# Patient Record
Sex: MALE | Race: WHITE | NOT HISPANIC OR LATINO | Employment: OTHER | URBAN - METROPOLITAN AREA
[De-identification: names, ages, dates, MRNs, and addresses within clinical notes are randomized per-mention and may not be internally consistent; named-entity substitution may affect disease eponyms.]

---

## 2017-03-07 RX ORDER — PANTOPRAZOLE SODIUM 40 MG/1
TABLET, DELAYED RELEASE ORAL
Qty: 90 TABLET | Refills: 0 | Status: SHIPPED | OUTPATIENT
Start: 2017-03-07 | End: 2017-06-08 | Stop reason: SDUPTHER

## 2017-04-24 RX ORDER — AMLODIPINE BESYLATE 5 MG/1
TABLET ORAL
Qty: 90 TABLET | Refills: 0 | Status: SHIPPED | OUTPATIENT
Start: 2017-04-24 | End: 2017-04-28 | Stop reason: SDUPTHER

## 2017-04-28 ENCOUNTER — OFFICE VISIT (OUTPATIENT)
Dept: INTERNAL MEDICINE | Facility: CLINIC | Age: 67
End: 2017-04-28

## 2017-04-28 VITALS
WEIGHT: 166.6 LBS | DIASTOLIC BLOOD PRESSURE: 78 MMHG | BODY MASS INDEX: 26.78 KG/M2 | SYSTOLIC BLOOD PRESSURE: 128 MMHG | HEIGHT: 66 IN | HEART RATE: 60 BPM

## 2017-04-28 DIAGNOSIS — I10 BENIGN ESSENTIAL HYPERTENSION: ICD-10-CM

## 2017-04-28 DIAGNOSIS — E22.2 SYNDROME OF INAPPROPRIATE SECRETION OF ANTIDIURETIC HORMONE (HCC): ICD-10-CM

## 2017-04-28 DIAGNOSIS — E03.9 ACQUIRED HYPOTHYROIDISM: ICD-10-CM

## 2017-04-28 DIAGNOSIS — I87.2 CHRONIC VENOUS INSUFFICIENCY: Primary | ICD-10-CM

## 2017-04-28 PROCEDURE — 99214 OFFICE O/P EST MOD 30 MIN: CPT | Performed by: INTERNAL MEDICINE

## 2017-04-28 NOTE — PROGRESS NOTES
Subjective   Yeyo Abebe is a 66 y.o. male.     History of Present Illness he is here today for his annual visit which includes follow-up of hypertension, and venous insufficiency, and syndrome of inappropriate ADH.  Generally he continues to feel well.  He does note some swelling in his ankles on an intermittent basis which is most prominent at the end of the day and in apparent in the morning when he first gets up.  There is no associated pain.  He denies any PND or dyspnea on exertion.  He has not had any chest pain.  He denies any dizziness or focal neurologic symptoms.  His energy level has been good.  His review systems other than chronic and stable 1-2 per night nocturia is negative.  Eyes any nausea or vomiting or abdominal pain.        Review of Systems   Constitutional: Negative for activity change, appetite change and fatigue.   HENT: Negative for trouble swallowing.    Respiratory: Negative for cough, chest tightness, shortness of breath and wheezing.    Cardiovascular: Positive for leg swelling. Negative for chest pain and palpitations.   Gastrointestinal: Negative for abdominal pain, anal bleeding, blood in stool, constipation, diarrhea, nausea and vomiting.   Genitourinary: Negative for difficulty urinating, dysuria, flank pain, frequency and hematuria.   Musculoskeletal: Negative for arthralgias and back pain.   Neurological: Negative for dizziness, syncope, facial asymmetry, speech difficulty, weakness, numbness and headaches.   Psychiatric/Behavioral: Negative for confusion and dysphoric mood. The patient is not nervous/anxious.        Objective   Physical Exam   Constitutional: He is oriented to person, place, and time. Vital signs are normal. He appears well-developed and well-nourished. He is active. He does not appear ill.   Eyes: Conjunctivae are normal.   Fundoscopic exam:       The right eye shows no AV nicking, no exudate and no hemorrhage.        The left eye shows no AV nicking, no  exudate and no hemorrhage.   Neck: Carotid bruit is not present. No thyroid mass and no thyromegaly present.   Cardiovascular: Normal rate, regular rhythm, S1 normal and S2 normal.  Exam reveals no S3 and no S4.    No murmur heard.  Pulses:       Dorsalis pedis pulses are 2+ on the right side, and 2+ on the left side.   Pulmonary/Chest: No tachypnea. No respiratory distress. He has no decreased breath sounds. He has no wheezes. He has no rhonchi. He has no rales.   Abdominal: Soft. Normal appearance and bowel sounds are normal. He exhibits no abdominal bruit and no mass. There is no hepatosplenomegaly. There is no tenderness.       Vascular Status -  His exam exhibits no right foot edema. His exam exhibits left foot edema (1+ malleolar edema).  Neurological: He is alert and oriented to person, place, and time. Gait normal.   Reflex Scores:       Bicep reflexes are 2+ on the right side.  Psychiatric: He has a normal mood and affect. His speech is normal and behavior is normal. Judgment and thought content normal. Cognition and memory are normal.       Assessment/Plan assessment #1 hypertension-good control and no sign of target organ injury #2 chronic venous insufficiency-mild and certainly Norvasc may be contributing to his edema.  This was discussed with him.  #3 syndrome of inappropriate ADH-diagnosis made 3 years ago and in no way has it adversely affected him.  He does not restrict free water intake.    Plan #1 no change medication #2 CBC, CMP, urinalysis, TSH.  Routine follow-up with me in 6 months.

## 2017-05-12 ENCOUNTER — LAB (OUTPATIENT)
Dept: INTERNAL MEDICINE | Facility: CLINIC | Age: 67
End: 2017-05-12

## 2017-05-12 DIAGNOSIS — I10 BENIGN ESSENTIAL HYPERTENSION: ICD-10-CM

## 2017-05-12 DIAGNOSIS — E03.9 ACQUIRED HYPOTHYROIDISM: ICD-10-CM

## 2017-05-12 LAB
ALBUMIN SERPL-MCNC: 3.97 G/DL (ref 3.4–4.6)
ALBUMIN/GLOB SERPL: 1 G/DL
ALP SERPL-CCNC: 65 U/L (ref 46–116)
ALT SERPL W P-5'-P-CCNC: 26 U/L (ref 16–63)
ANION GAP SERPL CALCULATED.3IONS-SCNC: 6 MMOL/L
AST SERPL-CCNC: 20 U/L (ref 7–37)
BACTERIA UR QL AUTO: ABNORMAL /HPF
BASOPHILS # BLD AUTO: 0.01 10*3/MM3 (ref 0–0.2)
BASOPHILS NFR BLD AUTO: 0.1 % (ref 0–1.5)
BILIRUB SERPL-MCNC: 0.5 MG/DL (ref 0.2–1)
BILIRUB UR QL STRIP: NEGATIVE
BUN BLD-MCNC: 6 MG/DL (ref 6–22)
BUN/CREAT SERPL: 10.7 (ref 7–25)
CALCIUM SPEC-SCNC: 8.8 MG/DL (ref 8.6–10.5)
CHLORIDE SERPL-SCNC: 90 MMOL/L (ref 95–107)
CLARITY UR: CLEAR
CO2 SERPL-SCNC: 31 MMOL/L (ref 23–32)
COLOR UR: YELLOW
CREAT BLD-MCNC: 0.56 MG/DL (ref 0.7–1.3)
EOSINOPHIL # BLD AUTO: 0.23 10*3/MM3 (ref 0–0.7)
EOSINOPHIL # BLD AUTO: 3 % (ref 0.3–6.2)
ERYTHROCYTE [DISTWIDTH] IN BLOOD BY AUTOMATED COUNT: 12.7 % (ref 11.5–14.5)
GFR SERPL CREATININE-BSD FRML MDRD: 146 ML/MIN/1.73
GLOBULIN UR ELPH-MCNC: 3.8 GM/DL
GLUCOSE BLD-MCNC: 84 MG/DL (ref 70–100)
GLUCOSE UR STRIP-MCNC: NEGATIVE MG/DL
HCT VFR BLD AUTO: 44.3 % (ref 40.4–52.2)
HGB BLD-MCNC: 14.8 G/DL (ref 13.7–17.6)
HGB UR QL STRIP.AUTO: ABNORMAL
HYALINE CASTS UR QL AUTO: ABNORMAL /LPF
IMM GRANULOCYTES # BLD: 0.04 10*3/MM3 (ref 0–0.03)
IMM GRANULOCYTES NFR BLD: 0.5 % (ref 0–0.5)
KETONES UR QL STRIP: ABNORMAL
LEUKOCYTE ESTERASE UR QL STRIP.AUTO: NEGATIVE
LYMPHOCYTES # BLD AUTO: 1.63 10*3/MM3 (ref 0.9–4.8)
LYMPHOCYTES NFR BLD AUTO: 21.4 % (ref 19.6–45.3)
MCH RBC QN AUTO: 31.8 PG (ref 27–32.7)
MCHC RBC AUTO-ENTMCNC: 33.4 G/DL (ref 32.6–36.4)
MCV RBC AUTO: 95.1 FL (ref 79.8–96.2)
MONOCYTES # BLD AUTO: 0.83 10*3/MM3 (ref 0.2–1.2)
MONOCYTES NFR BLD AUTO: 10.9 % (ref 5–12)
MUCOUS THREADS URNS QL MICRO: ABNORMAL /HPF
NEUTROPHILS # BLD AUTO: 4.87 10*3/MM3 (ref 1.9–8.1)
NEUTROPHILS NFR BLD AUTO: 64.1 % (ref 42.7–76)
NITRITE UR QL STRIP: NEGATIVE
PH UR STRIP.AUTO: 6.5 [PH] (ref 5–8)
PLATELET # BLD AUTO: 272 10*3/MM3 (ref 140–500)
POTASSIUM BLD-SCNC: 4 MMOL/L (ref 3.3–5.3)
PROT SERPL-MCNC: 7.8 G/DL (ref 6.3–8.4)
PROT UR QL STRIP: NEGATIVE
RBC # BLD AUTO: 4.66 10*6/MM3 (ref 4.6–6)
RBC # UR: ABNORMAL /HPF
REF LAB TEST METHOD: ABNORMAL
SODIUM BLD-SCNC: 127 MMOL/L (ref 136–145)
SP GR UR STRIP: 1.02 (ref 1–1.03)
SQUAMOUS #/AREA URNS HPF: ABNORMAL /HPF
TSH SERPL DL<=0.05 MIU/L-ACNC: 2.7 MIU/ML (ref 0.4–4.2)
UROBILINOGEN UR QL STRIP: ABNORMAL
WBC # BLD AUTO: 7.61 10*3/MM3 (ref 4.5–10.7)
WBC UR QL AUTO: ABNORMAL /HPF

## 2017-05-12 PROCEDURE — 84443 ASSAY THYROID STIM HORMONE: CPT | Performed by: INTERNAL MEDICINE

## 2017-05-12 PROCEDURE — 81001 URINALYSIS AUTO W/SCOPE: CPT | Performed by: INTERNAL MEDICINE

## 2017-05-12 PROCEDURE — 80053 COMPREHEN METABOLIC PANEL: CPT | Performed by: INTERNAL MEDICINE

## 2017-05-16 ENCOUNTER — TELEPHONE (OUTPATIENT)
Dept: INTERNAL MEDICINE | Facility: CLINIC | Age: 67
End: 2017-05-16

## 2017-06-08 RX ORDER — PANTOPRAZOLE SODIUM 40 MG/1
TABLET, DELAYED RELEASE ORAL
Qty: 90 TABLET | Refills: 0 | Status: SHIPPED | OUTPATIENT
Start: 2017-06-08 | End: 2017-09-05 | Stop reason: SDUPTHER

## 2017-07-25 RX ORDER — AMLODIPINE BESYLATE 5 MG/1
TABLET ORAL
Qty: 90 TABLET | Refills: 0 | Status: SHIPPED | OUTPATIENT
Start: 2017-07-25 | End: 2017-10-27

## 2017-09-07 RX ORDER — PANTOPRAZOLE SODIUM 40 MG/1
TABLET, DELAYED RELEASE ORAL
Qty: 90 TABLET | Refills: 0 | Status: SHIPPED | OUTPATIENT
Start: 2017-09-07 | End: 2017-12-04 | Stop reason: SDUPTHER

## 2017-10-23 RX ORDER — AMLODIPINE BESYLATE 5 MG/1
TABLET ORAL
Qty: 90 TABLET | Refills: 3 | Status: SHIPPED | OUTPATIENT
Start: 2017-10-23 | End: 2017-10-27

## 2017-10-27 ENCOUNTER — OFFICE VISIT (OUTPATIENT)
Dept: INTERNAL MEDICINE | Facility: CLINIC | Age: 67
End: 2017-10-27

## 2017-10-27 VITALS
SYSTOLIC BLOOD PRESSURE: 120 MMHG | HEIGHT: 66 IN | WEIGHT: 166 LBS | DIASTOLIC BLOOD PRESSURE: 80 MMHG | BODY MASS INDEX: 26.68 KG/M2 | HEART RATE: 66 BPM

## 2017-10-27 DIAGNOSIS — E22.2 SYNDROME OF INAPPROPRIATE SECRETION OF ANTIDIURETIC HORMONE (HCC): Primary | ICD-10-CM

## 2017-10-27 DIAGNOSIS — I10 BENIGN ESSENTIAL HYPERTENSION: ICD-10-CM

## 2017-10-27 PROCEDURE — 99214 OFFICE O/P EST MOD 30 MIN: CPT | Performed by: INTERNAL MEDICINE

## 2017-10-27 NOTE — PROGRESS NOTES
Subjective   Yeyo Abebe is a 67 y.o. male.     History of Present Illness he is here today for his annual visit which includes follow-up of hypertension, syndrome of inappropriate ADH, and GERD.  Overall he continues to do well.  His appetite is good and his weight is stable.  He denies any dizziness, syncope, headache, or focal neurologic episodes.  He denies any PND, dyspnea on exertion, chest pain, swelling in the ankles, abdominal pain, change in bowel habit, nausea and vomiting, melanotic stool, or rectal bleeding.  His only real complaint is that of some right upper lid ptosis that he has noticed over the last 3 or 4 months.  It may last several hours.  There is no associated blurred vision or diplopia and he denies any discharge from the eye or eye pain.        Review of Systems   Constitutional: Negative for activity change, appetite change and fatigue.   HENT: Negative for trouble swallowing.    Respiratory: Negative for cough, chest tightness, shortness of breath and wheezing.    Cardiovascular: Negative for chest pain, palpitations and leg swelling.   Gastrointestinal: Negative for abdominal pain, anal bleeding, blood in stool, constipation, diarrhea, nausea and vomiting.   Genitourinary: Positive for frequency. Negative for difficulty urinating, dysuria, flank pain and hematuria.   Musculoskeletal: Negative for arthralgias and back pain.   Neurological: Negative for dizziness, syncope, facial asymmetry, speech difficulty, weakness, numbness and headaches.   Psychiatric/Behavioral: Negative for confusion and dysphoric mood. The patient is not nervous/anxious.        Objective   Physical Exam   Constitutional: He is oriented to person, place, and time. Vital signs are normal. He appears well-developed and well-nourished. He is active. He does not appear ill.   Eyes: Conjunctivae, EOM and lids are normal.   Fundoscopic exam:       The right eye shows no AV nicking, no exudate and no hemorrhage.        The  left eye shows no AV nicking, no exudate and no hemorrhage.   Neck: Carotid bruit is not present. No thyroid mass and no thyromegaly present.   Cardiovascular: Normal rate, regular rhythm, S1 normal and S2 normal.  Exam reveals no S3 and no S4.    No murmur heard.  Pulses:       Posterior tibial pulses are 2+ on the right side, and 2+ on the left side.   Pulmonary/Chest: No tachypnea. No respiratory distress. He has no decreased breath sounds. He has no wheezes. He has no rhonchi. He has no rales.   Abdominal: Soft. Normal appearance and bowel sounds are normal. He exhibits no abdominal bruit and no mass. There is no hepatosplenomegaly. There is no tenderness.       Vascular Status -  His exam exhibits no right foot edema. His exam exhibits no left foot edema.  Neurological: He is alert and oriented to person, place, and time. He has normal strength. No cranial nerve deficit. He displays a negative Romberg sign. Gait normal.   Reflex Scores:       Bicep reflexes are 2+ on the right side and 2+ on the left side.  Psychiatric: He has a normal mood and affect. His speech is normal and behavior is normal. Judgment and thought content normal. Cognition and memory are normal.       Assessment/Plan May lab showed a normal CBC, TSH, urinalysis.  CMP shows sodium 127 and chloride 90    Assessment #1 hypertension-good control #2 GERD-good control #3 syndrome of inappropriate ADH-he tolerates relatively low-sodium well and he has done so for 3 years now.  Certainly no evidence of underlying pulmonary or CNS process.    Plan #1 no change medication #2 BMP today #3 ophthalmology consult for episodic ptosis.  He has already received flu vaccination for this season.  Routine follow-up with me in 6 months.

## 2017-10-28 ENCOUNTER — LAB (OUTPATIENT)
Dept: LAB | Facility: HOSPITAL | Age: 67
End: 2017-10-28
Attending: INTERNAL MEDICINE

## 2017-10-28 DIAGNOSIS — E22.2 SYNDROME OF INAPPROPRIATE SECRETION OF ANTIDIURETIC HORMONE (HCC): ICD-10-CM

## 2017-10-28 LAB
ANION GAP SERPL CALCULATED.3IONS-SCNC: 11.2 MMOL/L
BUN BLD-MCNC: 6 MG/DL (ref 8–23)
BUN/CREAT SERPL: 7.4 (ref 7–25)
CALCIUM SPEC-SCNC: 9 MG/DL (ref 8.6–10.5)
CHLORIDE SERPL-SCNC: 90 MMOL/L (ref 98–107)
CO2 SERPL-SCNC: 27.8 MMOL/L (ref 22–29)
CREAT BLD-MCNC: 0.81 MG/DL (ref 0.76–1.27)
GFR SERPL CREATININE-BSD FRML MDRD: 95 ML/MIN/1.73
GLUCOSE BLD-MCNC: 105 MG/DL (ref 65–99)
POTASSIUM BLD-SCNC: 4.2 MMOL/L (ref 3.5–5.2)
SODIUM BLD-SCNC: 129 MMOL/L (ref 136–145)

## 2017-10-28 PROCEDURE — 80048 BASIC METABOLIC PNL TOTAL CA: CPT

## 2017-10-28 PROCEDURE — 36415 COLL VENOUS BLD VENIPUNCTURE: CPT

## 2017-10-30 ENCOUNTER — TELEPHONE (OUTPATIENT)
Dept: INTERNAL MEDICINE | Facility: CLINIC | Age: 67
End: 2017-10-30

## 2017-10-30 RX ORDER — METOPROLOL TARTRATE 50 MG/1
TABLET, FILM COATED ORAL
Qty: 270 TABLET | Refills: 3 | Status: SHIPPED | OUTPATIENT
Start: 2017-10-30 | End: 2018-10-08 | Stop reason: SDUPTHER

## 2017-12-04 RX ORDER — PANTOPRAZOLE SODIUM 40 MG/1
TABLET, DELAYED RELEASE ORAL
Qty: 90 TABLET | Refills: 0 | Status: SHIPPED | OUTPATIENT
Start: 2017-12-04 | End: 2018-03-04 | Stop reason: SDUPTHER

## 2018-03-06 RX ORDER — PANTOPRAZOLE SODIUM 40 MG/1
TABLET, DELAYED RELEASE ORAL
Qty: 90 TABLET | Refills: 0 | Status: SHIPPED | OUTPATIENT
Start: 2018-03-06 | End: 2018-05-30 | Stop reason: SDUPTHER

## 2018-05-11 ENCOUNTER — OFFICE VISIT (OUTPATIENT)
Dept: INTERNAL MEDICINE | Facility: CLINIC | Age: 68
End: 2018-05-11

## 2018-05-11 VITALS
BODY MASS INDEX: 26.36 KG/M2 | HEIGHT: 66 IN | DIASTOLIC BLOOD PRESSURE: 68 MMHG | WEIGHT: 164 LBS | OXYGEN SATURATION: 97 % | SYSTOLIC BLOOD PRESSURE: 122 MMHG | HEART RATE: 62 BPM

## 2018-05-11 DIAGNOSIS — N40.1 BENIGN PROSTATIC HYPERPLASIA WITH NOCTURIA: ICD-10-CM

## 2018-05-11 DIAGNOSIS — E87.1 CHRONIC HYPONATREMIA: ICD-10-CM

## 2018-05-11 DIAGNOSIS — E22.2 SYNDROME OF INAPPROPRIATE SECRETION OF ANTIDIURETIC HORMONE (HCC): ICD-10-CM

## 2018-05-11 DIAGNOSIS — R35.1 BENIGN PROSTATIC HYPERPLASIA WITH NOCTURIA: ICD-10-CM

## 2018-05-11 DIAGNOSIS — I10 BENIGN ESSENTIAL HYPERTENSION: Primary | ICD-10-CM

## 2018-05-11 LAB
BASOPHILS # BLD AUTO: 0.01 10*3/MM3 (ref 0–0.2)
BASOPHILS NFR BLD AUTO: 0.1 % (ref 0–1.5)
BILIRUB UR QL STRIP: NEGATIVE
CLARITY UR: CLEAR
COLOR UR: YELLOW
EOSINOPHIL # BLD AUTO: 0.22 10*3/MM3 (ref 0–0.7)
EOSINOPHIL # BLD AUTO: 3.2 % (ref 0.3–6.2)
ERYTHROCYTE [DISTWIDTH] IN BLOOD BY AUTOMATED COUNT: 12.4 % (ref 11.5–14.5)
GLUCOSE UR STRIP-MCNC: NEGATIVE MG/DL
HCT VFR BLD AUTO: 45.5 % (ref 40.4–52.2)
HGB BLD-MCNC: 15 G/DL (ref 13.7–17.6)
HGB UR QL STRIP.AUTO: NEGATIVE
IMM GRANULOCYTES # BLD: 0 10*3/MM3 (ref 0–0.03)
IMM GRANULOCYTES NFR BLD: 0 % (ref 0–0.5)
KETONES UR QL STRIP: NEGATIVE
LEUKOCYTE ESTERASE UR QL STRIP.AUTO: NEGATIVE
LYMPHOCYTES # BLD AUTO: 1.37 10*3/MM3 (ref 0.9–4.8)
LYMPHOCYTES NFR BLD AUTO: 20.1 % (ref 19.6–45.3)
MCH RBC QN AUTO: 32.1 PG (ref 27–32.7)
MCHC RBC AUTO-ENTMCNC: 33 G/DL (ref 32.6–36.4)
MCV RBC AUTO: 97.2 FL (ref 79.8–96.2)
MONOCYTES # BLD AUTO: 0.9 10*3/MM3 (ref 0.2–1.2)
MONOCYTES NFR BLD AUTO: 13.2 % (ref 5–12)
NEUTROPHILS # BLD AUTO: 4.32 10*3/MM3 (ref 1.9–8.1)
NEUTROPHILS NFR BLD AUTO: 63.4 % (ref 42.7–76)
NITRITE UR QL STRIP: NEGATIVE
PH UR STRIP.AUTO: 6.5 [PH] (ref 5–8)
PLATELET # BLD AUTO: 256 10*3/MM3 (ref 140–500)
PROT UR QL STRIP: NEGATIVE
RBC # BLD AUTO: 4.68 10*6/MM3 (ref 4.6–6)
SP GR UR STRIP: 1.02 (ref 1–1.03)
UROBILINOGEN UR QL STRIP: NORMAL
WBC # BLD AUTO: 6.82 10*3/MM3 (ref 4.5–10.7)

## 2018-05-11 PROCEDURE — 81003 URINALYSIS AUTO W/O SCOPE: CPT | Performed by: INTERNAL MEDICINE

## 2018-05-11 PROCEDURE — 99213 OFFICE O/P EST LOW 20 MIN: CPT | Performed by: INTERNAL MEDICINE

## 2018-05-11 NOTE — PROGRESS NOTES
Subjective   Yeyo Abebe is a 67 y.o. male.     History of Present Illness he is here today for follow-up of hypertension and syndrome of inappropriate ADH.  He has felt well.  He does not restrict his fluid intake and he has not done so since the diagnosis was made approximately 3 years ago.  He tolerates hyponatremia quite well.  He still works a full schedule.  He denies any dizziness, headache, syncope, or focal neurologic symptoms.  He denies any HAAS, PND, chest pain, or swelling in the ankles.  His energy level is good.  Over the last month he has noticed decrease in urinary stream caliber.  However he does not have any dysuria, urinary hesitancy, urgency, sense of incomplete voiding  .  He has chronic and stable 1-4 per night nocturia.      Review of Systems   Constitutional: Negative for activity change, appetite change, fatigue, unexpected weight gain and unexpected weight loss.   HENT: Negative for trouble swallowing.    Respiratory: Negative for cough, chest tightness, shortness of breath and wheezing.    Cardiovascular: Negative for chest pain, palpitations and leg swelling.   Gastrointestinal: Negative for abdominal pain, diarrhea, nausea and vomiting.   Genitourinary: Positive for difficulty urinating and nocturia. Negative for dysuria, frequency and hematuria.   Neurological: Negative for dizziness, facial asymmetry, speech difficulty, weakness, numbness and headache.   Psychiatric/Behavioral: Negative for dysphoric mood. The patient is not nervous/anxious.        Objective   Physical Exam   Constitutional: He is oriented to person, place, and time. Vital signs are normal. He appears well-developed and well-nourished. He is active. He does not appear ill.   Eyes: Conjunctivae are normal.   Neck: Carotid bruit is not present.   Cardiovascular: Normal rate, regular rhythm, S1 normal and S2 normal.  Exam reveals no S3 and no S4.    No murmur heard.  Pulses:       Posterior tibial pulses are 2+ on the  right side, and 2+ on the left side.   Pulmonary/Chest: No tachypnea. No respiratory distress. He has no decreased breath sounds. He has no rhonchi. He has no rales.   Abdominal: Soft. Normal appearance. He exhibits no abdominal bruit and no mass. There is no hepatosplenomegaly. There is no tenderness.     Vascular Status -  His right foot exhibits no edema. His left foot exhibits no edema.  Neurological: He is alert and oriented to person, place, and time. Gait normal.   Psychiatric: He has a normal mood and affect. His speech is normal and behavior is normal. Judgment and thought content normal. Cognition and memory are normal.         Assessment/Plan assessment #1 hypertension-good control #2 syndrome of inappropriate ADH with chronic hyponatremia-he tolerates sodium usually in the high 120s.  #3 likely early BPH    Plan #1 no change medication #2 CBC, CMP, urinalysis today.  Routine follow-up in 6 months.

## 2018-05-12 LAB
ALBUMIN SERPL-MCNC: 4.4 G/DL (ref 3.5–5.2)
ALBUMIN/GLOB SERPL: 1.6 G/DL
ALP SERPL-CCNC: 62 U/L (ref 39–117)
ALT SERPL-CCNC: 14 U/L (ref 1–41)
AST SERPL-CCNC: 18 U/L (ref 1–40)
BILIRUB SERPL-MCNC: 0.6 MG/DL (ref 0.1–1.2)
BUN SERPL-MCNC: 8 MG/DL (ref 8–23)
BUN/CREAT SERPL: 10.5 (ref 7–25)
CALCIUM SERPL-MCNC: 9.5 MG/DL (ref 8.6–10.5)
CHLORIDE SERPL-SCNC: 91 MMOL/L (ref 98–107)
CO2 SERPL-SCNC: 29.1 MMOL/L (ref 22–29)
CREAT SERPL-MCNC: 0.76 MG/DL (ref 0.76–1.27)
GLOBULIN SER CALC-MCNC: 2.8 GM/DL
GLUCOSE SERPL-MCNC: 83 MG/DL (ref 65–99)
POTASSIUM SERPL-SCNC: 5.7 MMOL/L (ref 3.5–5.2)
PROT SERPL-MCNC: 7.2 G/DL (ref 6–8.5)
SODIUM SERPL-SCNC: 132 MMOL/L (ref 136–145)

## 2018-05-30 RX ORDER — PANTOPRAZOLE SODIUM 40 MG/1
TABLET, DELAYED RELEASE ORAL
Qty: 90 TABLET | Refills: 0 | Status: SHIPPED | OUTPATIENT
Start: 2018-05-30 | End: 2018-08-24 | Stop reason: SDUPTHER

## 2018-08-24 RX ORDER — PANTOPRAZOLE SODIUM 40 MG/1
TABLET, DELAYED RELEASE ORAL
Qty: 90 TABLET | Refills: 0 | Status: SHIPPED | OUTPATIENT
Start: 2018-08-24 | End: 2018-10-19 | Stop reason: SDUPTHER

## 2018-09-26 ENCOUNTER — TELEPHONE (OUTPATIENT)
Dept: FAMILY MEDICINE CLINIC | Facility: CLINIC | Age: 68
End: 2018-09-26

## 2018-09-26 NOTE — TELEPHONE ENCOUNTER
Scheduled for 10-19-18     ----- Message from Milton Dobbins MD sent at 9/26/2018 10:56 AM EDT -----  Okay    ----- Message -----  From: Jeanette Boston  Sent: 9/26/2018   9:35 AM  To: Milton Dobbins MD    Patient number [phone]     Will you take him on as a patient? Former dr talbot

## 2018-10-08 ENCOUNTER — TELEPHONE (OUTPATIENT)
Dept: INTERNAL MEDICINE | Facility: CLINIC | Age: 68
End: 2018-10-08

## 2018-10-08 DIAGNOSIS — I10 BENIGN ESSENTIAL HTN: Primary | ICD-10-CM

## 2018-10-08 RX ORDER — METOPROLOL TARTRATE 50 MG/1
TABLET, FILM COATED ORAL
Qty: 45 TABLET | Refills: 0 | Status: SHIPPED | OUTPATIENT
Start: 2018-10-08 | End: 2018-10-19 | Stop reason: SDUPTHER

## 2018-10-08 NOTE — TELEPHONE ENCOUNTER
----- Message from Mavis Irizarry sent at 10/8/2018  9:40 AM EDT -----  Contact: Patient- SANTOSH  Patient requesting refill on    metoprolol tartrate (LOPRESSOR) 50 MG tablet    He does have an appointment with a new  next Friday    Patient:[phone]    pharmacy:[facility] [address] [place] [address] [facility] [facility] [address] [facility] [address] &  - [phone] [facility] [phone] FX

## 2018-10-19 ENCOUNTER — OFFICE VISIT (OUTPATIENT)
Dept: FAMILY MEDICINE CLINIC | Facility: CLINIC | Age: 68
End: 2018-10-19

## 2018-10-19 VITALS
TEMPERATURE: 98.1 F | DIASTOLIC BLOOD PRESSURE: 84 MMHG | HEIGHT: 66 IN | SYSTOLIC BLOOD PRESSURE: 140 MMHG | HEART RATE: 62 BPM | WEIGHT: 160 LBS | BODY MASS INDEX: 25.71 KG/M2 | RESPIRATION RATE: 15 BRPM

## 2018-10-19 DIAGNOSIS — Z79.899 HIGH RISK MEDICATION USE: ICD-10-CM

## 2018-10-19 DIAGNOSIS — I49.3 VENTRICULAR PREMATURE BEATS: ICD-10-CM

## 2018-10-19 DIAGNOSIS — E22.2 SYNDROME OF INAPPROPRIATE SECRETION OF ANTIDIURETIC HORMONE (HCC): ICD-10-CM

## 2018-10-19 DIAGNOSIS — Z00.00 HEALTH CARE MAINTENANCE: ICD-10-CM

## 2018-10-19 DIAGNOSIS — K21.9 GASTROESOPHAGEAL REFLUX DISEASE WITHOUT ESOPHAGITIS: ICD-10-CM

## 2018-10-19 DIAGNOSIS — R35.1 BENIGN PROSTATIC HYPERPLASIA WITH NOCTURIA: ICD-10-CM

## 2018-10-19 DIAGNOSIS — N40.0 PROSTATISM: ICD-10-CM

## 2018-10-19 DIAGNOSIS — M41.30 THORACOGENIC SCOLIOSIS, UNSPECIFIED SPINAL REGION: ICD-10-CM

## 2018-10-19 DIAGNOSIS — J30.9 ALLERGIC RHINITIS, UNSPECIFIED SEASONALITY, UNSPECIFIED TRIGGER: ICD-10-CM

## 2018-10-19 DIAGNOSIS — N40.1 BENIGN PROSTATIC HYPERPLASIA WITH NOCTURIA: ICD-10-CM

## 2018-10-19 DIAGNOSIS — J38.3 VOCAL CORD DYSFUNCTION: Primary | ICD-10-CM

## 2018-10-19 DIAGNOSIS — I10 BENIGN ESSENTIAL HTN: ICD-10-CM

## 2018-10-19 PROCEDURE — 99214 OFFICE O/P EST MOD 30 MIN: CPT | Performed by: FAMILY MEDICINE

## 2018-10-19 RX ORDER — INFLUENZA VACCINE, ADJUVANTED 15; 15; 15 UG/.5ML; UG/.5ML; UG/.5ML
INJECTION, SUSPENSION INTRAMUSCULAR
Refills: 0 | COMMUNITY
Start: 2018-09-30 | End: 2019-09-06

## 2018-10-19 RX ORDER — PANTOPRAZOLE SODIUM 40 MG/1
40 TABLET, DELAYED RELEASE ORAL DAILY
Qty: 90 TABLET | Refills: 3 | Status: SHIPPED | OUTPATIENT
Start: 2018-10-19 | End: 2019-10-27 | Stop reason: SDUPTHER

## 2018-10-19 RX ORDER — AMLODIPINE BESYLATE 5 MG/1
5 TABLET ORAL DAILY
Qty: 90 TABLET | Refills: 3 | Status: SHIPPED | OUTPATIENT
Start: 2018-10-19 | End: 2019-08-05 | Stop reason: SDUPTHER

## 2018-10-19 RX ORDER — METOPROLOL TARTRATE 50 MG/1
TABLET, FILM COATED ORAL
Qty: 270 TABLET | Refills: 3 | Status: SHIPPED | OUTPATIENT
Start: 2018-10-19 | End: 2019-10-27 | Stop reason: SDUPTHER

## 2018-10-19 NOTE — PROGRESS NOTES
HPI  Yeyo Abebe is a 68 y.o. male who is here to establish a new primary care physician.  Previous physician retired.  Overall patient denies any new complaints.  Has significant allergy and reflux symptoms fairly well controlled with current regimen.  Does have known swallowing disorder with episodic choking.  Also has significant deformity of the spine and underwent surgery at age 7 and is doing amazingly well.  Old records otherwise reviewed.  As far as can tell had screening colonoscopy in 2009 and was told normal and repeat in 10 years.  Also history of low sodium which has been constant for many years and evaluated by several specialist.  At times was placed on high sodium diet with supplements etc.       Review of Systems   HENT: Positive for rhinorrhea, sneezing and tinnitus.    Eyes: Positive for discharge, redness and itching.   Respiratory: Positive for choking.    Musculoskeletal: Positive for back pain.   Allergic/Immunologic: Positive for environmental allergies.   All other systems reviewed and are negative.        Past Medical History:   Diagnosis Date   • GERD (gastroesophageal reflux disease)    • Hypertension    • Personal history of kidney stones        Past Surgical History:   Procedure Laterality Date   • ABDOMINAL HERNIA REPAIR N/A 2008   • BACK SURGERY N/A 1958    Dr. Hurt   • COLONOSCOPY N/A 2010    [facility]   • ENDOSCOPY N/A 5/25/2016    Procedure: ESOPHAGOGASTRODUODENOSCOPY WITH ANESTHESIA;  Surgeon: Shayan Taveras MD;  Location: [facility] ENDOSCOPY;  Service:        No family history on file.    Social History     Social History   • Marital status:      Spouse name: N/A   • Number of children: N/A   • Years of education: N/A     Occupational History   • Not on file.     Social History Main Topics   • Smoking status: Never Smoker   • Smokeless tobacco: Never Used   • Alcohol use 0.6 oz/week     1 Cans of beer per week      Comment: seldom, socially   • Drug use: No   •  Sexual activity: Defer     Other Topics Concern   • Not on file     Social History Narrative   • No narrative on file         Physical Exam   Constitutional: He is oriented to person, place, and time. He appears well-developed and well-nourished. No distress.   HENT:   Head: Normocephalic and atraumatic.   Nose: Nose normal.   Mouth/Throat: Oropharynx is clear and moist.   Eyes: Pupils are equal, round, and reactive to light. Conjunctivae and EOM are normal.   Neck: Normal range of motion.   Cardiovascular: Normal rate, regular rhythm and normal heart sounds.    Pulmonary/Chest: Effort normal. No respiratory distress. He has no wheezes. He has no rales.   Abdominal: Soft. He exhibits no distension. There is no tenderness.   Musculoskeletal: Normal range of motion. He exhibits deformity. He exhibits no edema or tenderness.        Thoracic back: He exhibits deformity.   Neurological: He is alert and oriented to person, place, and time. Coordination normal.   Skin: Skin is warm and dry.   Psychiatric: He has a normal mood and affect. His behavior is normal. Judgment and thought content normal.   Nursing note and vitals reviewed.        Assessment/Plan    Yeyo was seen today for annual exam.    Diagnoses and all orders for this visit:    Vocal cord dysfunction    Gastroesophageal reflux disease without esophagitis  -     pantoprazole (PROTONIX) 40 MG EC tablet; Take 1 tablet by mouth Daily.    Benign essential HTN  -     amLODIPine (NORVASC) 5 MG tablet; Take 1 tablet by mouth Daily.  -     metoprolol tartrate (LOPRESSOR) 50 MG tablet; Take 1 1/2 tablets two times a day. LAST REFILL UNTIL SEEN BY NEW PROVIDER    Benign essential hypertension    Ventricular premature beats    Syndrome of inappropriate secretion of antidiuretic hormone (CMS/HCC)    Benign prostatic hyperplasia with nocturia        Patient is here to establish a new primary care physician.  Reviewed old records.  Seems to be doing very well on current  regimen which will be continued including follow-up every 6 months.  Do recommend getting routine lab work next month as discussed.  Also discussed getting Pneumovax and possibly shingles vaccine through his pharmacy where insurance coverage can be verified.    This note includes information entered using a voice recognition dictation system.  Though reviewed, some nonsensible errors may remain.

## 2018-11-06 DIAGNOSIS — Z00.00 HEALTH CARE MAINTENANCE: ICD-10-CM

## 2018-11-06 DIAGNOSIS — E22.2 SYNDROME OF INAPPROPRIATE SECRETION OF ANTIDIURETIC HORMONE (HCC): ICD-10-CM

## 2018-11-06 DIAGNOSIS — Z79.899 HIGH RISK MEDICATION USE: ICD-10-CM

## 2018-11-06 DIAGNOSIS — N40.0 PROSTATISM: ICD-10-CM

## 2018-11-06 DIAGNOSIS — J30.9 ALLERGIC RHINITIS, UNSPECIFIED SEASONALITY, UNSPECIFIED TRIGGER: ICD-10-CM

## 2018-11-06 DIAGNOSIS — I10 BENIGN ESSENTIAL HTN: ICD-10-CM

## 2018-11-06 DIAGNOSIS — K21.9 GASTROESOPHAGEAL REFLUX DISEASE WITHOUT ESOPHAGITIS: ICD-10-CM

## 2018-11-07 LAB
ALBUMIN SERPL-MCNC: 4.2 G/DL (ref 3.5–5.2)
ALBUMIN/GLOB SERPL: 1.4 G/DL
ALP SERPL-CCNC: 57 U/L (ref 39–117)
ALT SERPL-CCNC: 15 U/L (ref 1–41)
AST SERPL-CCNC: 13 U/L (ref 1–40)
BASOPHILS # BLD AUTO: 0.01 10*3/MM3 (ref 0–0.2)
BASOPHILS NFR BLD AUTO: 0.2 % (ref 0–1.5)
BILIRUB SERPL-MCNC: 0.6 MG/DL (ref 0.1–1.2)
BUN SERPL-MCNC: 7 MG/DL (ref 8–23)
BUN/CREAT SERPL: 10.1 (ref 7–25)
CALCIUM SERPL-MCNC: 8.9 MG/DL (ref 8.6–10.5)
CHLORIDE SERPL-SCNC: 90 MMOL/L (ref 98–107)
CO2 SERPL-SCNC: 28.6 MMOL/L (ref 22–29)
CREAT SERPL-MCNC: 0.69 MG/DL (ref 0.76–1.27)
EOSINOPHIL # BLD AUTO: 0.13 10*3/MM3 (ref 0–0.7)
EOSINOPHIL NFR BLD AUTO: 2 % (ref 0.3–6.2)
ERYTHROCYTE [DISTWIDTH] IN BLOOD BY AUTOMATED COUNT: 12.7 % (ref 11.5–14.5)
GLOBULIN SER CALC-MCNC: 2.9 GM/DL
GLUCOSE SERPL-MCNC: 114 MG/DL (ref 65–99)
HCT VFR BLD AUTO: 42.6 % (ref 40.4–52.2)
HCV AB S/CO SERPL IA: <0.1 S/CO RATIO (ref 0–0.9)
HGB BLD-MCNC: 14.2 G/DL (ref 13.7–17.6)
IMM GRANULOCYTES # BLD: 0.02 10*3/MM3 (ref 0–0.03)
IMM GRANULOCYTES NFR BLD: 0.3 % (ref 0–0.5)
LYMPHOCYTES # BLD AUTO: 1.23 10*3/MM3 (ref 0.9–4.8)
LYMPHOCYTES NFR BLD AUTO: 19 % (ref 19.6–45.3)
MCH RBC QN AUTO: 31.8 PG (ref 27–32.7)
MCHC RBC AUTO-ENTMCNC: 33.3 G/DL (ref 32.6–36.4)
MCV RBC AUTO: 95.3 FL (ref 79.8–96.2)
MONOCYTES # BLD AUTO: 0.47 10*3/MM3 (ref 0.2–1.2)
MONOCYTES NFR BLD AUTO: 7.2 % (ref 5–12)
NEUTROPHILS # BLD AUTO: 4.65 10*3/MM3 (ref 1.9–8.1)
NEUTROPHILS NFR BLD AUTO: 71.6 % (ref 42.7–76)
PLATELET # BLD AUTO: 260 10*3/MM3 (ref 140–500)
POTASSIUM SERPL-SCNC: 4.3 MMOL/L (ref 3.5–5.2)
PROT SERPL-MCNC: 7.1 G/DL (ref 6–8.5)
PSA SERPL-MCNC: 6.31 NG/ML (ref 0–4)
RBC # BLD AUTO: 4.47 10*6/MM3 (ref 4.6–6)
SODIUM SERPL-SCNC: 130 MMOL/L (ref 136–145)
WBC # BLD AUTO: 6.49 10*3/MM3 (ref 4.5–10.7)

## 2018-11-13 ENCOUNTER — TELEPHONE (OUTPATIENT)
Dept: FAMILY MEDICINE CLINIC | Facility: CLINIC | Age: 68
End: 2018-11-13

## 2018-11-13 DIAGNOSIS — R97.20 ELEVATED PSA, LESS THAN 10 NG/ML: Primary | ICD-10-CM

## 2018-11-13 NOTE — TELEPHONE ENCOUNTER
Patient missed your call regarding labs. Says you wanted him to call you so you can review with him.    He can be reached at [phone].

## 2019-07-29 DIAGNOSIS — I10 BENIGN ESSENTIAL HTN: ICD-10-CM

## 2019-07-29 RX ORDER — AMLODIPINE BESYLATE 5 MG/1
5 TABLET ORAL DAILY
Qty: 90 TABLET | Refills: 0 | OUTPATIENT
Start: 2019-07-29

## 2019-08-05 DIAGNOSIS — I10 BENIGN ESSENTIAL HTN: ICD-10-CM

## 2019-08-06 RX ORDER — AMLODIPINE BESYLATE 5 MG/1
5 TABLET ORAL DAILY
Qty: 14 TABLET | Refills: 0 | Status: SHIPPED | OUTPATIENT
Start: 2019-08-06 | End: 2019-09-06 | Stop reason: SDUPTHER

## 2019-08-21 DIAGNOSIS — I10 BENIGN ESSENTIAL HTN: ICD-10-CM

## 2019-08-22 RX ORDER — AMLODIPINE BESYLATE 5 MG/1
5 TABLET ORAL DAILY
Qty: 30 TABLET | Refills: 0 | Status: SHIPPED | OUTPATIENT
Start: 2019-08-22 | End: 2019-09-18 | Stop reason: SDUPTHER

## 2019-09-06 ENCOUNTER — OFFICE VISIT (OUTPATIENT)
Dept: FAMILY MEDICINE CLINIC | Facility: CLINIC | Age: 69
End: 2019-09-06

## 2019-09-06 VITALS
RESPIRATION RATE: 16 BRPM | HEART RATE: 64 BPM | DIASTOLIC BLOOD PRESSURE: 80 MMHG | OXYGEN SATURATION: 98 % | HEIGHT: 66 IN | BODY MASS INDEX: 26 KG/M2 | TEMPERATURE: 98.2 F | SYSTOLIC BLOOD PRESSURE: 130 MMHG | WEIGHT: 161.8 LBS

## 2019-09-06 DIAGNOSIS — Z85.46 HISTORY OF PROSTATE CANCER: ICD-10-CM

## 2019-09-06 DIAGNOSIS — Z12.11 SCREENING FOR COLORECTAL CANCER: ICD-10-CM

## 2019-09-06 DIAGNOSIS — Z79.899 HIGH RISK MEDICATION USE: ICD-10-CM

## 2019-09-06 DIAGNOSIS — E78.00 HYPERCHOLESTEROLEMIA: ICD-10-CM

## 2019-09-06 DIAGNOSIS — C44.311 BASAL CELL CARCINOMA OF SKIN OF NOSE: Primary | ICD-10-CM

## 2019-09-06 DIAGNOSIS — J30.9 ALLERGIC RHINITIS, UNSPECIFIED SEASONALITY, UNSPECIFIED TRIGGER: ICD-10-CM

## 2019-09-06 DIAGNOSIS — J38.3 VOCAL CORD DYSFUNCTION: ICD-10-CM

## 2019-09-06 DIAGNOSIS — I10 BENIGN ESSENTIAL HTN: ICD-10-CM

## 2019-09-06 DIAGNOSIS — Z12.12 SCREENING FOR COLORECTAL CANCER: ICD-10-CM

## 2019-09-06 DIAGNOSIS — K21.9 GASTROESOPHAGEAL REFLUX DISEASE WITHOUT ESOPHAGITIS: ICD-10-CM

## 2019-09-06 DIAGNOSIS — I10 ESSENTIAL HYPERTENSION: ICD-10-CM

## 2019-09-06 DIAGNOSIS — M41.30 THORACOGENIC SCOLIOSIS, UNSPECIFIED SPINAL REGION: ICD-10-CM

## 2019-09-06 PROBLEM — Z91.09 ENVIRONMENTAL ALLERGIES: Status: ACTIVE | Noted: 2019-09-06

## 2019-09-06 PROBLEM — C80.1 CANCER (HCC): Status: ACTIVE | Noted: 2019-09-06

## 2019-09-06 PROBLEM — N20.0 CALCIUM OXALATE RENAL STONES: Status: ACTIVE | Noted: 2019-09-06

## 2019-09-06 PROBLEM — I51.89 DIASTOLIC DYSFUNCTION: Status: ACTIVE | Noted: 2019-09-06

## 2019-09-06 PROBLEM — M47.816 OSTEOARTHRITIS OF LUMBAR SPINE: Status: ACTIVE | Noted: 2019-09-06

## 2019-09-06 PROCEDURE — 99214 OFFICE O/P EST MOD 30 MIN: CPT | Performed by: FAMILY MEDICINE

## 2019-09-06 RX ORDER — TAMSULOSIN HYDROCHLORIDE 0.4 MG/1
0.4 CAPSULE ORAL DAILY
Refills: 1 | COMMUNITY
Start: 2019-06-28

## 2019-09-06 RX ORDER — IBUPROFEN 200 MG
200 TABLET ORAL EVERY 6 HOURS PRN
COMMUNITY
End: 2020-09-21

## 2019-09-06 NOTE — PROGRESS NOTES
HPI  Yeyo Abebe is a 69 y.o. male who is here for follow up of hypertension and general checkup.  Diagnosed recently with prostate cancer and apparently has undergone prostate radiation and also was given 1 hormone injection.  Most recent PSA level significantly decreased.  Continued monitor through urologist.  Has been on same blood pressure medicine for many years.  His past due for routine health maintenance.  Has had basal cell cancers removed from his face and has a persistent lesion on his nose that needs evaluating.      Review of Systems   All other systems reviewed and are negative.        Past Medical History:   Diagnosis Date   • GERD (gastroesophageal reflux disease)    • Hypertension    • Personal history of kidney stones        Past Surgical History:   Procedure Laterality Date   • ABDOMINAL HERNIA REPAIR N/A 2008   • BACK SURGERY N/A 1958    Dr. Hurt   • COLONOSCOPY N/A 2010    [facility]   • ENDOSCOPY N/A 5/25/2016    Procedure: ESOPHAGOGASTRODUODENOSCOPY WITH ANESTHESIA;  Surgeon: Shayan Taveras MD;  Location: [facility] END[facility]COPY;  Service:        History reviewed. No pertinent family history.    Social History     Socioeconomic History   • Marital status:      Spouse name: Not on file   • Number of children: Not on file   • Years of education: Not on file   • Highest education level: Not on file   Tobacco Use   • Smoking status: Never Smoker   • Smokeless tobacco: Never Used   Substance and Sexual Activity   • Alcohol use: Yes     Alcohol/week: 0.6 oz     Types: 1 Cans of beer per week     Comment: seldom, socially   • Drug use: No   • Sexual activity: Defer         Physical Exam   Constitutional: He is oriented to person, place, and time. He appears well-developed and well-nourished.   HENT:   Head: Normocephalic and atraumatic.       Nose: Nose normal.   Mouth/Throat: Oropharynx is clear and moist.   Eyes: Conjunctivae and EOM are normal. Pupils are equal, round, and reactive  to light.   Neck: Normal range of motion. Neck supple. No tracheal deviation present.   Cardiovascular: Normal rate, regular rhythm and normal heart sounds.   Pulmonary/Chest: Effort normal. No respiratory distress.   Abdominal: Soft. He exhibits no distension.   Musculoskeletal: Normal range of motion. He exhibits no edema or deformity.   Neurological: He is alert and oriented to person, place, and time. He exhibits normal muscle tone. Coordination normal.   Skin: Skin is warm and dry.   Psychiatric: He has a normal mood and affect. His behavior is normal. Judgment and thought content normal.   Nursing note and vitals reviewed.        Assessment/Plan    Yeyo was seen today for hypertension.    Diagnoses and all orders for this visit:    Basal cell carcinoma of skin of nose  -     Ambulatory Referral to Dermatology    Hypercholesterolemia    Essential hypertension    Gastroesophageal reflux disease without esophagitis    Benign essential HTN    Allergic rhinitis, unspecified seasonality, unspecified trigger    Vocal cord dysfunction    Thoracogenic scoliosis, unspecified spinal region    History of prostate cancer      Patient here for routine follow-up of above-noted medical problems.  Past due for routine lab and recommend returning 1 to 2 weeks for fasting lab work.  Has a persistent lesion on his nose which is suspicious for basal cell cancer and will refer to dermatologist.  Has had basal cells removed from other areas in the past.  Also was due for colonoscopy earlier this year and will be scheduled.  After some effort finally found was done at [facility].  Assuming was negative?  Overall doing well on current regimen which will be continued with follow-up in 6 months

## 2019-09-13 ENCOUNTER — RESULTS ENCOUNTER (OUTPATIENT)
Dept: FAMILY MEDICINE CLINIC | Facility: CLINIC | Age: 69
End: 2019-09-13

## 2019-09-13 DIAGNOSIS — E78.00 HYPERCHOLESTEROLEMIA: ICD-10-CM

## 2019-09-13 DIAGNOSIS — I10 ESSENTIAL HYPERTENSION: ICD-10-CM

## 2019-09-13 DIAGNOSIS — Z79.899 HIGH RISK MEDICATION USE: ICD-10-CM

## 2019-09-18 DIAGNOSIS — I10 BENIGN ESSENTIAL HTN: ICD-10-CM

## 2019-09-18 RX ORDER — AMLODIPINE BESYLATE 5 MG/1
TABLET ORAL
Qty: 90 TABLET | Refills: 3 | Status: SHIPPED | OUTPATIENT
Start: 2019-09-18 | End: 2020-09-03

## 2019-10-27 DIAGNOSIS — K21.9 GASTROESOPHAGEAL REFLUX DISEASE WITHOUT ESOPHAGITIS: ICD-10-CM

## 2019-10-27 DIAGNOSIS — I10 BENIGN ESSENTIAL HTN: ICD-10-CM

## 2019-10-28 RX ORDER — PANTOPRAZOLE SODIUM 40 MG/1
40 TABLET, DELAYED RELEASE ORAL DAILY
Qty: 90 TABLET | Refills: 0 | Status: SHIPPED | OUTPATIENT
Start: 2019-10-28 | End: 2020-01-06 | Stop reason: ALTCHOICE

## 2019-10-28 RX ORDER — METOPROLOL TARTRATE 50 MG/1
TABLET, FILM COATED ORAL
Qty: 270 TABLET | Refills: 0 | Status: SHIPPED | OUTPATIENT
Start: 2019-10-28 | End: 2020-01-16

## 2019-11-08 LAB
ALBUMIN SERPL-MCNC: 4.4 G/DL (ref 3.5–5.2)
ALBUMIN/GLOB SERPL: 2 G/DL
ALP SERPL-CCNC: 43 U/L (ref 39–117)
ALT SERPL-CCNC: 13 U/L (ref 1–41)
AST SERPL-CCNC: 17 U/L (ref 1–40)
BASOPHILS # BLD AUTO: 0.02 10*3/MM3 (ref 0–0.2)
BASOPHILS NFR BLD AUTO: 0.4 % (ref 0–1.5)
BILIRUB SERPL-MCNC: 0.4 MG/DL (ref 0.2–1.2)
BUN SERPL-MCNC: 10 MG/DL (ref 8–23)
BUN/CREAT SERPL: 13.5 (ref 7–25)
CALCIUM SERPL-MCNC: 9.1 MG/DL (ref 8.6–10.5)
CHLORIDE SERPL-SCNC: 91 MMOL/L (ref 98–107)
CHOLEST SERPL-MCNC: 165 MG/DL (ref 0–200)
CO2 SERPL-SCNC: 26.3 MMOL/L (ref 22–29)
CREAT SERPL-MCNC: 0.74 MG/DL (ref 0.76–1.27)
EOSINOPHIL # BLD AUTO: 0.18 10*3/MM3 (ref 0–0.4)
EOSINOPHIL NFR BLD AUTO: 3.5 % (ref 0.3–6.2)
ERYTHROCYTE [DISTWIDTH] IN BLOOD BY AUTOMATED COUNT: 11.7 % (ref 12.3–15.4)
GLOBULIN SER CALC-MCNC: 2.2 GM/DL
GLUCOSE SERPL-MCNC: 108 MG/DL (ref 65–99)
HCT VFR BLD AUTO: 36.8 % (ref 37.5–51)
HDLC SERPL-MCNC: 62 MG/DL (ref 40–60)
HGB BLD-MCNC: 12.2 G/DL (ref 13–17.7)
IMM GRANULOCYTES # BLD AUTO: 0.02 10*3/MM3 (ref 0–0.05)
IMM GRANULOCYTES NFR BLD AUTO: 0.4 % (ref 0–0.5)
LDLC SERPL CALC-MCNC: 87 MG/DL (ref 0–100)
LYMPHOCYTES # BLD AUTO: 0.51 10*3/MM3 (ref 0.7–3.1)
LYMPHOCYTES NFR BLD AUTO: 10 % (ref 19.6–45.3)
MCH RBC QN AUTO: 32.4 PG (ref 26.6–33)
MCHC RBC AUTO-ENTMCNC: 33.2 G/DL (ref 31.5–35.7)
MCV RBC AUTO: 97.9 FL (ref 79–97)
MONOCYTES # BLD AUTO: 0.53 10*3/MM3 (ref 0.1–0.9)
MONOCYTES NFR BLD AUTO: 10.4 % (ref 5–12)
NEUTROPHILS # BLD AUTO: 3.82 10*3/MM3 (ref 1.7–7)
NEUTROPHILS NFR BLD AUTO: 75.3 % (ref 42.7–76)
NRBC BLD AUTO-RTO: 0 /100 WBC (ref 0–0.2)
PLATELET # BLD AUTO: 216 10*3/MM3 (ref 140–450)
POTASSIUM SERPL-SCNC: 4.5 MMOL/L (ref 3.5–5.2)
PROT SERPL-MCNC: 6.6 G/DL (ref 6–8.5)
RBC # BLD AUTO: 3.76 10*6/MM3 (ref 4.14–5.8)
SODIUM SERPL-SCNC: 132 MMOL/L (ref 136–145)
TRIGL SERPL-MCNC: 78 MG/DL (ref 0–150)
VLDLC SERPL CALC-MCNC: 15.6 MG/DL
WBC # BLD AUTO: 5.08 10*3/MM3 (ref 3.4–10.8)

## 2019-11-12 DIAGNOSIS — D64.9 ANEMIA, UNSPECIFIED TYPE: Primary | ICD-10-CM

## 2019-11-13 ENCOUNTER — RESULTS ENCOUNTER (OUTPATIENT)
Dept: FAMILY MEDICINE CLINIC | Facility: CLINIC | Age: 69
End: 2019-11-13

## 2019-11-13 DIAGNOSIS — D64.9 ANEMIA, UNSPECIFIED TYPE: ICD-10-CM

## 2019-11-13 LAB
BASOPHILS # BLD AUTO: 0.02 10*3/MM3 (ref 0–0.2)
BASOPHILS NFR BLD AUTO: 0.3 % (ref 0–1.5)
EOSINOPHIL # BLD AUTO: 0.2 10*3/MM3 (ref 0–0.4)
EOSINOPHIL NFR BLD AUTO: 3 % (ref 0.3–6.2)
ERYTHROCYTE [DISTWIDTH] IN BLOOD BY AUTOMATED COUNT: 11.9 % (ref 12.3–15.4)
HCT VFR BLD AUTO: 37.9 % (ref 37.5–51)
HGB BLD-MCNC: 12.6 G/DL (ref 13–17.7)
IMM GRANULOCYTES # BLD AUTO: 0.02 10*3/MM3 (ref 0–0.05)
IMM GRANULOCYTES NFR BLD AUTO: 0.3 % (ref 0–0.5)
LYMPHOCYTES # BLD AUTO: 0.68 10*3/MM3 (ref 0.7–3.1)
LYMPHOCYTES NFR BLD AUTO: 10.3 % (ref 19.6–45.3)
MCH RBC QN AUTO: 32.3 PG (ref 26.6–33)
MCHC RBC AUTO-ENTMCNC: 33.2 G/DL (ref 31.5–35.7)
MCV RBC AUTO: 97.2 FL (ref 79–97)
MONOCYTES # BLD AUTO: 0.58 10*3/MM3 (ref 0.1–0.9)
MONOCYTES NFR BLD AUTO: 8.8 % (ref 5–12)
NEUTROPHILS # BLD AUTO: 5.08 10*3/MM3 (ref 1.7–7)
NEUTROPHILS NFR BLD AUTO: 77.3 % (ref 42.7–76)
NRBC BLD AUTO-RTO: 0 /100 WBC (ref 0–0.2)
PLATELET # BLD AUTO: 217 10*3/MM3 (ref 140–450)
RBC # BLD AUTO: 3.9 10*6/MM3 (ref 4.14–5.8)
RETICS/RBC NFR AUTO: 2.13 % (ref 0.7–1.9)
WBC # BLD AUTO: 6.58 10*3/MM3 (ref 3.4–10.8)

## 2019-11-14 ENCOUNTER — PREP FOR SURGERY (OUTPATIENT)
Dept: OTHER | Facility: HOSPITAL | Age: 69
End: 2019-11-14

## 2019-11-14 DIAGNOSIS — Z12.11 SCREENING FOR COLON CANCER: Primary | ICD-10-CM

## 2019-11-15 ENCOUNTER — PREP FOR SURGERY (OUTPATIENT)
Dept: OTHER | Facility: HOSPITAL | Age: 69
End: 2019-11-15

## 2019-11-22 PROBLEM — Z12.11 SCREENING FOR COLON CANCER: Status: ACTIVE | Noted: 2019-11-22

## 2019-12-11 ENCOUNTER — HOSPITAL ENCOUNTER (OUTPATIENT)
Facility: HOSPITAL | Age: 69
Setting detail: HOSPITAL OUTPATIENT SURGERY
Discharge: HOME | End: 2019-12-11
Attending: SURGERY | Admitting: SURGERY

## 2019-12-11 ENCOUNTER — ANESTHESIA (OUTPATIENT)
Dept: GASTROENTEROLOGY | Facility: HOSPITAL | Age: 69
End: 2019-12-11

## 2019-12-11 ENCOUNTER — ANESTHESIA EVENT (OUTPATIENT)
Dept: GASTROENTEROLOGY | Facility: HOSPITAL | Age: 69
End: 2019-12-11

## 2019-12-11 VITALS
WEIGHT: 159.3 LBS | RESPIRATION RATE: 16 BRPM | SYSTOLIC BLOOD PRESSURE: 123 MMHG | OXYGEN SATURATION: 98 % | BODY MASS INDEX: 25.6 KG/M2 | TEMPERATURE: 98.1 F | DIASTOLIC BLOOD PRESSURE: 73 MMHG | HEIGHT: 66 IN | HEART RATE: 69 BPM

## 2019-12-11 DIAGNOSIS — Z12.11 SCREENING FOR COLON CANCER: ICD-10-CM

## 2019-12-11 PROCEDURE — 25010000002 PROPOFOL 10 MG/ML EMULSION: Performed by: NURSE ANESTHETIST, CERTIFIED REGISTERED

## 2019-12-11 PROCEDURE — S0260 H&P FOR SURGERY: HCPCS | Performed by: SURGERY

## 2019-12-11 PROCEDURE — 88305 TISSUE EXAM BY PATHOLOGIST: CPT | Performed by: SURGERY

## 2019-12-11 PROCEDURE — 43239 EGD BIOPSY SINGLE/MULTIPLE: CPT | Performed by: SURGERY

## 2019-12-11 PROCEDURE — 45378 DIAGNOSTIC COLONOSCOPY: CPT | Performed by: SURGERY

## 2019-12-11 DEVICE — DEV CLIP ENDO RESOLUTION360 CONTRL ROT 235CM: Type: IMPLANTABLE DEVICE | Site: STOMACH | Status: FUNCTIONAL

## 2019-12-11 RX ORDER — GLYCOPYRROLATE 0.2 MG/ML
INJECTION INTRAMUSCULAR; INTRAVENOUS AS NEEDED
Status: DISCONTINUED | OUTPATIENT
Start: 2019-12-11 | End: 2019-12-11 | Stop reason: SURG

## 2019-12-11 RX ORDER — SODIUM CHLORIDE, SODIUM LACTATE, POTASSIUM CHLORIDE, CALCIUM CHLORIDE 600; 310; 30; 20 MG/100ML; MG/100ML; MG/100ML; MG/100ML
1000 INJECTION, SOLUTION INTRAVENOUS CONTINUOUS
Status: DISCONTINUED | OUTPATIENT
Start: 2019-12-11 | End: 2019-12-11 | Stop reason: HOSPADM

## 2019-12-11 RX ORDER — SODIUM CHLORIDE 0.9 % (FLUSH) 0.9 %
10 SYRINGE (ML) INJECTION AS NEEDED
Status: DISCONTINUED | OUTPATIENT
Start: 2019-12-11 | End: 2019-12-11 | Stop reason: HOSPADM

## 2019-12-11 RX ORDER — LIDOCAINE HYDROCHLORIDE 20 MG/ML
INJECTION, SOLUTION INFILTRATION; PERINEURAL AS NEEDED
Status: DISCONTINUED | OUTPATIENT
Start: 2019-12-11 | End: 2019-12-11 | Stop reason: SURG

## 2019-12-11 RX ORDER — PROPOFOL 10 MG/ML
VIAL (ML) INTRAVENOUS CONTINUOUS PRN
Status: DISCONTINUED | OUTPATIENT
Start: 2019-12-11 | End: 2019-12-11 | Stop reason: SURG

## 2019-12-11 RX ORDER — LIDOCAINE HYDROCHLORIDE 10 MG/ML
0.5 INJECTION, SOLUTION INFILTRATION; PERINEURAL ONCE AS NEEDED
Status: DISCONTINUED | OUTPATIENT
Start: 2019-12-11 | End: 2019-12-11 | Stop reason: HOSPADM

## 2019-12-11 RX ADMIN — SODIUM CHLORIDE, POTASSIUM CHLORIDE, SODIUM LACTATE AND CALCIUM CHLORIDE 1000 ML: 600; 310; 30; 20 INJECTION, SOLUTION INTRAVENOUS at 09:33

## 2019-12-11 RX ADMIN — GLYCOPYRROLATE 0.2 MCG: 0.2 INJECTION INTRAMUSCULAR; INTRAVENOUS at 09:45

## 2019-12-11 RX ADMIN — EPHEDRINE SULFATE 10 MG: 50 INJECTION INTRAMUSCULAR; INTRAVENOUS; SUBCUTANEOUS at 10:18

## 2019-12-11 RX ADMIN — PROPOFOL 200 MCG/KG/MIN: 10 INJECTION, EMULSION INTRAVENOUS at 09:53

## 2019-12-11 RX ADMIN — LIDOCAINE HYDROCHLORIDE 60 MG: 20 INJECTION, SOLUTION INFILTRATION; PERINEURAL at 09:53

## 2019-12-11 NOTE — ADDENDUM NOTE
Addendum  created 12/11/19 1120 by Avinash Espinal MD    Review and Sign - Ready for Procedure, Review and Sign - Signed

## 2019-12-11 NOTE — DISCHARGE INSTRUCTIONS
For the next 24 hours patient needs to be with a responsible adult.    For 24 hours DO NOT drive, operate machinery, appliances, drink alcohol, make important decisions or sign legal documents.    Start with a light or bland diet if you are feeling sick to your stomach otherwise advance to regular diet as tolerated.    Follow recommendations on procedure report if provided by your doctor.    Call Dr Taveras for problems [phone]    Problems may include but not limited to: large amounts of bleeding, trouble breathing, repeated vomiting, severe unrelieved pain, fever or chills.

## 2019-12-11 NOTE — ANESTHESIA POSTPROCEDURE EVALUATION
"Patient: Yeyo Abebe    Procedure Summary     Date:  12/11/19 Room / Location:   [facility]    Anesthesia Start:  0945 Anesthesia Stop:  1040    Procedures:       COLONOSCOPY TO CECUM (N/A )      ESOPHAGOGASTRODUODENOSCOPY WITH COLD BIOPSIES, CLIP PLACEMENT X1 (Esophagus) Diagnosis:       Screening for colon cancer      (Screening for colon cancer [Z12.11])    Surgeon:  Shayan Taveras MD Provider:  Ethan Henriquez MD    Anesthesia Type:  MAC ASA Status:  2          Anesthesia Type: MAC    Vitals  Vitals Value Taken Time   /73 12/11/2019 10:58 AM   Temp     Pulse 69 12/11/2019 10:58 AM   Resp 16 12/11/2019 10:58 AM   SpO2 98 % 12/11/2019 10:58 AM           Post Anesthesia Care and Evaluation    Patient location during evaluation: bedside  Patient participation: complete - patient participated  Level of consciousness: sleepy but conscious  Pain score: 0  Pain management: adequate  Airway patency: patent  Anesthetic complications: No anesthetic complications    Cardiovascular status: acceptable  Respiratory status: acceptable  Hydration status: acceptable    Comments: /73   Pulse 69   Temp 36.7 °C (98.1 °F) (Oral)   Resp 16   Ht 167.6 cm (66\")   Wt 72.3 kg (159 lb 4.8 oz)   SpO2 98%   BMI 25.71 kg/m²         "

## 2019-12-11 NOTE — ANESTHESIA PREPROCEDURE EVALUATION
" Anesthesia Evaluation                  Airway   Mallampati: IV  Possible difficult intubation and Small opening  Dental      Pulmonary - normal exam   (-) COPD, sleep apnea, not a smoker    ROS comment: \"vocal cord dysfunction\" leading to spontaneous laryngospasm while awake.  Cardiovascular - normal exam    (+) hypertension, hyperlipidemia,       Neuro/Psych  GI/Hepatic/Renal/Endo    (+)  GERD,  renal disease stones,     Musculoskeletal     Abdominal    Substance History      OB/GYN          Other                        Anesthesia Plan    ASA 2     MAC       Anesthetic plan, all risks, benefits, and alternatives have been provided, discussed and informed consent has been obtained with: patient.      "

## 2019-12-11 NOTE — H&P
CHIEF COMPLAINT:    Chronic anemia.    HISTORY OF PRESENT ILLNESS:    Yeyo Abebe is a 69 y.o. male who is here today for an EGD and colonoscopy to evaluate chronic anemia and heme positive stool. He has a negative immediate family history, and is currently asymptomatic with regard to GI complaints.  His most recent occult blood testing of the stool was negative.  I performed an EGD which was macerated on 5/25/2016 for evaluation of hiatal hernia.  There was small.  There was minimal esophagitis.  There were no biopsies obtained.  Dr. Shayan Martin performed his most recent colonoscopy on 3/16/2009.  I was unable to find the note for that procedure.  He describes his bowel movements have been normal.  There is no diarrhea.  He is not constipated.  He has a bowel movement every day.  A history of hiatal hernia.  He has reflux daily.  There is minimal dysphagia.    Past Medical History:   Diagnosis Date   • GERD (gastroesophageal reflux disease)    • Hypertension    • Personal history of kidney stones    • Prostate CA (CMS/HCC)        Past Surgical History:   Procedure Laterality Date   • ABDOMINAL HERNIA REPAIR N/A 2008   • BACK SURGERY N/A 1958    Dr. Hurt   • COLONOSCOPY N/A 2010    [facility]   • ENDOSCOPY N/A 5/25/2016    Procedure: ESOPHAGOGASTRODUODENOSCOPY WITH ANESTHESIA;  Surgeon: Shayan Taveras MD;  Location: [facility] ENDOSCOPY;  Service:    • PROSTATE RADIOACTIVE SEED IMPLANT     • SPINAL FUSION      child         Current Facility-Administered Medications:   •  lactated ringers infusion 1,000 mL, 1,000 mL, Intravenous, Continuous, Shayan Taveras MD, Last Rate: 25 mL/hr at 12/11/19 0983  •  lidocaine (XYLOCAINE) 1 % injection 0.5 mL, 0.5 mL, Intradermal, Once PRN, Shayan Taveras MD  •  sodium chloride 0.9 % flush 10 mL, 10 mL, Intravenous, PRN, Shayan Taveras MD    Allergies   Allergen Reactions   • Aspirin-Caffeine Other (See Comments)     SWEATING     • Codeine Anxiety      "ALSO SWEATING AND ELEVATED BODY TEMPERATURE       History reviewed. No pertinent family history.    Social History     Socioeconomic History   • Marital status:      Spouse name: Not on file   • Number of children: Not on file   • Years of education: Not on file   • Highest education level: Not on file   Tobacco Use   • Smoking status: Never Smoker   • Smokeless tobacco: Never Used   Substance and Sexual Activity   • Alcohol use: Yes     Alcohol/week: 1.0 standard drinks     Types: 1 Cans of beer per week     Comment: seldom, socially   • Drug use: No   • Sexual activity: Defer       Review of Systems   HENT: Positive for rhinorrhea, sneezing and tinnitus.    Eyes: Positive for discharge and itching.   Respiratory: Positive for wheezing.    Cardiovascular:        Irregular heartbeat   Gastrointestinal: Positive for anal bleeding.        Reflux   Endocrine: Positive for cold intolerance.   Allergic/Immunologic: Positive for environmental allergies.   All other systems reviewed and are negative.      Objective     /87 (BP Location: Left arm, Patient Position: Lying)   Pulse 71   Temp 98.1 °F (36.7 °C) (Oral)   Resp 16   Ht 167.6 cm (66\")   Wt 72.3 kg (159 lb 4.8 oz)   SpO2 97%   BMI 25.71 kg/m²     Physical Exam   Constitutional: He is oriented to person, place, and time. He appears well-developed and well-nourished.   HENT:   Head: Normocephalic and atraumatic.   Eyes: Conjunctivae are normal. No scleral icterus.   Cardiovascular: Normal rate, regular rhythm, normal heart sounds and intact distal pulses.   No murmur heard.  Pulmonary/Chest: Effort normal and breath sounds normal. No respiratory distress. He has no wheezes. He has no rales.   Abdominal: Soft. Bowel sounds are normal. There is no tenderness. There is no guarding.   Musculoskeletal: He exhibits no edema or deformity.   Neurological: He is alert and oriented to person, place, and time.   Skin: Skin is warm and dry.   Psychiatric: He " has a normal mood and affect.   Vitals reviewed.      DIAGNOSTIC DATA:    I reviewed the operative note from his prior EGD. There was a small hiatal hernia.  The GE junction was positioned at 40 cm from the incisors.  There were no ulcers or polyps.    ASSESSMENT:    Chronic anemia  History of hiatal hernia    PLAN:    Uneventful bowel prep yesterday.  EGD and colonoscopy today.

## 2019-12-11 NOTE — OP NOTE
Preoperative diagnosis:   Chronic anemia.  Postoperative diagnosis:   Hiatal hernia.   Multiple fundic gland polyps in the stomach.  Diverticulosis.  Procedure:   EGD with biopsy.  Colonoscopy.  Attending surgeon: Shayan Taveras M.D.  Anesthesia: MAC  Specimens:   Gastric polyp.    Blood loss: 5 mL.  Drains: None.  Scope withdrawal time: 9:26.    Indications: Patient is a 69 y.o. male who has a history of chronic anemia.  He has a hiatal hernia.  His last colonoscopic exam was 10 years ago.  He w is scheduled today for EGD and colonoscopy.    Description of procedure: He is taken to the endoscopy suite and positioned on the stretcher in the left lateral decubitus position. A surgical pause was performed. After graduated amounts of propofol were administered, he experienced oxygen desaturation to the 60% range.  An endoscopic laryngeal mask airway was inserted.  Saturations corrected to 100%.  I was able to perform the exam through the device.    The esophagus was examined upon advancement of the scope. It was normal. There was a small hiatal hernia. The GE junction occurred at 38 cm from the incisors. It was easily traversed.     The stomach was entered. A retroflexed view confirmed that there was a small hiatal hernia. There was no gastritis.  There were multiple gastric polyps.  He had the appearance of fundic gland polyps.  One was removed with a cold biopsy forceps.  Hemostasis was achieved with application of a clip.. A biopsy of the antrum was obtained. The pylorus was traversed. The bulb, second and third portions were normal. The endoscope was withdrawn into the stomach. The polypectomy site was hemostatic. The insufflated air was evacuated. The scope was completely withdrawn.    A digital rectal exam was performed. It was normal.     The flexible colonoscope was inserted into the anus and advanced to the level of the cecum without difficulty. The ileocecal valve was identified. The appendiceal orifice  was identified and photographed.  It appeared normal.  The intraluminal surface of the colon was examined upon withdrawal scope.    The bowel prep was excellent. There was clear liquid within all segments of the colon that was easily evacuated with the suction channel the scope. There were sigmoid diverticula. There were no polyps. There were no vascular malformations. There were no hemorrhoids.    He tolerated the procedure very well, and is returned to the recovery area in stable condition.

## 2019-12-12 LAB
CYTO UR: NORMAL
LAB AP CASE REPORT: NORMAL
PATH REPORT.FINAL DX SPEC: NORMAL
PATH REPORT.GROSS SPEC: NORMAL

## 2020-01-06 ENCOUNTER — TELEPHONE (OUTPATIENT)
Dept: FAMILY MEDICINE CLINIC | Facility: CLINIC | Age: 70
End: 2020-01-06

## 2020-01-06 DIAGNOSIS — K21.9 LPRD (LARYNGOPHARYNGEAL REFLUX DISEASE): Primary | ICD-10-CM

## 2020-01-06 RX ORDER — OMEPRAZOLE 40 MG/1
40 CAPSULE, DELAYED RELEASE ORAL
Qty: 60 CAPSULE | Refills: 2 | Status: SHIPPED | OUTPATIENT
Start: 2020-01-06 | End: 2020-03-30

## 2020-01-06 NOTE — TELEPHONE ENCOUNTER
PT MISSED YOUR CALL, PLEASE CALL AGAIN.  [phone]      PT SAYS THE LAST FEW MONTHS HIS ACID REFLUX HAS GOTTEN WORSE.HE WOULD LIKE TO SPEAK TO YOU TO DECIDE WHAT TO DO REGARDING THIS.  PLEASE CALL [phone]

## 2020-01-16 DIAGNOSIS — I10 BENIGN ESSENTIAL HTN: ICD-10-CM

## 2020-01-16 RX ORDER — METOPROLOL TARTRATE 50 MG/1
TABLET, FILM COATED ORAL
Qty: 270 TABLET | Refills: 0 | Status: SHIPPED | OUTPATIENT
Start: 2020-01-16 | End: 2020-07-21

## 2020-01-25 DIAGNOSIS — K21.9 GASTROESOPHAGEAL REFLUX DISEASE WITHOUT ESOPHAGITIS: ICD-10-CM

## 2020-01-27 RX ORDER — PANTOPRAZOLE SODIUM 40 MG/1
40 TABLET, DELAYED RELEASE ORAL DAILY
Qty: 90 TABLET | Refills: 0 | OUTPATIENT
Start: 2020-01-27

## 2020-03-06 ENCOUNTER — OFFICE VISIT (OUTPATIENT)
Dept: FAMILY MEDICINE CLINIC | Facility: CLINIC | Age: 70
End: 2020-03-06

## 2020-03-06 VITALS
OXYGEN SATURATION: 98 % | WEIGHT: 167.8 LBS | SYSTOLIC BLOOD PRESSURE: 110 MMHG | BODY MASS INDEX: 26.97 KG/M2 | HEIGHT: 66 IN | TEMPERATURE: 98.1 F | HEART RATE: 59 BPM | RESPIRATION RATE: 20 BRPM | DIASTOLIC BLOOD PRESSURE: 80 MMHG

## 2020-03-06 DIAGNOSIS — E78.00 HYPERCHOLESTEROLEMIA: ICD-10-CM

## 2020-03-06 DIAGNOSIS — I10 BENIGN ESSENTIAL HTN: Primary | ICD-10-CM

## 2020-03-06 DIAGNOSIS — M41.30 THORACOGENIC SCOLIOSIS, UNSPECIFIED SPINAL REGION: ICD-10-CM

## 2020-03-06 DIAGNOSIS — Z85.46 HISTORY OF PROSTATE CANCER: ICD-10-CM

## 2020-03-06 DIAGNOSIS — E22.2 SYNDROME OF INAPPROPRIATE SECRETION OF ANTIDIURETIC HORMONE (HCC): ICD-10-CM

## 2020-03-06 DIAGNOSIS — I10 ESSENTIAL HYPERTENSION: ICD-10-CM

## 2020-03-06 PROCEDURE — 99213 OFFICE O/P EST LOW 20 MIN: CPT | Performed by: FAMILY MEDICINE

## 2020-03-06 NOTE — PROGRESS NOTES
HPI  Yeyo Abebe is a 69 y.o. male who is here for follow up of hypertension.  Has completed radiation therapy for prostate cancer and is getting PSA levels every 3 months.  Other lab work reviewed from 4 months ago and basically unremarkable.  Colonoscopy and other endoscopic procedures also reviewed as below.  Overall denies any new complaints.  Does report some occasional problems with left index finger catching and this was discussed.      Review of Systems   HENT: Positive for tinnitus.    Eyes: Positive for discharge.   Cardiovascular: Positive for palpitations.   All other systems reviewed and are negative.        Past Medical History:   Diagnosis Date   • GERD (gastroesophageal reflux disease)    • Hypertension    • Personal history of kidney stones    • Prostate CA (CMS/HCC)        Past Surgical History:   Procedure Laterality Date   • ABDOMINAL HERNIA REPAIR N/A 2008   • BACK SURGERY N/A 1958    Dr. Hurt   • COLONOSCOPY N/A 2010    [facility]   • COLONOSCOPY N/A 12/11/2019    Procedure: COLONOSCOPY TO CECUM;  Surgeon: Shayan Taveras MD;  Location: [facility] ENDOSCOPY;  Service: General   • ENDOSCOPY N/A 5/25/2016    Procedure: ESOPHAGOGASTRODUODENOSCOPY WITH ANESTHESIA;  Surgeon: Shayan Taveras MD;  Location: [facility] ENDOSCOPY;  Service:    • ENDOSCOPY  12/11/2019    Procedure: ESOPHAGOGASTRODUODENOSCOPY WITH COLD BIOPSIES, CLIP PLACEMENT X1;  Surgeon: Shayan Taveras MD;  Location: [facility] ENDOSCOPY;  Service: General   • PROSTATE RADIOACTIVE SEED IMPLANT     • SPINAL FUSION      child       No family history on file.    Social History     Socioeconomic History   • Marital status:      Spouse name: Not on file   • Number of children: Not on file   • Years of education: Not on file   • Highest education level: Not on file   Tobacco Use   • Smoking status: Never Smoker   • Smokeless tobacco: Never Used   Substance and Sexual Activity   • Alcohol use: Yes     Alcohol/week: 1.0  standard drinks     Types: 1 Cans of beer per week     Comment: seldom, socially   • Drug use: No   • Sexual activity: Defer         Physical Exam   Constitutional: He is oriented to person, place, and time. He appears well-developed and well-nourished.   HENT:   Head: Normocephalic and atraumatic.   Nose: Nose normal.   Mouth/Throat: Oropharynx is clear and moist.   Eyes: Pupils are equal, round, and reactive to light. Conjunctivae and EOM are normal.   Neck: Normal range of motion. Neck supple.   Cardiovascular: Normal rate, regular rhythm and normal heart sounds.   Pulmonary/Chest: Effort normal and breath sounds normal.   Abdominal: Soft. He exhibits no distension.   Musculoskeletal: Normal range of motion. He exhibits no edema or deformity.   Neurological: He is alert and oriented to person, place, and time. Coordination normal.   Skin: Skin is warm and dry.   Psychiatric: He has a normal mood and affect. His behavior is normal. Judgment and thought content normal.   Nursing note and vitals reviewed.        Assessment/Plan    Yeyo was seen today for hypertension.    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  Overall seems to be doing extremely well on current regimen.  PSA levels monitored every 3 months by urologist.  Currently is not on hormone therapy.  Recommend continuing current therapy and recheck with Medicare wellness evaluation in 6 months.    This note includes information entered using a voice recognition dictation system.  Though reviewed, some nonsensible errors may remain.

## 2020-03-29 DIAGNOSIS — K21.9 LPRD (LARYNGOPHARYNGEAL REFLUX DISEASE): ICD-10-CM

## 2020-03-30 RX ORDER — OMEPRAZOLE 40 MG/1
CAPSULE, DELAYED RELEASE ORAL
Qty: 60 CAPSULE | Refills: 2 | Status: SHIPPED | OUTPATIENT
Start: 2020-03-30 | End: 2020-06-25

## 2020-06-02 ENCOUNTER — PATIENT MESSAGE (OUTPATIENT)
Dept: FAMILY MEDICINE CLINIC | Facility: CLINIC | Age: 70
End: 2020-06-02

## 2020-06-02 DIAGNOSIS — I49.9 CARDIAC ARRHYTHMIA, UNSPECIFIED CARDIAC ARRHYTHMIA TYPE: Primary | ICD-10-CM

## 2020-06-02 DIAGNOSIS — R07.9 CHEST PAIN, UNSPECIFIED TYPE: Primary | ICD-10-CM

## 2020-06-03 ENCOUNTER — TRANSCRIBE ORDERS (OUTPATIENT)
Dept: CARDIOLOGY | Facility: HOSPITAL | Age: 70
End: 2020-06-03

## 2020-06-03 ENCOUNTER — HOSPITAL ENCOUNTER (OUTPATIENT)
Dept: CARDIOLOGY | Facility: HOSPITAL | Age: 70
Discharge: HOME | End: 2020-06-03
Admitting: FAMILY MEDICINE

## 2020-06-03 DIAGNOSIS — Z01.811 PRE-OP CHEST EXAM: Primary | ICD-10-CM

## 2020-06-03 PROCEDURE — 93005 ELECTROCARDIOGRAM TRACING: CPT

## 2020-06-03 PROCEDURE — 93010 ELECTROCARDIOGRAM REPORT: CPT | Performed by: INTERNAL MEDICINE

## 2020-06-25 DIAGNOSIS — K21.9 LPRD (LARYNGOPHARYNGEAL REFLUX DISEASE): ICD-10-CM

## 2020-06-25 RX ORDER — OMEPRAZOLE 40 MG/1
CAPSULE, DELAYED RELEASE ORAL
Qty: 60 CAPSULE | Refills: 2 | Status: SHIPPED | OUTPATIENT
Start: 2020-06-25 | End: 2020-09-28

## 2020-07-21 DIAGNOSIS — I10 BENIGN ESSENTIAL HTN: ICD-10-CM

## 2020-07-21 RX ORDER — METOPROLOL TARTRATE 50 MG/1
TABLET, FILM COATED ORAL
Qty: 270 TABLET | Refills: 0 | Status: SHIPPED | OUTPATIENT
Start: 2020-07-21 | End: 2020-08-14

## 2020-08-14 DIAGNOSIS — I10 BENIGN ESSENTIAL HTN: ICD-10-CM

## 2020-08-14 RX ORDER — METOPROLOL TARTRATE 50 MG/1
TABLET, FILM COATED ORAL
Qty: 270 TABLET | Refills: 0 | Status: SHIPPED | OUTPATIENT
Start: 2020-08-14 | End: 2020-11-02 | Stop reason: SDUPTHER

## 2020-09-03 DIAGNOSIS — I10 BENIGN ESSENTIAL HTN: ICD-10-CM

## 2020-09-03 RX ORDER — AMLODIPINE BESYLATE 5 MG/1
TABLET ORAL
Qty: 90 TABLET | Refills: 3 | Status: SHIPPED | OUTPATIENT
Start: 2020-09-03 | End: 2021-07-26

## 2020-09-21 ENCOUNTER — OFFICE VISIT (OUTPATIENT)
Dept: FAMILY MEDICINE CLINIC | Facility: CLINIC | Age: 70
End: 2020-09-21

## 2020-09-21 VITALS
HEART RATE: 60 BPM | BODY MASS INDEX: 24.81 KG/M2 | RESPIRATION RATE: 20 BRPM | SYSTOLIC BLOOD PRESSURE: 130 MMHG | HEIGHT: 66 IN | TEMPERATURE: 98.2 F | WEIGHT: 154.4 LBS | OXYGEN SATURATION: 99 % | DIASTOLIC BLOOD PRESSURE: 80 MMHG

## 2020-09-21 DIAGNOSIS — M47.816 OSTEOARTHRITIS OF LUMBAR SPINE, UNSPECIFIED SPINAL OSTEOARTHRITIS COMPLICATION STATUS: ICD-10-CM

## 2020-09-21 DIAGNOSIS — Z79.899 HIGH RISK MEDICATION USE: ICD-10-CM

## 2020-09-21 DIAGNOSIS — K21.9 GASTROESOPHAGEAL REFLUX DISEASE WITHOUT ESOPHAGITIS: ICD-10-CM

## 2020-09-21 DIAGNOSIS — Z00.00 MEDICARE ANNUAL WELLNESS VISIT, INITIAL: Primary | ICD-10-CM

## 2020-09-21 DIAGNOSIS — Z85.46 HISTORY OF PROSTATE CANCER: ICD-10-CM

## 2020-09-21 DIAGNOSIS — I10 ESSENTIAL HYPERTENSION: ICD-10-CM

## 2020-09-21 PROBLEM — E78.00 HYPERCHOLESTEROLEMIA: Status: RESOLVED | Noted: 2019-09-06 | Resolved: 2020-09-21

## 2020-09-21 PROCEDURE — G0438 PPPS, INITIAL VISIT: HCPCS | Performed by: FAMILY MEDICINE

## 2020-09-21 NOTE — PROGRESS NOTES
The ABCs of the Annual Wellness Visit  Initial Medicare Wellness Visit    Chief Complaint   Patient presents with   • Medicare Wellness-Initial Visit       Subjective   History of Present Illness:  Yeyo Abebe is a 70 y.o. male who presents for an Initial Medicare Wellness Visit.    HEALTH RISK ASSESSMENT    Recent Hospitalizations:  No hospitalization(s) within the last year.    Current Medical Providers:  Patient Care Team:  Milton Dobbins MD as PCP - General (Family Medicine)  Yimi Morales Jr., MD as PCP - Claims Attributed  Shayan Taveras MD as Consulting Physician (General Surgery)    Smoking Status:  Social History     Tobacco Use   Smoking Status Never Smoker   Smokeless Tobacco Never Used       Alcohol Consumption:  Social History     Substance and Sexual Activity   Alcohol Use Yes   • Alcohol/week: 1.0 standard drinks   • Types: 1 Cans of beer per week    Comment: seldom, socially       Depression Screen:   PHQ-2/PHQ-9 Depression Screening 9/21/2020   Little interest or pleasure in doing things 0   Feeling down, depressed, or hopeless 0   Trouble falling or staying asleep, or sleeping too much 0   Feeling tired or having little energy 0   Poor appetite or overeating 0   Feeling bad about yourself - or that you are a failure or have let yourself or your family down 0   Trouble concentrating on things, such as reading the newspaper or watching television 0   Moving or speaking so slowly that other people could have noticed. Or the opposite - being so fidgety or restless that you have been moving around a lot more than usual 0   Thoughts that you would be better off dead, or of hurting yourself in some way 0   Total Score 0   If you checked off any problems, how difficult have these problems made it for you to do your work, take care of things at home, or get along with other people? Not difficult at all       Fall Risk Screen:  JOSE MADI Fall Risk Assessment was completed, and patient is at LOW  risk for falls.Assessment completed on:9/21/2020    Health Habits and Functional and Cognitive Screening:  Functional & Cognitive Status 9/21/2020   Do you have difficulty preparing food and eating? No   Do you have difficulty bathing yourself, getting dressed or grooming yourself? No   Do you have difficulty using the toilet? No   Do you have difficulty moving around from place to place? No   Do you have trouble with steps or getting out of a bed or a chair? No   Current Diet Unhealthy Diet   Dental Exam Up to date   Eye Exam Not up to date   Exercise (times per week) 4 times per week   Current Exercise Activities Include Walking   Do you need help using the phone?  No   Are you deaf or do you have serious difficulty hearing?  No   Do you need help with transportation? No   Do you need help shopping? No   Do you need help preparing meals?  No   Do you need help with housework?  No   Do you need help with laundry? No   Do you need help taking your medications? No   Do you need help managing money? No   Do you ever drive or ride in a car without wearing a seat belt? No   Have you felt unusual stress, anger or loneliness in the last month? No   Who do you live with? Alone   If you need help, do you have trouble finding someone available to you? No   Have you been bothered in the last four weeks by sexual problems? No         Does the patient have evidence of cognitive impairment? NO    Asprin use counseling:Does not need ASA (and currently is not on it)    Age-appropriate Screening Schedule:  Refer to the list below for future screening recommendations based on patient's age, sex and/or medical conditions. Orders for these recommended tests are listed in the plan section. The patient has been provided with a written plan.    Health Maintenance   Topic Date Due   • TDAP/TD VACCINES (1 - Tdap) 08/13/1968   • ZOSTER VACCINE (1 of 2) 08/13/1999   • INFLUENZA VACCINE  08/01/2020   • LIPID PANEL  11/07/2020   • COLONOSCOPY   12/11/2029          The following portions of the patient's history were reviewed and updated as appropriate: past family history, past social history and past surgical history.    Outpatient Medications Prior to Visit   Medication Sig Dispense Refill   • amLODIPine (NORVASC) 5 MG tablet TAKE 1 TABLET BY MOUTH DAILY 90 tablet 3   • Calcium-Magnesium-Vitamin D (CALCIUM 1200+D3 PO) Take  by mouth.     • Loratadine 10 MG capsule Take 1 tablet by mouth daily.     • metoprolol tartrate (LOPRESSOR) 50 MG tablet TAKE 1&1/2 TABLETS BY MOUTH TWICE DAILY 270 tablet 0   • omeprazole (priLOSEC) 40 MG capsule TAKE 1 CAPSULE BY MOUTH TWICE DAILY BEFORE MEALS 60 capsule 2   • tamsulosin (FLOMAX) 0.4 MG capsule 24 hr capsule Take 0.4 mg by mouth 2 (Two) Times a Day.  1   • ibuprofen (ADVIL,MOTRIN) 200 MG tablet Take 200 mg by mouth Every 6 (Six) Hours As Needed for Mild Pain .       No facility-administered medications prior to visit.        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 Patient does not have an advance directive, information provided.    Review of Systems   All other systems reviewed and are negative.      Compared to one year ago, the patient feels his physical health is better.  Compared to one year ago, the patient feels his mental health is better.    Reviewed chart for  "potential of high risk medication in the elderly: no  Reviewed chart for potential of harmful drug interactions in the elderly:no    Objective         Vitals:    09/21/20 1424   BP: 130/80   BP Location: Left arm   Patient Position: Sitting   Cuff Size: Adult   Pulse: 60   Resp: 20   Temp: 98.2 °F (36.8 °C)   SpO2: 99%   Weight: 70 kg (154 lb 6.4 oz)   Height: 167.6 cm (66\")       Body mass index is 24.92 kg/m².  Discussed the patient's BMI with him. The BMI is in the acceptable range.    Physical Exam  Vitals signs and nursing note reviewed. Exam conducted with a chaperone present.   Constitutional:       General: He is not in acute distress.     Appearance: Normal appearance. He is well-developed and normal weight. He is not ill-appearing.   HENT:      Head: Normocephalic and atraumatic.   Eyes:      Conjunctiva/sclera: Conjunctivae normal.      Pupils: Pupils are equal, round, and reactive to light.   Neck:      Musculoskeletal: Normal range of motion.      Thyroid: No thyromegaly.   Cardiovascular:      Rate and Rhythm: Normal rate and regular rhythm.      Heart sounds: Normal heart sounds.   Pulmonary:      Effort: Pulmonary effort is normal. No respiratory distress.      Breath sounds: Normal breath sounds.   Abdominal:      General: There is no distension.      Palpations: Abdomen is soft. There is no mass.      Tenderness: There is no abdominal tenderness.      Hernia: No hernia is present.   Musculoskeletal: Normal range of motion.         General: No tenderness or deformity.   Lymphadenopathy:      Cervical: No cervical adenopathy.   Skin:     General: Skin is warm and dry.      Coloration: Skin is not pale.      Findings: No rash.   Neurological:      Mental Status: He is alert and oriented to person, place, and time.      Motor: No abnormal muscle tone.      Coordination: Coordination normal.   Psychiatric:         Mood and Affect: Mood normal.         Behavior: Behavior normal.         Thought Content: " Thought content normal.         Judgment: Judgment normal.               Assessment/Plan   Medicare Risks and Personalized Health Plan  CMS Preventative Services Quick Reference  Advance Directive Discussion    The above risks/problems have been discussed with the patient.  Pertinent information has been shared with the patient in the After Visit Summary.  Follow up plans and orders are seen below in the Assessment/Plan Section.    Diagnoses and all orders for this visit:    1. Medicare annual wellness visit, initial (Primary)    2. Benign essential HTN    3. Essential hypertension  -     Comprehensive Metabolic Panel    4. Osteoarthritis of lumbar spine, unspecified spinal osteoarthritis complication status    5. High risk medication use  -     Comprehensive Metabolic Panel  -     CBC & Differential    6. Gastroesophageal reflux disease without esophagitis      Follow Up:  Patient here for Medicare wellness evaluation.  Continues with reflux symptoms and discussed hiatal hernias etc.  Did have colonoscopy and EGD last December.  Admits to with avoids certain foods etc.  Does have head of bed propped up and avoids late night food.  Retired at the beginning of pandemic and apparently doing well.  Healthy diet and exercise recommended.  Will be getting flu shot at local pharmacy.  Also recommended asking about Pneumovax 23 since their computer is able to verify coverage.    This note includes information entered using a voice recognition dictation system.  Though reviewed, some nonsensible errors may remain.      An After Visit Summary and PPPS were given to the patient.

## 2020-09-22 LAB
ALBUMIN SERPL-MCNC: 4.2 G/DL (ref 3.7–4.7)
ALBUMIN/GLOB SERPL: 1.7 {RATIO} (ref 1.2–2.2)
ALP SERPL-CCNC: 56 IU/L (ref 39–117)
ALT SERPL-CCNC: 15 IU/L (ref 0–44)
AST SERPL-CCNC: 27 IU/L (ref 0–40)
BASOPHILS # BLD AUTO: 0 X10E3/UL (ref 0–0.2)
BASOPHILS NFR BLD AUTO: 0 %
BILIRUB SERPL-MCNC: 0.4 MG/DL (ref 0–1.2)
BUN SERPL-MCNC: 9 MG/DL (ref 8–27)
BUN/CREAT SERPL: 13 (ref 10–24)
CALCIUM SERPL-MCNC: 9 MG/DL (ref 8.6–10.2)
CHLORIDE SERPL-SCNC: 88 MMOL/L (ref 96–106)
CO2 SERPL-SCNC: 29 MMOL/L (ref 20–29)
CREAT SERPL-MCNC: 0.69 MG/DL (ref 0.76–1.27)
EOSINOPHIL # BLD AUTO: 0.1 X10E3/UL (ref 0–0.4)
EOSINOPHIL NFR BLD AUTO: 2 %
ERYTHROCYTE [DISTWIDTH] IN BLOOD BY AUTOMATED COUNT: 12 % (ref 11.6–15.4)
GLOBULIN SER CALC-MCNC: 2.5 G/DL (ref 1.5–4.5)
GLUCOSE SERPL-MCNC: ABNORMAL MG/DL
HCT VFR BLD AUTO: 39.7 % (ref 37.5–51)
HGB BLD-MCNC: 13.4 G/DL (ref 13–17.7)
IMM GRANULOCYTES # BLD AUTO: 0 X10E3/UL (ref 0–0.1)
IMM GRANULOCYTES NFR BLD AUTO: 0 %
LYMPHOCYTES # BLD AUTO: 0.7 X10E3/UL (ref 0.7–3.1)
LYMPHOCYTES NFR BLD AUTO: 13 %
MCH RBC QN AUTO: 32.1 PG (ref 26.6–33)
MCHC RBC AUTO-ENTMCNC: 33.8 G/DL (ref 31.5–35.7)
MCV RBC AUTO: 95 FL (ref 79–97)
MONOCYTES # BLD AUTO: 0.6 X10E3/UL (ref 0.1–0.9)
MONOCYTES NFR BLD AUTO: 11 %
NEUTROPHILS # BLD AUTO: 4.1 X10E3/UL (ref 1.4–7)
NEUTROPHILS NFR BLD AUTO: 74 %
PLATELET # BLD AUTO: 192 X10E3/UL (ref 150–450)
POTASSIUM SERPL-SCNC: ABNORMAL MMOL/L
PROT SERPL-MCNC: 6.7 G/DL (ref 6–8.5)
RBC # BLD AUTO: 4.17 X10E6/UL (ref 4.14–5.8)
SODIUM SERPL-SCNC: 128 MMOL/L (ref 134–144)
WBC # BLD AUTO: 5.6 X10E3/UL (ref 3.4–10.8)

## 2020-09-24 DIAGNOSIS — K21.9 LPRD (LARYNGOPHARYNGEAL REFLUX DISEASE): ICD-10-CM

## 2020-09-28 RX ORDER — OMEPRAZOLE 40 MG/1
CAPSULE, DELAYED RELEASE ORAL
Qty: 60 CAPSULE | Refills: 2 | Status: SHIPPED | OUTPATIENT
Start: 2020-09-28 | End: 2020-12-29

## 2020-11-02 DIAGNOSIS — I10 BENIGN ESSENTIAL HTN: ICD-10-CM

## 2020-11-02 RX ORDER — METOPROLOL TARTRATE 50 MG/1
TABLET, FILM COATED ORAL
Qty: 270 TABLET | Refills: 3 | Status: SHIPPED | OUTPATIENT
Start: 2020-11-02 | End: 2021-11-15

## 2020-12-29 DIAGNOSIS — K21.9 LPRD (LARYNGOPHARYNGEAL REFLUX DISEASE): ICD-10-CM

## 2020-12-29 RX ORDER — OMEPRAZOLE 40 MG/1
CAPSULE, DELAYED RELEASE ORAL
Qty: 60 CAPSULE | Refills: 2 | Status: SHIPPED | OUTPATIENT
Start: 2020-12-29 | End: 2021-04-12

## 2021-03-02 DIAGNOSIS — Z23 IMMUNIZATION DUE: ICD-10-CM

## 2021-04-10 DIAGNOSIS — K21.9 LPRD (LARYNGOPHARYNGEAL REFLUX DISEASE): ICD-10-CM

## 2021-04-12 RX ORDER — OMEPRAZOLE 40 MG/1
CAPSULE, DELAYED RELEASE ORAL
Qty: 60 CAPSULE | Refills: 2 | Status: SHIPPED | OUTPATIENT
Start: 2021-04-12 | End: 2021-07-13

## 2021-04-15 ENCOUNTER — OFFICE VISIT (OUTPATIENT)
Dept: FAMILY MEDICINE CLINIC | Facility: CLINIC | Age: 71
End: 2021-04-15

## 2021-04-15 VITALS
WEIGHT: 159 LBS | TEMPERATURE: 97.9 F | RESPIRATION RATE: 18 BRPM | HEART RATE: 57 BPM | OXYGEN SATURATION: 97 % | SYSTOLIC BLOOD PRESSURE: 110 MMHG | HEIGHT: 66 IN | BODY MASS INDEX: 25.55 KG/M2 | DIASTOLIC BLOOD PRESSURE: 78 MMHG

## 2021-04-15 DIAGNOSIS — Z79.899 HIGH RISK MEDICATION USE: ICD-10-CM

## 2021-04-15 DIAGNOSIS — E22.2 SYNDROME OF INAPPROPRIATE SECRETION OF ANTIDIURETIC HORMONE (HCC): ICD-10-CM

## 2021-04-15 DIAGNOSIS — I10 ESSENTIAL HYPERTENSION: Primary | ICD-10-CM

## 2021-04-15 DIAGNOSIS — Z85.46 HISTORY OF PROSTATE CANCER: ICD-10-CM

## 2021-04-15 DIAGNOSIS — K21.9 GASTROESOPHAGEAL REFLUX DISEASE WITHOUT ESOPHAGITIS: ICD-10-CM

## 2021-04-15 DIAGNOSIS — M47.816 OSTEOARTHRITIS OF LUMBAR SPINE, UNSPECIFIED SPINAL OSTEOARTHRITIS COMPLICATION STATUS: ICD-10-CM

## 2021-04-15 PROCEDURE — 99213 OFFICE O/P EST LOW 20 MIN: CPT | Performed by: FAMILY MEDICINE

## 2021-04-15 NOTE — PROGRESS NOTES
HPI  Yeyo Abebe is a 70 y.o. male who is here for follow up especially of hypertension and reported history of hyponatremia.  Patient retired last year because of pandemic issues.  Overall seems to have adjusted well.  Does report some ankle swelling related to sitting in a chair, has improved with increased activity etc.  History of radiation therapy for prostate cancer and PSA level seem to be decreasing.      Review of Systems   Constitutional: Negative for unexpected weight change.   Cardiovascular: Positive for leg swelling.   All other systems reviewed and are negative.        Past Medical History:   Diagnosis Date   • GERD (gastroesophageal reflux disease)    • Hypertension    • Personal history of kidney stones    • Prostate CA (CMS/HCC)        Past Surgical History:   Procedure Laterality Date   • ABDOMINAL HERNIA REPAIR N/A 2008   • BACK SURGERY N/A 1958    Dr. Hurt   • COLONOSCOPY N/A 2010    [facility]   • COLONOSCOPY N/A 12/11/2019    Procedure: COLONOSCOPY TO CECUM;  Surgeon: Shayan Taveras MD;  Location: [facility] ENDOSCOPY;  Service: General   • ENDOSCOPY N/A 5/25/2016    Procedure: ESOPHAGOGASTRODUODENOSCOPY WITH ANESTHESIA;  Surgeon: Shayan Taveras MD;  Location: [facility] ENDOSCOPY;  Service:    • ENDOSCOPY  12/11/2019    Procedure: ESOPHAGOGASTRODUODENOSCOPY WITH COLD BIOPSIES, CLIP PLACEMENT X1;  Surgeon: Shayan Taveras MD;  Location: [facility] ENDOSCOPY;  Service: General   • PROSTATE RADIOACTIVE SEED IMPLANT     • SPINAL FUSION      child       No family history on file.    Social History     Socioeconomic History   • Marital status:      Spouse name: Not on file   • Number of children: Not on file   • Years of education: Not on file   • Highest education level: Not on file   Tobacco Use   • Smoking status: Never Smoker   • Smokeless tobacco: Never Used   Substance and Sexual Activity   • Alcohol use: Yes     Alcohol/week: 1.0 standard drinks     Types: 1 Cans of beer  per week     Comment: seldom, socially   • Drug use: No   • Sexual activity: Defer       Vitals:    04/15/21 0923   BP: 110/78   Pulse: 57   Resp: 18   Temp: 97.9 °F (36.6 °C)   SpO2: 97%        Body mass index is 25.66 kg/m².      Physical Exam  Vitals and nursing note reviewed.   Constitutional:       General: He is not in acute distress.     Appearance: Normal appearance. He is well-developed and normal weight.   HENT:      Head: Normocephalic and atraumatic.      Nose:      Comments: Patient with mask provider with mask and shield  Eyes:      Conjunctiva/sclera: Conjunctivae normal.      Pupils: Pupils are equal, round, and reactive to light.   Neck:      Thyroid: No thyromegaly.   Cardiovascular:      Rate and Rhythm: Normal rate and regular rhythm.      Heart sounds: Normal heart sounds.   Pulmonary:      Effort: Pulmonary effort is normal. No respiratory distress.      Breath sounds: Normal breath sounds.   Abdominal:      General: There is no distension.      Palpations: Abdomen is soft. There is no mass.      Tenderness: There is no abdominal tenderness.      Hernia: No hernia is present.   Musculoskeletal:         General: No tenderness or deformity. Normal range of motion.      Cervical back: Normal range of motion.   Lymphadenopathy:      Cervical: No cervical adenopathy.   Skin:     General: Skin is warm and dry.      Coloration: Skin is not pale.      Findings: No rash.   Neurological:      Mental Status: He is alert and oriented to person, place, and time.      Motor: No abnormal muscle tone.      Coordination: Coordination normal.   Psychiatric:         Behavior: Behavior normal.         Thought Content: Thought content normal.         Judgment: Judgment normal.           Assessment/Plan    Diagnoses and all orders for this visit:    1. Essential hypertension (Primary)  -     Basic Metabolic Panel    2. 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 History of prostate cancer    4. Osteoarthritis of lumbar spine, unspecified spinal osteoarthritis complication status    5. High risk medication use  -     CBC & Differential  -     Basic Metabolic Panel    6. Gastroesophageal reflux disease without esophagitis      Patient here for routine follow-up visit especially for hypertension.  Does remain on high-dose omeprazole for GERD symptoms.  Positional pedal edema discussed in treatment including elevation activity recommended.  Follow-up closely for history of prostate cancer.  PSA levels continue to decrease.  Will get routine lab as noted above with follow-up in 6 months including annual Medicare wellness evaluation.  Patient recalls getting 2 pneumonia vaccines at pharmacy and hopefully this can be updated?    This note includes information entered using a voice recognition dictation system.  Though reviewed, some nonsensible errors may remain.

## 2021-04-16 LAB
BASOPHILS # BLD AUTO: 0.02 10*3/MM3 (ref 0–0.2)
BASOPHILS NFR BLD AUTO: 0.4 % (ref 0–1.5)
BUN SERPL-MCNC: 10 MG/DL (ref 8–23)
BUN/CREAT SERPL: 13 (ref 7–25)
CALCIUM SERPL-MCNC: 9.4 MG/DL (ref 8.6–10.5)
CHLORIDE SERPL-SCNC: 89 MMOL/L (ref 98–107)
CO2 SERPL-SCNC: 30.3 MMOL/L (ref 22–29)
CREAT SERPL-MCNC: 0.77 MG/DL (ref 0.76–1.27)
EOSINOPHIL # BLD AUTO: 0.12 10*3/MM3 (ref 0–0.4)
EOSINOPHIL NFR BLD AUTO: 2.2 % (ref 0.3–6.2)
ERYTHROCYTE [DISTWIDTH] IN BLOOD BY AUTOMATED COUNT: 11.7 % (ref 12.3–15.4)
GLUCOSE SERPL-MCNC: 90 MG/DL (ref 65–99)
HCT VFR BLD AUTO: 37.6 % (ref 37.5–51)
HGB BLD-MCNC: 13.3 G/DL (ref 13–17.7)
IMM GRANULOCYTES # BLD AUTO: 0.02 10*3/MM3 (ref 0–0.05)
IMM GRANULOCYTES NFR BLD AUTO: 0.4 % (ref 0–0.5)
LYMPHOCYTES # BLD AUTO: 0.59 10*3/MM3 (ref 0.7–3.1)
LYMPHOCYTES NFR BLD AUTO: 10.8 % (ref 19.6–45.3)
MCH RBC QN AUTO: 33.2 PG (ref 26.6–33)
MCHC RBC AUTO-ENTMCNC: 35.4 G/DL (ref 31.5–35.7)
MCV RBC AUTO: 93.8 FL (ref 79–97)
MONOCYTES # BLD AUTO: 0.56 10*3/MM3 (ref 0.1–0.9)
MONOCYTES NFR BLD AUTO: 10.3 % (ref 5–12)
NEUTROPHILS # BLD AUTO: 4.15 10*3/MM3 (ref 1.7–7)
NEUTROPHILS NFR BLD AUTO: 75.9 % (ref 42.7–76)
NRBC BLD AUTO-RTO: 0.2 /100 WBC (ref 0–0.2)
PLATELET # BLD AUTO: 205 10*3/MM3 (ref 140–450)
POTASSIUM SERPL-SCNC: 4.3 MMOL/L (ref 3.5–5.2)
RBC # BLD AUTO: 4.01 10*6/MM3 (ref 4.14–5.8)
SODIUM SERPL-SCNC: 127 MMOL/L (ref 136–145)
WBC # BLD AUTO: 5.46 10*3/MM3 (ref 3.4–10.8)

## 2021-07-13 DIAGNOSIS — K21.9 LPRD (LARYNGOPHARYNGEAL REFLUX DISEASE): ICD-10-CM

## 2021-07-13 RX ORDER — OMEPRAZOLE 40 MG/1
CAPSULE, DELAYED RELEASE ORAL
Qty: 60 CAPSULE | Refills: 2 | Status: SHIPPED | OUTPATIENT
Start: 2021-07-13 | End: 2021-10-19

## 2021-07-26 DIAGNOSIS — I10 BENIGN ESSENTIAL HTN: ICD-10-CM

## 2021-07-26 RX ORDER — AMLODIPINE BESYLATE 5 MG/1
TABLET ORAL
Qty: 90 TABLET | Refills: 3 | Status: SHIPPED | OUTPATIENT
Start: 2021-07-26 | End: 2022-08-26 | Stop reason: SDUPTHER

## 2021-10-12 ENCOUNTER — OFFICE VISIT (OUTPATIENT)
Dept: FAMILY MEDICINE CLINIC | Facility: CLINIC | Age: 71
End: 2021-10-12

## 2021-10-12 VITALS
BODY MASS INDEX: 24.59 KG/M2 | HEART RATE: 61 BPM | HEIGHT: 66 IN | WEIGHT: 153 LBS | DIASTOLIC BLOOD PRESSURE: 74 MMHG | TEMPERATURE: 97 F | SYSTOLIC BLOOD PRESSURE: 130 MMHG | OXYGEN SATURATION: 98 %

## 2021-10-12 DIAGNOSIS — I10 PRIMARY HYPERTENSION: ICD-10-CM

## 2021-10-12 DIAGNOSIS — Z00.00 MEDICARE ANNUAL WELLNESS VISIT, SUBSEQUENT: Primary | ICD-10-CM

## 2021-10-12 DIAGNOSIS — Z79.899 HIGH RISK MEDICATION USE: ICD-10-CM

## 2021-10-12 DIAGNOSIS — E87.1 CHRONIC HYPONATREMIA: ICD-10-CM

## 2021-10-12 DIAGNOSIS — K21.9 GASTROESOPHAGEAL REFLUX DISEASE WITHOUT ESOPHAGITIS: ICD-10-CM

## 2021-10-12 DIAGNOSIS — E87.1 HYPONATREMIA: ICD-10-CM

## 2021-10-12 DIAGNOSIS — Z85.46 HISTORY OF PROSTATE CANCER: ICD-10-CM

## 2021-10-12 PROCEDURE — G0439 PPPS, SUBSEQ VISIT: HCPCS | Performed by: FAMILY MEDICINE

## 2021-10-12 NOTE — PROGRESS NOTES
The ABCs of the Annual Wellness Visit  Subsequent Medicare Wellness Visit    Chief Complaint   Patient presents with   • Medicare Wellness-subsequent   • Heartburn     No meds are fixing       Subjective    History of Present Illness:  Yeyo Abebe is a 71 y.o. male who presents for a Subsequent Medicare Wellness Visit.    The following portions of the patient's history were reviewed and   updated as appropriate: past family history, past social history and past surgical history.    Compared to one year ago, the patient feels his physical   health is the same.    Compared to one year ago, the patient feels his mental   health is the same.    Recent Hospitalizations:  He was not admitted to the hospital during the last year.       Current Medical Providers:  Patient Care Team:  Milton Dobbins MD as PCP - General (Family Medicine)  Shayan Taveras MD as Consulting Physician (General Surgery)  Yimi Morales Jr., MD as Consulting Physician (Urology)    Outpatient Medications Prior to Visit   Medication Sig Dispense Refill   • amLODIPine (NORVASC) 5 MG tablet TAKE 1 TABLET BY MOUTH DAILY 90 tablet 3   • Calcium-Magnesium-Vitamin D (CALCIUM 1200+D3 PO) Take  by mouth.     • Loratadine 10 MG capsule Take 1 tablet by mouth daily.     • metoprolol tartrate (LOPRESSOR) 50 MG tablet TAKE 1&1/2 TABLETS BY MOUTH TWICE DAILY 270 tablet 3   • omeprazole (priLOSEC) 40 MG capsule TAKE 1 CAPSULE BY MOUTH TWICE DAILY BEFORE MEALS 60 capsule 2   • tamsulosin (FLOMAX) 0.4 MG capsule 24 hr capsule Take 0.4 mg by mouth 2 (Two) Times a Day.  1     No facility-administered medications prior to visit.       No opioid medication identified on active medication list. I have reviewed chart for other potential  high risk medication/s and harmful drug interactions in the elderly.          Aspirin is not on active medication list.  Aspirin use is not indicated based on review of current medical condition/s. Risk of harm outweighs  "potential benefits.  .    Patient Active Problem List   Diagnosis   • Gastroesophageal reflux disease   • Syndrome of inappropriate secretion of antidiuretic hormone (HCC)   • Ventricular premature beats   • Vocal cord dysfunction   • Chronic venous insufficiency   • Chronic hyponatremia   • Benign prostatic hyperplasia with nocturia   • Allergic rhinitis   • Thoracogenic scoliosis   • Calcium oxalate renal stones   • Cancer (HCC)   • Chest pain   • Diastolic dysfunction   • Environmental allergies   • Hypertension   • Osteoarthritis of lumbar spine   • History of prostate cancer   • Screening for colon cancer     Advance Care Planning  Advance Directive is not on file.  ACP discussion was held with the patient during this visit. Patient does not have an advance directive, information provided.    Review of Systems   Gastrointestinal:        Continues with severe acid reflux symptoms despite Prilosec twice daily.  Has had EGDs and previous evaluation by gastroenterologist   Genitourinary:        Known history of prostate cancer followed closely by urologist   All other systems reviewed and are negative.       Objective    Vitals:    10/12/21 0851 10/12/21 1021   BP: 130/74    Pulse: 61    Temp: 97 °F (36.1 °C)    SpO2: 98%    Weight: 73.5 kg (162 lb) 69.4 kg (153 lb)   Height: 167.6 cm (65.98\")      BMI Readings from Last 1 Encounters:   10/12/21 24.71 kg/m²   BMI is above normal parameters. Recommendations include: exercise counseling and nutrition counseling    Does the patient have evidence of cognitive impairment? No    Physical Exam  Vitals and nursing note reviewed.   Constitutional:       General: He is not in acute distress.     Appearance: He is well-developed.   HENT:      Head: Normocephalic and atraumatic.      Nose:      Comments: Patient with mask.  Provider with mask and shield  Eyes:      Extraocular Movements: Extraocular movements intact.      Conjunctiva/sclera: Conjunctivae normal.      Pupils: " Pupils are equal, round, and reactive to light.   Neck:      Thyroid: No thyromegaly.   Cardiovascular:      Rate and Rhythm: Normal rate and regular rhythm.      Heart sounds: Normal heart sounds.   Pulmonary:      Effort: Pulmonary effort is normal. No respiratory distress.      Breath sounds: Normal breath sounds.   Abdominal:      General: There is no distension.      Palpations: Abdomen is soft. There is no mass.      Tenderness: There is no abdominal tenderness.      Hernia: No hernia is present.   Musculoskeletal:         General: No tenderness or deformity. Normal range of motion.      Cervical back: Normal range of motion.   Lymphadenopathy:      Cervical: No cervical adenopathy.   Skin:     General: Skin is warm and dry.      Coloration: Skin is not pale.      Findings: No rash.   Neurological:      General: No focal deficit present.      Mental Status: He is alert and oriented to person, place, and time.      Motor: No abnormal muscle tone.      Coordination: Coordination normal.   Psychiatric:         Mood and Affect: Mood normal.         Behavior: Behavior normal.         Thought Content: Thought content normal.         Judgment: Judgment normal.                 HEALTH RISK ASSESSMENT    Smoking Status:  Social History     Tobacco Use   Smoking Status Never Smoker   Smokeless Tobacco Never Used     Alcohol Consumption:  Social History     Substance and Sexual Activity   Alcohol Use Yes   • Alcohol/week: 1.0 standard drink   • Types: 1 Cans of beer per week    Comment: seldom, socially     Fall Risk Screen:    [facility] Fall Risk Assessment was completed, and patient is at LOW risk for falls.Assessment completed on:4/15/2021    Depression Screening:  PHQ-2/PHQ-9 Depression Screening 4/15/2021   Little interest or pleasure in doing things 0   Feeling down, depressed, or hopeless 0   Trouble falling or staying asleep, or sleeping too much -   Feeling tired or having little energy -   Poor appetite or  overeating -   Feeling bad about yourself - or that you are a failure or have let yourself or your family down -   Trouble concentrating on things, such as reading the newspaper or watching television -   Moving or speaking so slowly that other people could have noticed. Or the opposite - being so fidgety or restless that you have been moving around a lot more than usual -   Thoughts that you would be better off dead, or of hurting yourself in some way -   Total Score 0   If you checked off any problems, how difficult have these problems made it for you to do your work, take care of things at home, or get along with other people? -       Health Habits and Functional and Cognitive Screening:  Functional & Cognitive Status 10/12/2021   Do you have difficulty preparing food and eating? No   Do you have difficulty bathing yourself, getting dressed or grooming yourself? No   Do you have difficulty using the toilet? No   Do you have difficulty moving around from place to place? No   Do you have trouble with steps or getting out of a bed or a chair? No   Current Diet -   Dental Exam -   Eye Exam -   Exercise (times per week) -   Current Exercise Activities Include -   Do you need help using the phone?  No   Are you deaf or do you have serious difficulty hearing?  No   Do you need help with transportation? No   Do you need help shopping? No   Do you need help preparing meals?  No   Do you need help with housework?  No   Do you need help with laundry? No   Do you need help taking your medications? No   Do you need help managing money? No   Do you ever drive or ride in a car without wearing a seat belt? No   Have you felt unusual stress, anger or loneliness in the last month? No   Who do you live with? Alone   If you need help, do you have trouble finding someone available to you? No   Have you been bothered in the last four weeks by sexual problems? No   Do you have difficulty concentrating, remembering or making decisions?  No       Age-appropriate Screening Schedule:  Refer to the list below for future screening recommendations based on patient's age, sex and/or medical conditions. Orders for these recommended tests are listed in the plan section. The patient has been provided with a written plan.    Health Maintenance   Topic Date Due   • TDAP/TD VACCINES (1 - Tdap) Never done   • ZOSTER VACCINE (1 of 2) Never done   • INFLUENZA VACCINE  Completed   • LIPID PANEL  Discontinued              Assessment/Plan   CMS Preventative Services Quick Reference  Risk Factors Identified During Encounter    The above risks/problems have been discussed with the patient.  Follow up actions/plans if indicated are seen below in the Assessment/Plan Section.  Pertinent information has been shared with the patient in the After Visit Summary.    Diagnoses and all orders for this visit:    1. Medicare annual wellness visit, subsequent (Primary)    2. Primary hypertension  -     Basic Metabolic Panel    3. Hyponatremia    4. Gastroesophageal reflux disease without esophagitis  -     CBC & Differential    5. Chronic hyponatremia  -     Basic Metabolic Panel    6. History of prostate cancer    7. High risk medication use  -     CBC & Differential        Follow Up:   Patient here for annual Medicare wellness evaluation.  Main complaint is continued acid reflux symptoms especially during the night.  Remains on omeprazole twice daily and discussed considering a different PPI.  Will adjust timing of medication for now and could consider medication such as Pepcid has lost some weight but has been watching diet and exercising etc.  Also discussed immunization updates.  Despite epic patient reports has had 2 pneumonia vaccines in the past but told to check with pharmacy regarding updates?  Otherwise recheck in 6 months or as needed.    An After Visit Summary and PPPS were made available to the patient.

## 2021-10-13 LAB
BASOPHILS # BLD AUTO: 0.02 10*3/MM3 (ref 0–0.2)
BASOPHILS NFR BLD AUTO: 0.3 % (ref 0–1.5)
BUN SERPL-MCNC: 7 MG/DL (ref 8–23)
BUN/CREAT SERPL: 11.1 (ref 7–25)
CALCIUM SERPL-MCNC: 9.8 MG/DL (ref 8.6–10.5)
CHLORIDE SERPL-SCNC: 87 MMOL/L (ref 98–107)
CO2 SERPL-SCNC: 29.6 MMOL/L (ref 22–29)
CREAT SERPL-MCNC: 0.63 MG/DL (ref 0.76–1.27)
EOSINOPHIL # BLD AUTO: 0.08 10*3/MM3 (ref 0–0.4)
EOSINOPHIL NFR BLD AUTO: 1.2 % (ref 0.3–6.2)
ERYTHROCYTE [DISTWIDTH] IN BLOOD BY AUTOMATED COUNT: 12.1 % (ref 12.3–15.4)
GLUCOSE SERPL-MCNC: 100 MG/DL (ref 65–99)
HCT VFR BLD AUTO: 44.4 % (ref 37.5–51)
HGB BLD-MCNC: 14.4 G/DL (ref 13–17.7)
IMM GRANULOCYTES # BLD AUTO: 0.03 10*3/MM3 (ref 0–0.05)
IMM GRANULOCYTES NFR BLD AUTO: 0.5 % (ref 0–0.5)
LYMPHOCYTES # BLD AUTO: 0.58 10*3/MM3 (ref 0.7–3.1)
LYMPHOCYTES NFR BLD AUTO: 8.8 % (ref 19.6–45.3)
MCH RBC QN AUTO: 31.9 PG (ref 26.6–33)
MCHC RBC AUTO-ENTMCNC: 32.4 G/DL (ref 31.5–35.7)
MCV RBC AUTO: 98.2 FL (ref 79–97)
MONOCYTES # BLD AUTO: 0.67 10*3/MM3 (ref 0.1–0.9)
MONOCYTES NFR BLD AUTO: 10.2 % (ref 5–12)
NEUTROPHILS # BLD AUTO: 5.2 10*3/MM3 (ref 1.7–7)
NEUTROPHILS NFR BLD AUTO: 79 % (ref 42.7–76)
NRBC BLD AUTO-RTO: 0 /100 WBC (ref 0–0.2)
PLATELET # BLD AUTO: 221 10*3/MM3 (ref 140–450)
POTASSIUM SERPL-SCNC: 5.3 MMOL/L (ref 3.5–5.2)
RBC # BLD AUTO: 4.52 10*6/MM3 (ref 4.14–5.8)
SODIUM SERPL-SCNC: 124 MMOL/L (ref 136–145)
WBC # BLD AUTO: 6.58 10*3/MM3 (ref 3.4–10.8)

## 2021-10-19 DIAGNOSIS — K21.9 LPRD (LARYNGOPHARYNGEAL REFLUX DISEASE): ICD-10-CM

## 2021-10-19 RX ORDER — OMEPRAZOLE 40 MG/1
CAPSULE, DELAYED RELEASE ORAL
Qty: 60 CAPSULE | Refills: 2 | Status: SHIPPED | OUTPATIENT
Start: 2021-10-19 | End: 2022-01-11

## 2021-11-14 DIAGNOSIS — I10 BENIGN ESSENTIAL HTN: ICD-10-CM

## 2021-11-15 RX ORDER — METOPROLOL TARTRATE 50 MG/1
TABLET, FILM COATED ORAL
Qty: 270 TABLET | Refills: 3 | Status: SHIPPED | OUTPATIENT
Start: 2021-11-15 | End: 2022-11-18 | Stop reason: SDUPTHER

## 2022-01-11 DIAGNOSIS — K21.9 LPRD (LARYNGOPHARYNGEAL REFLUX DISEASE): ICD-10-CM

## 2022-01-11 RX ORDER — OMEPRAZOLE 40 MG/1
CAPSULE, DELAYED RELEASE ORAL
Qty: 60 CAPSULE | Refills: 2 | Status: SHIPPED | OUTPATIENT
Start: 2022-01-11 | End: 2022-04-11

## 2022-04-07 ENCOUNTER — TELEPHONE (OUTPATIENT)
Dept: FAMILY MEDICINE CLINIC | Facility: CLINIC | Age: 72
End: 2022-04-07

## 2022-04-07 NOTE — TELEPHONE ENCOUNTER
PT NEEDS A LETTER STATING PT TO BE EXCUSED FROM [employer] DUE TO STRESS AND ANXIETY/LPRD AND HE IS 71,  CALL [phone] WHEN READY.

## 2022-04-11 DIAGNOSIS — K21.9 LPRD (LARYNGOPHARYNGEAL REFLUX DISEASE): ICD-10-CM

## 2022-04-11 RX ORDER — OMEPRAZOLE 40 MG/1
CAPSULE, DELAYED RELEASE ORAL
Qty: 60 CAPSULE | Refills: 2 | Status: SHIPPED | OUTPATIENT
Start: 2022-04-11 | End: 2022-07-21 | Stop reason: SDUPTHER

## 2022-04-12 ENCOUNTER — OFFICE VISIT (OUTPATIENT)
Dept: FAMILY MEDICINE CLINIC | Facility: CLINIC | Age: 72
End: 2022-04-12

## 2022-04-12 VITALS
HEIGHT: 66 IN | RESPIRATION RATE: 20 BRPM | WEIGHT: 153.4 LBS | TEMPERATURE: 96.9 F | DIASTOLIC BLOOD PRESSURE: 80 MMHG | OXYGEN SATURATION: 99 % | SYSTOLIC BLOOD PRESSURE: 110 MMHG | BODY MASS INDEX: 24.65 KG/M2 | HEART RATE: 55 BPM

## 2022-04-12 DIAGNOSIS — I10 PRIMARY HYPERTENSION: Primary | ICD-10-CM

## 2022-04-12 DIAGNOSIS — Z79.899 HIGH RISK MEDICATION USE: ICD-10-CM

## 2022-04-12 DIAGNOSIS — Z85.46 HISTORY OF PROSTATE CANCER: ICD-10-CM

## 2022-04-12 DIAGNOSIS — M41.30 THORACOGENIC SCOLIOSIS, UNSPECIFIED SPINAL REGION: ICD-10-CM

## 2022-04-12 DIAGNOSIS — E87.1 CHRONIC HYPONATREMIA: ICD-10-CM

## 2022-04-12 DIAGNOSIS — E22.2 SYNDROME OF INAPPROPRIATE SECRETION OF ANTIDIURETIC HORMONE: ICD-10-CM

## 2022-04-12 PROCEDURE — 99213 OFFICE O/P EST LOW 20 MIN: CPT | Performed by: FAMILY MEDICINE

## 2022-04-12 NOTE — PROGRESS NOTES
HPI  Yeyo Abebe is a 71 y.o. male who is here for follow up hypertension as well as hyponatremia and other issues.  Patient concerned about skin lesions and missed appointment with dermatologist.  Had recommended annual skin evaluations but patient discontinued because of pandemic issues etc.  Has a lesion on his forehead as well as right leg which he is concerned about.  These do not appear as skin cancers but he says they persist and bleeding intermittently.  Recommend contacting dermatologist for annual skin evaluation.  Also has a keloid on his chest from a previous scar which seems to be enlarging and may need cortisone injection or other therapy?  Also complaining of foot pain and ask about going to the [facility].  Discussed may need podiatry referral if would like to get insurance coverage for inserts etc.  He will decide about this and contact the office if necessary.  Of note patient has ongoing issues with hyponatremia and will be rechecked.  Also did have radiation therapy for prostate cancer and is followed closely by urologist.  PSA levels have been steadily decreasing.      Review of Systems   Musculoskeletal: Positive for gait problem.        Problems with feet   All other systems reviewed and are negative.        Past Medical History:   Diagnosis Date   • GERD (gastroesophageal reflux disease)    • Hypertension    • Personal history of kidney stones    • Prostate CA (HCC)        Past Surgical History:   Procedure Laterality Date   • ABDOMINAL HERNIA REPAIR N/A 2008   • BACK SURGERY N/A 1958    Dr. Hurt   • COLONOSCOPY N/A 2010    [facility]   • COLONOSCOPY N/A 12/11/2019    Procedure: COLONOSCOPY TO CECUM;  Surgeon: Shayan Taveras MD;  Location: [facility] ENDOSCOPY;  Service: General   • ENDOSCOPY N/A 5/25/2016    Procedure: ESOPHAGOGASTRODUODENOSCOPY WITH ANESTHESIA;  Surgeon: Shayan Taveras MD;  Location: [facility] ENDOSCOPY;  Service:    • ENDOSCOPY  12/11/2019    Procedure:  ESOPHAGOGASTRODUODENOSCOPY WITH COLD BIOPSIES, CLIP PLACEMENT X1;  Surgeon: Shayan Taveras MD;  Location: [facility] ENDOSCOPY;  Service: General   • PROSTATE RADIOACTIVE SEED IMPLANT     • SPINAL FUSION      child       No family history on file.    Social History     Socioeconomic History   • Marital status:    Tobacco Use   • Smoking status: Never Smoker   • Smokeless tobacco: Never Used   Substance and Sexual Activity   • Alcohol use: Yes     Alcohol/week: 1.0 standard drink     Types: 1 Cans of beer per week     Comment: seldom, socially   • Drug use: No   • Sexual activity: Defer       Vitals:    04/12/22 0944   BP: 110/80   Pulse: 55   Resp: 20   Temp: 96.9 °F (36.1 °C)   SpO2: 99%        Body mass index is 24.77 kg/m².      Physical Exam  Vitals and nursing note reviewed.   Constitutional:       General: He is not in acute distress.     Appearance: He is well-developed.   HENT:      Head: Normocephalic and atraumatic.      Nose:      Comments: Patient with mask.  Provider with mask and shield  Eyes:      Conjunctiva/sclera: Conjunctivae normal.      Pupils: Pupils are equal, round, and reactive to light.   Neck:      Thyroid: No thyromegaly.   Cardiovascular:      Rate and Rhythm: Normal rate and regular rhythm.      Heart sounds: Normal heart sounds.   Pulmonary:      Effort: Pulmonary effort is normal. No respiratory distress.      Breath sounds: Normal breath sounds.   Abdominal:      General: There is no distension.      Palpations: Abdomen is soft. There is no mass.      Tenderness: There is no abdominal tenderness.      Hernia: No hernia is present.   Musculoskeletal:         General: No tenderness or deformity. Normal range of motion.      Cervical back: Normal range of motion.   Lymphadenopathy:      Cervical: No cervical adenopathy.   Skin:     General: Skin is warm and dry.      Coloration: Skin is not pale.      Findings: No rash.          Neurological:      Mental Status: He is alert  and oriented to person, place, and time.      Motor: No abnormal muscle tone.      Coordination: Coordination normal.   Psychiatric:         Behavior: Behavior normal.         Thought Content: Thought content normal.         Judgment: Judgment normal.           Assessment/Plan    Diagnoses and all orders for this visit:    1. Primary hypertension (Primary)  -     Comprehensive Metabolic Panel    2. Syndrome of inappropriate secretion of antidiuretic hormone (HCC)    3. Chronic hyponatremia  -     Comprehensive Metabolic Panel    4. History of prostate cancer  -     CBC & Differential  -     Comprehensive Metabolic Panel    5. Thoracogenic scoliosis, unspecified spinal region    6. High risk medication use  -     CBC & Differential  -     Comprehensive Metabolic Panel      Patient here concerned about several skin lesions and recommend contacting dermatologist for follow-up screening.  Also foot discomfort and discussed going to the [facility] etc.  Is followed closely by urologist after radiation therapy for prostate cancer.  Has arranged for appointment with new primary care physician in October.  Recommend contacting their office and may want to move appointment back a few days so can get annual Medicare wellness evaluation.

## 2022-04-13 LAB
ALBUMIN SERPL-MCNC: 4.3 G/DL (ref 3.7–4.7)
ALBUMIN/GLOB SERPL: 1.7 {RATIO} (ref 1.2–2.2)
ALP SERPL-CCNC: 50 IU/L (ref 44–121)
ALT SERPL-CCNC: 15 IU/L (ref 0–44)
AST SERPL-CCNC: 19 IU/L (ref 0–40)
BASOPHILS # BLD AUTO: 0 X10E3/UL (ref 0–0.2)
BASOPHILS NFR BLD AUTO: 0 %
BILIRUB SERPL-MCNC: 0.7 MG/DL (ref 0–1.2)
BUN SERPL-MCNC: 6 MG/DL (ref 8–27)
BUN/CREAT SERPL: 10 (ref 10–24)
CALCIUM SERPL-MCNC: 9.4 MG/DL (ref 8.6–10.2)
CHLORIDE SERPL-SCNC: 85 MMOL/L (ref 96–106)
CO2 SERPL-SCNC: 26 MMOL/L (ref 20–29)
CREAT SERPL-MCNC: 0.63 MG/DL (ref 0.76–1.27)
EGFRCR SERPLBLD CKD-EPI 2021: 101 ML/MIN/1.73
EOSINOPHIL # BLD AUTO: 0.1 X10E3/UL (ref 0–0.4)
EOSINOPHIL NFR BLD AUTO: 2 %
ERYTHROCYTE [DISTWIDTH] IN BLOOD BY AUTOMATED COUNT: 11.7 % (ref 11.6–15.4)
GLOBULIN SER CALC-MCNC: 2.5 G/DL (ref 1.5–4.5)
GLUCOSE SERPL-MCNC: 86 MG/DL (ref 65–99)
HCT VFR BLD AUTO: 41.1 % (ref 37.5–51)
HGB BLD-MCNC: 13.9 G/DL (ref 13–17.7)
IMM GRANULOCYTES # BLD AUTO: 0 X10E3/UL (ref 0–0.1)
IMM GRANULOCYTES NFR BLD AUTO: 1 %
LYMPHOCYTES # BLD AUTO: 0.5 X10E3/UL (ref 0.7–3.1)
LYMPHOCYTES NFR BLD AUTO: 10 %
MCH RBC QN AUTO: 32.2 PG (ref 26.6–33)
MCHC RBC AUTO-ENTMCNC: 33.8 G/DL (ref 31.5–35.7)
MCV RBC AUTO: 95 FL (ref 79–97)
MONOCYTES # BLD AUTO: 0.6 X10E3/UL (ref 0.1–0.9)
MONOCYTES NFR BLD AUTO: 11 %
NEUTROPHILS # BLD AUTO: 4 X10E3/UL (ref 1.4–7)
NEUTROPHILS NFR BLD AUTO: 76 %
PLATELET # BLD AUTO: 213 X10E3/UL (ref 150–450)
POTASSIUM SERPL-SCNC: 4.8 MMOL/L (ref 3.5–5.2)
PROT SERPL-MCNC: 6.8 G/DL (ref 6–8.5)
RBC # BLD AUTO: 4.32 X10E6/UL (ref 4.14–5.8)
SODIUM SERPL-SCNC: 124 MMOL/L (ref 134–144)
WBC # BLD AUTO: 5.3 X10E3/UL (ref 3.4–10.8)

## 2022-07-21 DIAGNOSIS — K21.9 LPRD (LARYNGOPHARYNGEAL REFLUX DISEASE): ICD-10-CM

## 2022-07-21 RX ORDER — OMEPRAZOLE 40 MG/1
40 CAPSULE, DELAYED RELEASE ORAL
Qty: 60 CAPSULE | Refills: 2 | Status: SHIPPED | OUTPATIENT
Start: 2022-07-21 | End: 2022-07-22 | Stop reason: SDUPTHER

## 2022-07-21 NOTE — TELEPHONE ENCOUNTER
Caller: Yeyo Abebe    Relationship: Self    Best call back number:  [phone] (H)    Requested Prescriptions:   Requested Prescriptions     Pending Prescriptions Disp Refills   • omeprazole (priLOSEC) 40 MG capsule 60 capsule 2     Sig: Take 1 capsule by mouth 2 (Two) Times a Day Before Meals.        Pharmacy where request should be sent: [id number] [address] [place] AT [facility] &  - [phone] [facility] [phone] [employer]     Additional details provided by patient:     HAS NEW PATIENT APPT IN October WITH DR BOBO BUT OUT OF MEDS NOW         Does the patient have less than a 3 day supply:  [x] Yes  [] No    Alaina Thompson   07/21/22 09:08 EDT

## 2022-07-22 DIAGNOSIS — K21.9 LPRD (LARYNGOPHARYNGEAL REFLUX DISEASE): ICD-10-CM

## 2022-07-22 RX ORDER — OMEPRAZOLE 40 MG/1
40 CAPSULE, DELAYED RELEASE ORAL
Qty: 60 CAPSULE | Refills: 2 | Status: SHIPPED | OUTPATIENT
Start: 2022-07-22 | End: 2022-10-20 | Stop reason: SDUPTHER

## 2022-08-26 ENCOUNTER — TELEPHONE (OUTPATIENT)
Dept: INTERNAL MEDICINE | Facility: CLINIC | Age: 72
End: 2022-08-26

## 2022-08-26 DIAGNOSIS — I10 BENIGN ESSENTIAL HTN: ICD-10-CM

## 2022-08-26 RX ORDER — AMLODIPINE BESYLATE 5 MG/1
5 TABLET ORAL DAILY
Qty: 90 TABLET | Refills: 3 | Status: SHIPPED | OUTPATIENT
Start: 2022-08-26

## 2022-08-26 NOTE — TELEPHONE ENCOUNTER
Caller: Yeyo Abebe    Relationship to patient: Self    Best call back number: [phone]    Chief complaint: OFFBOARDING DR. RAY, HOSPITAL FOLLOW UP    Type of visit: NEW PATIENT    Requested date: AROUND 9/7/22    Additional notes: PATIENT HAS HAD SOME RECENT HEALTH ISSUES AND WAS HOSPITALIZED IN [place] WHERE HIS SON LIVES. HE WAS SUPPOSED TO SEE DR. BOBO 8/19/22 BUT DUE TO HIS HOSPITALIZATION HE HAD TO CANCEL BECAUSE HE STAYED IN [place] WITH HIS SON AFTER BEING DISCHARGED. HE IS EXPECTING TO BE BACK IN [facility] LABOR DAY WEEKEND AND WOULD LIKE TO BE SEEN ON OR AROUND 9/7/22. NEXT AVAILABLE IN Epic IS IN December AND HE CANNOT WAIT THAT LONG.    OKAY TO RESPOND ON [employer] IF PATIENT DOESN'T ANSWER THE PHONE FIRST, PATIENT DOESN'T HAVE GOOD SERVICE IN [place].    PLEASE ADVISE

## 2022-08-26 NOTE — TELEPHONE ENCOUNTER
Caller: Yeyo Abebe     Relationship to patient: Self     Best call back number: [phone]     Chief complaint: OFFBOARDING DR. RAY, HOSPITAL FOLLOW UP     Type of visit: NEW PATIENT     Requested date: AROUND 9/7/22     Additional notes: PATIENT HAS HAD SOME RECENT HEALTH ISSUES AND WAS HOSPITALIZED IN [place] WHERE HIS SON LIVES. HE WAS SUPPOSED TO SEE DR. BOBO 8/19/22 BUT DUE TO HIS HOSPITALIZATION HE HAD TO CANCEL BECAUSE HE STAYED IN [place] WITH HIS SON AFTER BEING DISCHARGED. HE IS EXPECTING TO BE BACK IN [facility] LABOR DAY WEEKEND AND WOULD LIKE TO BE SEEN ON OR AROUND 9/7/22. NEXT AVAILABLE IN Epic IS IN December AND HE CANNOT WAIT THAT LONG.     OKAY TO RESPOND ON [employer] IF PATIENT DOESN'T ANSWER THE PHONE FIRST, PATIENT DOESN'T HAVE GOOD SERVICE IN [place].     PLEASE ADVISE

## 2022-09-12 ENCOUNTER — OFFICE VISIT (OUTPATIENT)
Dept: INTERNAL MEDICINE | Facility: CLINIC | Age: 72
End: 2022-09-12

## 2022-09-12 VITALS
SYSTOLIC BLOOD PRESSURE: 120 MMHG | HEART RATE: 61 BPM | TEMPERATURE: 96.9 F | OXYGEN SATURATION: 99 % | HEIGHT: 66 IN | WEIGHT: 142 LBS | DIASTOLIC BLOOD PRESSURE: 80 MMHG | BODY MASS INDEX: 22.82 KG/M2

## 2022-09-12 DIAGNOSIS — E22.2 SIADH (SYNDROME OF INAPPROPRIATE ADH PRODUCTION): Primary | ICD-10-CM

## 2022-09-12 DIAGNOSIS — R56.9 SEIZURE: ICD-10-CM

## 2022-09-12 DIAGNOSIS — E87.1 HYPONATREMIA: ICD-10-CM

## 2022-09-12 DIAGNOSIS — I10 ESSENTIAL HYPERTENSION: ICD-10-CM

## 2022-09-12 PROCEDURE — 99214 OFFICE O/P EST MOD 30 MIN: CPT | Performed by: STUDENT IN AN ORGANIZED HEALTH CARE EDUCATION/TRAINING PROGRAM

## 2022-09-12 RX ORDER — LEVETIRACETAM 500 MG/1
500 TABLET ORAL 2 TIMES DAILY
COMMUNITY
Start: 2022-08-11 | End: 2023-01-20

## 2022-09-12 RX ORDER — SODIUM CHLORIDE 1000 MG
1 TABLET, SOLUBLE MISCELLANEOUS 3 TIMES DAILY
COMMUNITY
End: 2022-09-12

## 2022-09-12 RX ORDER — FUROSEMIDE 20 MG/1
20 TABLET ORAL DAILY
COMMUNITY

## 2022-09-12 RX ORDER — SODIUM CHLORIDE 1000 MG
TABLET, SOLUBLE MISCELLANEOUS
COMMUNITY
Start: 2022-09-02

## 2022-09-12 RX ORDER — LEVETIRACETAM 500 MG/1
500 TABLET ORAL 2 TIMES DAILY
COMMUNITY
End: 2022-09-12 | Stop reason: SDUPTHER

## 2022-09-12 NOTE — PROGRESS NOTES
"  Bird Hernandez D.O.  Internal Medicine  [facility]  [address], [address]  [place] [address]  [phone]      Chief Complaint  former Dr. Monte patient    SUBJECTIVE    History of Present Illness    Yeyo Abebe is a 72 y.o. male who presents to the office today as a new patient to establish care.    Patient is transferring care from Dr Dobbins who retired.     HTN: takes amlodipine 5 mg daily and metoprolol tartrate 75 mg twice daily. Has history of irregular heart beat with caffeine \"It doesn't do it very often\" and was always told it was the kind to not worry about.    Laryngopharyngeal reflux: takes omeprazole 40 mg twice daily    BPH: diagnosed with prostate cancer 2018 s/p radiation, takes tamsulosin 0.4 mg and furosemide 10 mg daily. Follows with [facility].     Seasonal allergies: takes loratadine, states he is allergic to grass and weeds.     Hyponatremia : States his sodium runs in the upper 120s. He reports being hospitalized in August 2022 for severe hyponatremia.  Per progress note from outside hopsital \". history of scoliosis, vocal cord dysfunction, HTN, SIADH, chronic hyponatremia, prostate cancer treated with radiation, and GERD on PPI, who presents today via video visit for follow up on hospitalization. Her presented to the ED on 8/5/2022 with complaints of generalized weakness/fatigue that has been progressively worsening over the past one to two weeks. In the ED patient was noted to have severe hyponatremia with sodium of 105.  , ALK phos 567, CR/BUN 0.39/6, no leukocytosis. Found to have COVID 19 infection. CXR negative, CT head negative. Patient was provider hypertonic saline infusion then transitioned to sodium tabs. He was seen by neuro and started on Keppra due to two seizures in the hospital. Of note acute cholecystitis was seen on CT. General surgery noted this was not an active infection. His sodium stabilized and he was discharged.\" "       He is taking Keppra 500 mg twice daily and sodium chloride 1.5 g three times daily and is set to change to 1 g three times daily in 3 days.       Allergies   Allergen Reactions   • Aspirin-Caffeine Other (See Comments)     SWEATING     • Codeine Anxiety     ALSO SWEATING AND ELEVATED BODY TEMPERATURE        Outpatient Medications Marked as Taking for the 9/12/22 encounter (Office Visit) with Bird Hernandez, DO   Medication Sig Dispense Refill   • amLODIPine (NORVASC) 5 MG tablet Take 1 tablet by mouth Daily. 90 tablet 3   • Calcium Carbonate-Vitamin D (calcium-vitamin D) 500-200 MG-UNIT tablet per tablet Take 1 tablet by mouth 2 (Two) Times a Day With Meals.     • furosemide (LASIX) 20 MG tablet Take 10 mg by mouth Daily.     • levETIRAcetam (KEPPRA) 500 MG tablet Take 500 mg by mouth 2 (Two) Times a Day.     • Loratadine 10 MG capsule Take 1 tablet by mouth daily.     • metoprolol tartrate (LOPRESSOR) 50 MG tablet TAKE 1 AND 1/2 TABLETS BY MOUTH TWICE DAILY 270 tablet 3   • omeprazole (priLOSEC) 40 MG capsule Take 1 capsule by mouth 2 (Two) Times a Day Before Meals. 60 capsule 2   • sodium chloride 1 g tablet TAKE 1 AND 1/2 TABLETS(1.5 GRAMS) BY MOUTH THREE TIMES DAILY WITH MEALS FOR 7 DAYS THEN TAKE 1 TABLET(1 GRAM) BY MOUTH THREE TIMES DAILY FOR 21     • tamsulosin (FLOMAX) 0.4 MG capsule 24 hr capsule Take 0.4 mg by mouth Daily.  1   • [DISCONTINUED] Calcium-Magnesium-Vitamin D (CALCIUM 1200+D3 PO) Take  by mouth.     • [DISCONTINUED] levETIRAcetam (KEPPRA) 500 MG tablet Take 500 mg by mouth 2 (Two) Times a Day.          Past Medical History:   Diagnosis Date   • Allergies    • BPH (benign prostatic hyperplasia)    • GERD (gastroesophageal reflux disease)    • Hypertension    • Hyponatremia    • Personal history of kidney stones    • Polio     1950s   • Prostate CA ([facility])    • Scoliosis    • SIADH (syndrome of inappropriate ADH production) ([facility])        OBJECTIVE    Vital Signs:   /80   Pulse 61   Temp  "96.9 °F (36.1 °C) (Infrared)   Ht 167.6 cm (65.98\")   Wt 64.4 kg (142 lb)   SpO2 99%   BMI 22.93 kg/m²     Physical Exam  Vitals reviewed.   Constitutional:       General: He is not in acute distress.     Appearance: Normal appearance. He is normal weight. He is not ill-appearing.   HENT:      Head: Atraumatic.   Eyes:      General: No scleral icterus.  Cardiovascular:      Rate and Rhythm: Normal rate and regular rhythm.      Heart sounds: Normal heart sounds. No murmur heard.  Pulmonary:      Effort: Pulmonary effort is normal. No respiratory distress.      Breath sounds: Normal breath sounds. No wheezing.   Musculoskeletal:      Right lower leg: Edema (trace ankle) present.      Left lower leg: Edema (1-2+ ankle) present.      Comments: +scoiliosis    Skin:     Coloration: Skin is not jaundiced.   Neurological:      Mental Status: He is alert.   Psychiatric:         Mood and Affect: Mood normal.         Behavior: Behavior normal.         Thought Content: Thought content normal.                             ASSESSMENT & PLAN     Diagnoses and all orders for this visit:    1. SIADH (syndrome of inappropriate ADH production) (HCC) (Primary)  2. Hyponatremia  3. Seizure (HCC)  -pt with history of SIADH per chart review with chronic hyponatremia in the upper 120s/low 130s   -he was hospitalized in [place] August 2022 for severe hyponatremia, COVID 19 infection, multiple seizures; I reviewed MRI brain, CT head, CT abdomen/pelvis, EEG report from that hospital stay   -he is now taking  Keppra 500 mg twice daily and sodium chloride 1.5 g three times daily and is set to change to 1 g three times daily in 3 days  -last Na level was 140 August 30 2022   -also had transaminitis during that hospital stay that resolved  -will refer to neurology for eval of seizures per discharge note recommendations but they seem to have been metabolic in etiology  -will refer to nephrology for eval of SIADH/Hyponatremia and for further " recommendations regarding sodium supplementation   -will check BMP today and bring him back next week for another recheck after he decreases his sodium tabs down to 1 g three times daily   -     Ambulatory Referral to Neurology  -     Ambulatory Referral to Nephrology  -     Basic metabolic panel    4. Essential hypertension  -takes amlodipine 5 mg daily and metoprolol tartrate 75 mg twice daily.  -BP under great control at 120/80 today, continue current regimen      I spent 43 minutes caring for Yeyo on this date of service. This time includes time spent by me in the following activities:preparing for the visit, reviewing tests, performing a medically appropriate examination and/or evaluation , counseling and educating the patient/family/caregiver, ordering medications, tests, or procedures and documenting information in the medical record    The following social determinates of health impact the patient's medical decision making: No social determinates of health were factored in to today's visit.     Follow Up  Return in about 1 month (around 10/12/2022) for Medicare Wellness.    Patient/family had no further questions at this time and verbalized understanding of the plan discussed today.

## 2022-09-13 LAB
BUN SERPL-MCNC: 8 MG/DL (ref 8–23)
BUN/CREAT SERPL: 11.1 (ref 7–25)
CALCIUM SERPL-MCNC: 9.7 MG/DL (ref 8.6–10.5)
CHLORIDE SERPL-SCNC: 96 MMOL/L (ref 98–107)
CO2 SERPL-SCNC: 31.4 MMOL/L (ref 22–29)
CREAT SERPL-MCNC: 0.72 MG/DL (ref 0.76–1.27)
EGFRCR-CYS SERPLBLD CKD-EPI 2021: 96.5 ML/MIN/1.73
GLUCOSE SERPL-MCNC: 75 MG/DL (ref 65–99)
POTASSIUM SERPL-SCNC: 5.4 MMOL/L (ref 3.5–5.2)
SODIUM SERPL-SCNC: 134 MMOL/L (ref 136–145)

## 2022-09-19 DIAGNOSIS — E87.1: Primary | ICD-10-CM

## 2022-09-19 LAB — URATE SERPL-MCNC: 3.5 MG/DL (ref 3.4–7)

## 2022-09-22 ENCOUNTER — TELEPHONE (OUTPATIENT)
Dept: INTERNAL MEDICINE | Facility: CLINIC | Age: 72
End: 2022-09-22

## 2022-09-22 NOTE — TELEPHONE ENCOUNTER
Caller: Yeyo Abebe    Relationship to patient: Self    Best call back number: [phone]    Patient is needing: ASKING ABOUT GETTING SECOND COVID BOOSTER. PATIENT HAD COVID FIRST WEEK OF AUGUST. ASKING WHEN HE CAN GET SECOND COVID BOOSTER (NEW VACCINE THAT JUST CAME OUT)   PLEASE ADVISE--MAY CALL OR SEND MESSAGE ON [employer]

## 2022-10-20 ENCOUNTER — OFFICE VISIT (OUTPATIENT)
Dept: INTERNAL MEDICINE | Facility: CLINIC | Age: 72
End: 2022-10-20

## 2022-10-20 VITALS
OXYGEN SATURATION: 98 % | BODY MASS INDEX: 23.63 KG/M2 | DIASTOLIC BLOOD PRESSURE: 70 MMHG | HEART RATE: 73 BPM | HEIGHT: 66 IN | WEIGHT: 147 LBS | SYSTOLIC BLOOD PRESSURE: 120 MMHG

## 2022-10-20 DIAGNOSIS — K76.9 LIVER LESION: ICD-10-CM

## 2022-10-20 DIAGNOSIS — R56.9 SEIZURE: ICD-10-CM

## 2022-10-20 DIAGNOSIS — I25.10 CORONARY ARTERY CALCIFICATION SEEN ON CT SCAN: ICD-10-CM

## 2022-10-20 DIAGNOSIS — K21.9 LPRD (LARYNGOPHARYNGEAL REFLUX DISEASE): ICD-10-CM

## 2022-10-20 DIAGNOSIS — Z00.00 MEDICARE ANNUAL WELLNESS VISIT, SUBSEQUENT: Primary | ICD-10-CM

## 2022-10-20 PROCEDURE — G0439 PPPS, SUBSEQ VISIT: HCPCS | Performed by: STUDENT IN AN ORGANIZED HEALTH CARE EDUCATION/TRAINING PROGRAM

## 2022-10-20 PROCEDURE — 1170F FXNL STATUS ASSESSED: CPT | Performed by: STUDENT IN AN ORGANIZED HEALTH CARE EDUCATION/TRAINING PROGRAM

## 2022-10-20 PROCEDURE — 99214 OFFICE O/P EST MOD 30 MIN: CPT | Performed by: STUDENT IN AN ORGANIZED HEALTH CARE EDUCATION/TRAINING PROGRAM

## 2022-10-20 PROCEDURE — 1159F MED LIST DOCD IN RCRD: CPT | Performed by: STUDENT IN AN ORGANIZED HEALTH CARE EDUCATION/TRAINING PROGRAM

## 2022-10-20 RX ORDER — OMEPRAZOLE 40 MG/1
40 CAPSULE, DELAYED RELEASE ORAL
Qty: 60 CAPSULE | Refills: 2 | Status: SHIPPED | OUTPATIENT
Start: 2022-10-20 | End: 2023-01-25

## 2022-10-20 RX ORDER — ATORVASTATIN CALCIUM 20 MG/1
20 TABLET, FILM COATED ORAL DAILY
Qty: 90 TABLET | Refills: 1 | Status: SHIPPED | OUTPATIENT
Start: 2022-10-20

## 2022-10-20 NOTE — PROGRESS NOTES
The ABCs of the Annual Wellness Visit  Subsequent Medicare Wellness Visit    Chief Complaint   Patient presents with   • Medicare Wellness-subsequent      Subjective    History of Present Illness:  Yeyo Abebe is a 72 y.o. male who presents for a Subsequent Medicare Wellness Visit.    The following portions of the patient's history were reviewed and   updated as appropriate: allergies, current medications, past family history, past medical history, past social history, past surgical history and problem list.    Compared to one year ago, the patient feels his physical   health is worse. Because of issues dealing with hyponatremia and seizure but he feels pretty much back to normal. States he feels his stamina is not as good after being in the hospital.     Compared to one year ago, the patient feels his mental   health is the same.    Recent Hospitalizations:  He was admitted within the past 365 days       Current Medical Providers:  Patient Care Team:  Bird Hernandez DO as PCP - General (Internal Medicine)  Shayan Taveras MD as Consulting Physician (General Surgery)  Yimi Morales Jr., MD as Consulting Physician (Urology)    Outpatient Medications Prior to Visit   Medication Sig Dispense Refill   • amLODIPine (NORVASC) 5 MG tablet Take 1 tablet by mouth Daily. 90 tablet 3   • Calcium Carbonate-Vitamin D (calcium-vitamin D) 500-200 MG-UNIT tablet per tablet Take 1 tablet by mouth 2 (Two) Times a Day With Meals.     • furosemide (LASIX) 20 MG tablet Take 1 tablet by mouth Daily.     • levETIRAcetam (KEPPRA) 500 MG tablet Take 500 mg by mouth 2 (Two) Times a Day.     • Loratadine 10 MG capsule Take 1 tablet by mouth daily.     • metoprolol tartrate (LOPRESSOR) 50 MG tablet TAKE 1 AND 1/2 TABLETS BY MOUTH TWICE DAILY 270 tablet 3   • omeprazole (priLOSEC) 40 MG capsule Take 1 capsule by mouth 2 (Two) Times a Day Before Meals. 60 capsule 2   • sodium chloride 1 g tablet Takes 1 tablet in the morning and 1  "evening.     • tamsulosin (FLOMAX) 0.4 MG capsule 24 hr capsule Take 0.4 mg by mouth Daily.  1     No facility-administered medications prior to visit.       No opioid medication identified on active medication list. I have reviewed chart for other potential  high risk medication/s and harmful drug interactions in the elderly.          Aspirin is not on active medication list.  Aspirin use is indicated based on review of current medical condition/s. Pros and cons of this therapy have been discussed with this patient. Benefits of this medication outweigh potential harm.  Patient has been instructed to start taking this medication..    Patient Active Problem List   Diagnosis   • Gastroesophageal reflux disease   • Syndrome of inappropriate secretion of antidiuretic hormone (HCC)   • Ventricular premature beats   • Vocal cord dysfunction   • Chronic venous insufficiency   • Chronic hyponatremia   • Benign prostatic hyperplasia with nocturia   • Allergic rhinitis   • Thoracogenic scoliosis   • Calcium oxalate renal stones   • Cancer (HCC)   • Chest pain   • Diastolic dysfunction   • Environmental allergies   • Hypertension   • Osteoarthritis of lumbar spine   • History of prostate cancer   • Screening for colon cancer     Advance Care Planning  Advance Directive is not on file.  ACP discussion was held with the patient during this visit. Patient does not have an advance directive, information provided.          Objective    Vitals:    10/20/22 1117   BP: 120/70   Pulse: 73   SpO2: 98%   Weight: 66.7 kg (147 lb)   Height: 167.6 cm (65.98\")     Estimated body mass index is 23.74 kg/m² as calculated from the following:    Height as of this encounter: 167.6 cm (65.98\").    Weight as of this encounter: 66.7 kg (147 lb).    BMI is within normal parameters. No other follow-up for BMI required.      Does the patient have evidence of cognitive impairment? No    Physical Exam  Vitals reviewed.   Constitutional:       General: He " is not in acute distress.     Appearance: Normal appearance. He is normal weight. He is not ill-appearing.   HENT:      Head: Atraumatic.      Right Ear: Tympanic membrane, ear canal and external ear normal. There is no impacted cerumen.      Left Ear: Tympanic membrane, ear canal and external ear normal. There is no impacted cerumen.      Ears:      Comments: Wearing hearing aids bilaterally   Eyes:      General: No scleral icterus.  Cardiovascular:      Rate and Rhythm: Normal rate and regular rhythm.      Heart sounds: Normal heart sounds. No murmur heard.  Pulmonary:      Effort: Pulmonary effort is normal. No respiratory distress.      Breath sounds: Normal breath sounds. No wheezing.   Abdominal:      General: Bowel sounds are normal. There is no distension.      Palpations: Abdomen is soft.      Tenderness: There is no abdominal tenderness.   Musculoskeletal:      Right lower leg: No edema.      Left lower leg: No edema.      Comments: +scoiliosis    Skin:     Coloration: Skin is not jaundiced.   Neurological:      Mental Status: He is alert.   Psychiatric:         Mood and Affect: Mood normal.         Behavior: Behavior normal.         Thought Content: Thought content normal.                 HEALTH RISK ASSESSMENT    Smoking Status:  Social History     Tobacco Use   Smoking Status Never   Smokeless Tobacco Never     Alcohol Consumption:  Social History     Substance and Sexual Activity   Alcohol Use Not Currently     Fall Risk Screen:    STEADI Fall Risk Assessment was completed, and patient is at LOW risk for falls.Assessment completed on:10/20/2022    Depression Screening:  PHQ-2/PHQ-9 Depression Screening 4/12/2022   Retired PHQ-9 Total Score -   Retired Total Score -   Little Interest or Pleasure in Doing Things 0-->not at all   Feeling Down, Depressed or Hopeless 0-->not at all   PHQ-9: Brief Depression Severity Measure Score 0       Health Habits and Functional and Cognitive Screening:  Functional &  Cognitive Status 10/20/2022   Do you have difficulty preparing food and eating? No   Do you have difficulty bathing yourself, getting dressed or grooming yourself? No   Do you have difficulty using the toilet? No   Do you have difficulty moving around from place to place? No   Do you have trouble with steps or getting out of a bed or a chair? No   Current Diet Other   Dental Exam Up to date   Eye Exam Up to date   Exercise (times per week) 3 times per week   Current Exercises Include Walking;Yard Work   Current Exercise Activities Include -   Do you need help using the phone?  No   Are you deaf or do you have serious difficulty hearing?  Yes   Do you need help with transportation? Yes   Do you need help shopping? No   Do you need help preparing meals?  No   Do you need help with housework?  No   Do you need help with laundry? No   Do you need help taking your medications? No   Do you need help managing money? No   Do you ever drive or ride in a car without wearing a seat belt? No   Have you felt unusual stress, anger or loneliness in the last month? No   Who do you live with? Alone   If you need help, do you have trouble finding someone available to you? No   Have you been bothered in the last four weeks by sexual problems? -   Do you have difficulty concentrating, remembering or making decisions? No       Age-appropriate Screening Schedule:  Refer to the list below for future screening recommendations based on patient's age, sex and/or medical conditions. Orders for these recommended tests are listed in the plan section. The patient has been provided with a written plan.    Health Maintenance   Topic Date Due   • TDAP/TD VACCINES (1 - Tdap) Never done   • ZOSTER VACCINE (1 of 2) Never done   • INFLUENZA VACCINE  Completed   • LIPID PANEL  Discontinued              Assessment & Plan   CMS Preventative Services Quick Reference  Risk Factors Identified During Encounter  Cardiovascular Disease: see plan below  Hearing  Problem: wears hearing aids  Immunizations Discussed/Encouraged (specific Immunizations; Prevnar 20 (Pneumococcal 20-valent conjugate) and Shingrix     *PSA screening not indicated; history of prostate cancer and follows with [facility]  *Colon cancer screening: up to date with colonoscopy in 2019 that was normal     The above risks/problems have been discussed with the patient.  Follow up actions/plans if indicated are seen below in the Assessment/Plan Section.  Pertinent information has been shared with the patient in the After Visit Summary.    A problem based visit also took place today:    1. Liver lesion (Primary)  -Incidental finding on August 2022 CT chest abdomen pelvis of nonspecific left hepatic lobe hypodensity measuring up to 1.2 cm.  Contrast-enhanced MRI targeted to evaluate liver lesion was recommended.  Discussed this with the patient today and he is agreeable to the scan. Will order today.    2. Coronary artery calcification seen on CT scan  -Incidental finding of aortic and coronary artery calcifications seen on August 2022 CT chest abdomen pelvis at outside hospital. MRI of the brain from 8/8/2022 also demonstrated sequela of chronic microangiopathic ischemic change  -no history of hyperlipidemia; will check lipid profile today as there has not been one in several years  Lab Results   Component Value Date    CHLPL 165 11/07/2019    TRIG 78 11/07/2019    HDL 62 (H) 11/07/2019    LDL 87 11/07/2019     -denies chest pain at rest or with activity  -recommended starting aspirin 81 mg daily as well as statin therapy  - Introduced statin based therapy. Informed pt that statins are associated with rare risk of increased liver enzymes as well as severe allergy. More common side effects include muscle aches and pains that usually go away within several weeks of therapy. He was agreeable to therapy, will start atorvastatin 20 mg daily and recheck fasting lipid in 3 months        Follow Up:   No  follow-ups on file.     An After Visit Summary and PPPS were made available to the patient.

## 2022-10-21 LAB
CHOLEST SERPL-MCNC: 160 MG/DL (ref 0–200)
HDLC SERPL-MCNC: 75 MG/DL (ref 40–60)
LDLC SERPL CALC-MCNC: 73 MG/DL (ref 0–100)
LDLC/HDLC SERPL: 0.98 {RATIO}
TRIGL SERPL-MCNC: 59 MG/DL (ref 0–150)
VLDLC SERPL CALC-MCNC: 12 MG/DL (ref 5–40)

## 2022-11-15 ENCOUNTER — HOSPITAL ENCOUNTER (OUTPATIENT)
Dept: MRI IMAGING | Facility: HOSPITAL | Age: 72
Discharge: HOME | End: 2022-11-15
Admitting: STUDENT IN AN ORGANIZED HEALTH CARE EDUCATION/TRAINING PROGRAM

## 2022-11-15 DIAGNOSIS — K76.9 LIVER LESION: ICD-10-CM

## 2022-11-15 PROCEDURE — A9577 INJ MULTIHANCE: HCPCS | Performed by: STUDENT IN AN ORGANIZED HEALTH CARE EDUCATION/TRAINING PROGRAM

## 2022-11-15 PROCEDURE — 74183 MRI ABD W/O CNTR FLWD CNTR: CPT

## 2022-11-15 PROCEDURE — 0 GADOBENATE DIMEGLUMINE 529 MG/ML SOLUTION: Performed by: STUDENT IN AN ORGANIZED HEALTH CARE EDUCATION/TRAINING PROGRAM

## 2022-11-15 RX ADMIN — GADOBENATE DIMEGLUMINE 13 ML: 529 INJECTION, SOLUTION INTRAVENOUS at 10:12

## 2022-11-18 DIAGNOSIS — I10 BENIGN ESSENTIAL HTN: ICD-10-CM

## 2022-11-18 RX ORDER — METOPROLOL TARTRATE 50 MG/1
TABLET, FILM COATED ORAL
Qty: 270 TABLET | Refills: 3 | Status: SHIPPED | OUTPATIENT
Start: 2022-11-18

## 2022-12-06 ENCOUNTER — HOSPITAL ENCOUNTER (INPATIENT)
Facility: HOSPITAL | Age: 72
LOS: 3 days | Discharge: HOME | End: 2022-12-09
Attending: EMERGENCY MEDICINE | Admitting: INTERNAL MEDICINE

## 2022-12-06 ENCOUNTER — APPOINTMENT (OUTPATIENT)
Dept: CT IMAGING | Facility: HOSPITAL | Age: 72
End: 2022-12-06

## 2022-12-06 DIAGNOSIS — K80.50 CHOLEDOCHOLITHIASIS: Primary | ICD-10-CM

## 2022-12-06 DIAGNOSIS — R10.10 ACUTE UPPER ABDOMINAL PAIN: ICD-10-CM

## 2022-12-06 DIAGNOSIS — R79.89 ELEVATED LFTS: ICD-10-CM

## 2022-12-06 LAB
ALBUMIN SERPL-MCNC: 4.2 G/DL (ref 3.5–5.2)
ALBUMIN/GLOB SERPL: 1.5 G/DL
ALP SERPL-CCNC: 167 U/L (ref 39–117)
ALT SERPL W P-5'-P-CCNC: 448 U/L (ref 1–41)
ANION GAP SERPL CALCULATED.3IONS-SCNC: 9 MMOL/L (ref 5–15)
AST SERPL-CCNC: 314 U/L (ref 1–40)
BASOPHILS # BLD AUTO: 0.01 10*3/MM3 (ref 0–0.2)
BASOPHILS NFR BLD AUTO: 0.1 % (ref 0–1.5)
BILIRUB SERPL-MCNC: 1.6 MG/DL (ref 0–1.2)
BILIRUB UR QL STRIP: NEGATIVE
BUN SERPL-MCNC: 8 MG/DL (ref 8–23)
BUN/CREAT SERPL: 10.8 (ref 7–25)
CALCIUM SPEC-SCNC: 9.6 MG/DL (ref 8.6–10.5)
CHLORIDE SERPL-SCNC: 90 MMOL/L (ref 98–107)
CLARITY UR: ABNORMAL
CO2 SERPL-SCNC: 31 MMOL/L (ref 22–29)
COLOR UR: YELLOW
CREAT SERPL-MCNC: 0.74 MG/DL (ref 0.76–1.27)
D-LACTATE SERPL-SCNC: 1.7 MMOL/L (ref 0.5–2)
DEPRECATED RDW RBC AUTO: 39.1 FL (ref 37–54)
EGFRCR SERPLBLD CKD-EPI 2021: 95.7 ML/MIN/1.73
EOSINOPHIL # BLD AUTO: 0.01 10*3/MM3 (ref 0–0.4)
EOSINOPHIL NFR BLD AUTO: 0.1 % (ref 0.3–6.2)
ERYTHROCYTE [DISTWIDTH] IN BLOOD BY AUTOMATED COUNT: 11.5 % (ref 12.3–15.4)
GLOBULIN UR ELPH-MCNC: 2.8 GM/DL
GLUCOSE SERPL-MCNC: 127 MG/DL (ref 65–99)
GLUCOSE UR STRIP-MCNC: NEGATIVE MG/DL
HCT VFR BLD AUTO: 43.5 % (ref 37.5–51)
HGB BLD-MCNC: 14.5 G/DL (ref 13–17.7)
HGB UR QL STRIP.AUTO: NEGATIVE
HOLD SPECIMEN: NORMAL
HOLD SPECIMEN: NORMAL
IMM GRANULOCYTES # BLD AUTO: 0.05 10*3/MM3 (ref 0–0.05)
IMM GRANULOCYTES NFR BLD AUTO: 0.4 % (ref 0–0.5)
KETONES UR QL STRIP: NEGATIVE
LEUKOCYTE ESTERASE UR QL STRIP.AUTO: NEGATIVE
LIPASE SERPL-CCNC: 52 U/L (ref 13–60)
LYMPHOCYTES # BLD AUTO: 0.39 10*3/MM3 (ref 0.7–3.1)
LYMPHOCYTES NFR BLD AUTO: 3.5 % (ref 19.6–45.3)
MCH RBC QN AUTO: 31.6 PG (ref 26.6–33)
MCHC RBC AUTO-ENTMCNC: 33.3 G/DL (ref 31.5–35.7)
MCV RBC AUTO: 94.8 FL (ref 79–97)
MONOCYTES # BLD AUTO: 0.81 10*3/MM3 (ref 0.1–0.9)
MONOCYTES NFR BLD AUTO: 7.2 % (ref 5–12)
NEUTROPHILS NFR BLD AUTO: 88.7 % (ref 42.7–76)
NEUTROPHILS NFR BLD AUTO: 9.93 10*3/MM3 (ref 1.7–7)
NITRITE UR QL STRIP: NEGATIVE
NRBC BLD AUTO-RTO: 0 /100 WBC (ref 0–0.2)
PH UR STRIP.AUTO: 8 [PH] (ref 5–8)
PLATELET # BLD AUTO: 186 10*3/MM3 (ref 140–450)
PMV BLD AUTO: 9.6 FL (ref 6–12)
POTASSIUM SERPL-SCNC: 4 MMOL/L (ref 3.5–5.2)
PROT SERPL-MCNC: 7 G/DL (ref 6–8.5)
PROT UR QL STRIP: ABNORMAL
RBC # BLD AUTO: 4.59 10*6/MM3 (ref 4.14–5.8)
SODIUM SERPL-SCNC: 130 MMOL/L (ref 136–145)
SP GR UR STRIP: 1.02 (ref 1–1.03)
UROBILINOGEN UR QL STRIP: ABNORMAL
WBC NRBC COR # BLD: 11.2 10*3/MM3 (ref 3.4–10.8)
WHOLE BLOOD HOLD COAG: NORMAL
WHOLE BLOOD HOLD SPECIMEN: NORMAL

## 2022-12-06 PROCEDURE — 83605 ASSAY OF LACTIC ACID: CPT

## 2022-12-06 PROCEDURE — 99221 1ST HOSP IP/OBS SF/LOW 40: CPT | Performed by: SURGERY

## 2022-12-06 PROCEDURE — 99284 EMERGENCY DEPT VISIT MOD MDM: CPT

## 2022-12-06 PROCEDURE — 83690 ASSAY OF LIPASE: CPT

## 2022-12-06 PROCEDURE — 25010000002 PIPERACILLIN SOD-TAZOBACTAM PER 1 G: Performed by: EMERGENCY MEDICINE

## 2022-12-06 PROCEDURE — 85025 COMPLETE CBC W/AUTO DIFF WBC: CPT

## 2022-12-06 PROCEDURE — 74177 CT ABD & PELVIS W/CONTRAST: CPT

## 2022-12-06 PROCEDURE — 25010000002 IOPAMIDOL 61 % SOLUTION: Performed by: EMERGENCY MEDICINE

## 2022-12-06 PROCEDURE — 80053 COMPREHEN METABOLIC PANEL: CPT

## 2022-12-06 PROCEDURE — 99222 1ST HOSP IP/OBS MODERATE 55: CPT | Performed by: INTERNAL MEDICINE

## 2022-12-06 PROCEDURE — 81003 URINALYSIS AUTO W/O SCOPE: CPT

## 2022-12-06 RX ORDER — AMLODIPINE BESYLATE 5 MG/1
5 TABLET ORAL DAILY
Status: DISCONTINUED | OUTPATIENT
Start: 2022-12-07 | End: 2022-12-09 | Stop reason: HOSPADM

## 2022-12-06 RX ORDER — ACETAMINOPHEN 325 MG/1
650 TABLET ORAL EVERY 4 HOURS PRN
Status: DISCONTINUED | OUTPATIENT
Start: 2022-12-06 | End: 2022-12-09 | Stop reason: HOSPADM

## 2022-12-06 RX ORDER — ACETAMINOPHEN 650 MG/1
650 SUPPOSITORY RECTAL EVERY 4 HOURS PRN
Status: DISCONTINUED | OUTPATIENT
Start: 2022-12-06 | End: 2022-12-09 | Stop reason: HOSPADM

## 2022-12-06 RX ORDER — SODIUM CHLORIDE 9 MG/ML
75 INJECTION, SOLUTION INTRAVENOUS CONTINUOUS
Status: DISCONTINUED | OUTPATIENT
Start: 2022-12-06 | End: 2022-12-09

## 2022-12-06 RX ORDER — SODIUM CHLORIDE 0.9 % (FLUSH) 0.9 %
10 SYRINGE (ML) INJECTION AS NEEDED
Status: DISCONTINUED | OUTPATIENT
Start: 2022-12-06 | End: 2022-12-09 | Stop reason: HOSPADM

## 2022-12-06 RX ORDER — PANTOPRAZOLE SODIUM 40 MG/1
40 TABLET, DELAYED RELEASE ORAL EVERY MORNING
Status: DISCONTINUED | OUTPATIENT
Start: 2022-12-07 | End: 2022-12-09 | Stop reason: HOSPADM

## 2022-12-06 RX ORDER — SODIUM CHLORIDE 1000 MG
1 TABLET, SOLUBLE MISCELLANEOUS 2 TIMES DAILY WITH MEALS
Status: DISCONTINUED | OUTPATIENT
Start: 2022-12-06 | End: 2022-12-09 | Stop reason: HOSPADM

## 2022-12-06 RX ORDER — ACETAMINOPHEN 160 MG/5ML
650 SOLUTION ORAL EVERY 4 HOURS PRN
Status: DISCONTINUED | OUTPATIENT
Start: 2022-12-06 | End: 2022-12-09 | Stop reason: HOSPADM

## 2022-12-06 RX ORDER — ATORVASTATIN CALCIUM 20 MG/1
20 TABLET, FILM COATED ORAL DAILY
Status: DISCONTINUED | OUTPATIENT
Start: 2022-12-07 | End: 2022-12-09 | Stop reason: HOSPADM

## 2022-12-06 RX ORDER — TAMSULOSIN HYDROCHLORIDE 0.4 MG/1
0.4 CAPSULE ORAL DAILY
Status: DISCONTINUED | OUTPATIENT
Start: 2022-12-07 | End: 2022-12-09 | Stop reason: HOSPADM

## 2022-12-06 RX ORDER — ONDANSETRON 2 MG/ML
4 INJECTION INTRAMUSCULAR; INTRAVENOUS EVERY 6 HOURS PRN
Status: DISCONTINUED | OUTPATIENT
Start: 2022-12-06 | End: 2022-12-09 | Stop reason: HOSPADM

## 2022-12-06 RX ORDER — LEVETIRACETAM 500 MG/1
500 TABLET ORAL 2 TIMES DAILY
Status: DISCONTINUED | OUTPATIENT
Start: 2022-12-06 | End: 2022-12-09 | Stop reason: HOSPADM

## 2022-12-06 RX ADMIN — SODIUM CHLORIDE TAB 1 GM 1 G: 1 TAB at 22:02

## 2022-12-06 RX ADMIN — IOPAMIDOL 100 ML: 612 INJECTION, SOLUTION INTRAVENOUS at 12:58

## 2022-12-06 RX ADMIN — SODIUM CHLORIDE 75 ML/HR: 9 INJECTION, SOLUTION INTRAVENOUS at 22:32

## 2022-12-06 RX ADMIN — TAZOBACTAM SODIUM AND PIPERACILLIN SODIUM 3.38 G: 375; 3 INJECTION, SOLUTION INTRAVENOUS at 16:47

## 2022-12-06 RX ADMIN — LEVETIRACETAM 500 MG: 500 TABLET, FILM COATED ORAL at 22:01

## 2022-12-06 RX ADMIN — SODIUM CHLORIDE, POTASSIUM CHLORIDE, SODIUM LACTATE AND CALCIUM CHLORIDE 1000 ML: 600; 310; 30; 20 INJECTION, SOLUTION INTRAVENOUS at 11:29

## 2022-12-06 RX ADMIN — Medication 10 ML: at 22:01

## 2022-12-06 RX ADMIN — METOPROLOL TARTRATE 75 MG: 25 TABLET, FILM COATED ORAL at 22:01

## 2022-12-06 NOTE — ED TRIAGE NOTES
Patient to ED per PV from home, ambulatory to triage w/ reports of upper abd pain. Patient states he recently had a abd MRI; showed stones in gallstones.

## 2022-12-06 NOTE — ED NOTES
Nursing report ED to floor  Yeyo Abebe  72 y.o.  male    HPI :   Chief Complaint   Patient presents with    Abdominal Pain       Admitting doctor:   Gloria Wolfe MD    Admitting diagnosis:   The primary encounter diagnosis was Choledocholithiasis. Diagnoses of Elevated LFTs and Acute upper abdominal pain were also pertinent to this visit.    Code status:   Current Code Status       Date Active Code Status Order ID Comments User Context       Not on file            Allergies:   Aspirin-caffeine and Codeine    Isolation:   No active isolations    Intake and Output    Intake/Output Summary (Last 24 hours) at 12/6/2022 1639  Last data filed at 12/6/2022 1226  Gross per 24 hour   Intake 1000 ml   Output --   Net 1000 ml       Weight:   There were no vitals filed for this visit.    Most recent vitals:   Vitals:    12/06/22 1307 12/06/22 1401 12/06/22 1409 12/06/22 1501   BP:  149/92  152/94   BP Location:       Patient Position:       Pulse: 84 70 89 85   Resp:       Temp:       TempSrc:       SpO2: 96% 98% 98% 96%   Height:           Active LDAs/IV Access:   Lines, Drains & Airways       Active LDAs       Name Placement date Placement time Site Days    Peripheral IV 12/06/22 1127 Anterior;Right Forearm 12/06/22  1127  Forearm  less than 1                    Labs (abnormal labs have a star):   Labs Reviewed   COMPREHENSIVE METABOLIC PANEL - Abnormal; Notable for the following components:       Result Value    Glucose 127 (*)     Creatinine 0.74 (*)     Sodium 130 (*)     Chloride 90 (*)     CO2 31.0 (*)     ALT (SGPT) 448 (*)     AST (SGOT) 314 (*)     Alkaline Phosphatase 167 (*)     Total Bilirubin 1.6 (*)     All other components within normal limits    Narrative:     GFR Normal >60  Chronic Kidney Disease <60  Kidney Failure <15    The GFR formula is only valid for adults with stable renal function between ages 18 and 70.   URINALYSIS W/ MICROSCOPIC IF INDICATED (NO CULTURE) - Abnormal; Notable for the  following components:    Appearance, UA Cloudy (*)     Protein, UA Trace (*)     Urobilinogen, UA 2.0 E.U./dL (*)     All other components within normal limits    Narrative:     Urine microscopic not indicated.   CBC WITH AUTO DIFFERENTIAL - Abnormal; Notable for the following components:    WBC 11.20 (*)     RDW 11.5 (*)     Neutrophil % 88.7 (*)     Lymphocyte % 3.5 (*)     Eosinophil % 0.1 (*)     Neutrophils, Absolute 9.93 (*)     Lymphocytes, Absolute 0.39 (*)     All other components within normal limits   LIPASE - Normal   LACTIC ACID, PLASMA - Normal   RAINBOW DRAW    Narrative:     The following orders were created for panel order [facility] Draw.  Procedure                               Abnormality         Status                     ---------                               -----------         ------                     Green Top (Gel)[[id number]]                                  Final result               Lavender Top[[id number]]                                     Final result               Gold Top - SST[[id number]]                                   Final result               Light Blue Top[[id number]]                                   Final result                 Please view results for these tests on the individual orders.   CBC AND DIFFERENTIAL    Narrative:     The following orders were created for panel order CBC & Differential.  Procedure                               Abnormality         Status                     ---------                               -----------         ------                     CBC Auto Differential[[id number]]        Abnormal            Final result                 Please view results for these tests on the individual orders.   GREEN TOP   LAVENDER TOP   GOLD TOP - SST   LIGHT BLUE TOP       EKG:   No orders to display       Meds given in ED:   Medications   sodium chloride 0.9 % flush 10 mL (has no administration in time range)   sodium chloride 0.9 % flush 10 mL (has no  administration in time range)   piperacillin-tazobactam (ZOSYN) 3.375 g in iso-osmotic dextrose 50 ml (premix) (has no administration in time range)   lactated ringers bolus 1,000 mL (0 mL Intravenous Stopped 12/6/22 1226)   iopamidol (ISOVUE-300) 61 % injection 100 mL (100 mL Intravenous Given 12/6/22 1258)       Imaging results:  CT Abdomen Pelvis With Contrast    Result Date: 12/6/2022  Cholelithiasis and choledocholithiasis. Gallstones are not well appreciated on CT, however are much better demonstrated on the recent comparison MRI. There is a trace amount of pericholecystic fluid which may reflect cholecystitis. There is no evidence for biliary ductal dilation.  Radiation dose reduction techniques were utilized, including automated exposure control and exposure modulation based on body size.  This report was finalized on 12/6/2022 1:51 PM by Dr. Agustin Monahan M.D.       Ambulatory status:   -up ad nba    Social issues:   Social History     Socioeconomic History    Marital status:    Tobacco Use    Smoking status: Never    Smokeless tobacco: Never   Substance and Sexual Activity    Alcohol use: Not Currently    Drug use: Never       NIH Stroke Scale:         Bethanie Lepe RN  12/06/22 16:39 EST      Pt alert and oriented x4. Pt up ad nba. Call [phone] for questions.

## 2022-12-06 NOTE — ED PROVIDER NOTES
EMERGENCY DEPARTMENT ENCOUNTER    Room Number:  P396/1  Date of encounter:  12/6/2022  PCP: Bird Hernandez DO  Historian: Patient     I used full protective equipment while examining this patient.  This includes face mask, gloves and protective eyewear.  I washed my hands before entering the room and immediately upon leaving the room.  Patient was wearing a surgical mask.      HPI:  Chief Complaint: Upper abdominal pain  A complete HPI/ROS/PMH/PSH/SH/FH are unobtainable due to: None    Context: Yeyo Abebe is a 72 y.o. male who presents to the ED c/o upper abdominal pain that began around midnight last night.  Pain is constant but waxing and waning.  Pain does not radiate.  Pain is described as sharp and aching.  Nothing makes the pain better or worse.  Pain was not related to eating.  Pain was 9/10 at worst and is currently 1/10.  Patient reports associated sweating.  Denies fever, chills, cough, shortness of breath, nausea, vomiting, diarrhea, constipation, dysuria, hematuria, or flank pain.  He had an abdominal MRI last month which showed gallstones.  He has had a hernia repair.      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   • Cancer (HCC) 09/06/2019   • Chest pain 07/18/2013   • Diastolic dysfunction 09/06/2019   • Environmental allergies 09/06/2019   • Hypertension 09/06/2019   • Osteoarthritis of lumbar spine 09/06/2019   • History of prostate cancer 09/06/2019   • Screening for colon cancer 11/22/2019     Resolved  Ambulatory Problems     Diagnosis Date Noted   • Benign essential HTN 04/08/2016   • MAUDE (obstructive sleep apnea) 04/08/2016   • Cellulitis of right lower extremity 08/15/2016   • Laryngospasm 08/15/2016   • Hypercholesterolemia 09/06/2019     Past Medical History:   Diagnosis Date   • Allergies    • BPH (benign prostatic hyperplasia)    • GERD (gastroesophageal reflux disease)    • Hyponatremia    • Liver cyst    • Pancreatic cyst    • Personal history of kidney stones    • Polio    • Prostate CA (HCC)    • Scoliosis    • SIADH (syndrome of inappropriate ADH production) (HCC)          PAST SURGICAL HISTORY  Past Surgical History:   Procedure Laterality Date   • ABDOMINAL HERNIA REPAIR N/A 2008   • BACK SURGERY N/A 1958    Dr. Hurt, scoliosis repair   • COLONOSCOPY N/A 2010    [facility]   • COLONOSCOPY N/A 12/11/2019    Procedure: COLONOSCOPY TO CECUM;  Surgeon: Shayan Taveras MD;  [place] [facility];  Service: General   • ENDOSCOPY N/A 05/25/2016    Procedure: ESOPHAGOGASTRODUODENOSCOPY WITH ANESTHESIA;  Surgeon: Shayan Taveras MD;  [place] [facility];  Service:    • ENDOSCOPY  12/11/2019    Procedure: ESOPHAGOGASTRODUODENOSCOPY WITH COLD BIOPSIES, CLIP PLACEMENT X1;  Surgeon: Shayan Taveras MD;  [place] [facility];  Service: General   • PROSTATE RADIOACTIVE SEED IMPLANT     • SPINAL FUSION      child         FAMILY HISTORY  Family History   Problem Relation Age of Onset   • Stroke Mother    • Pulmonary fibrosis Father            • No Known Problems Sister    • No Known Problems Sister    • No Known Problems Brother          SOCIAL HISTORY  Social History     Socioeconomic History   • Marital status:    Tobacco Use   • Smoking status: Never   • Smokeless tobacco: Never   Substance and Sexual Activity   • Alcohol use: Not Currently   • Drug use: Never         ALLERGIES  Aspirin-caffeine and Codeine       REVIEW OF SYSTEMS  Review of Systems       All systems have been reviewed and are negative except as as discussed in the HPI    PHYSICAL EXAM    I have reviewed the triage vital signs and nursing notes.    ED Triage Vitals   Temp Heart Rate Resp BP SpO2   12/06/22 0509 12/06/22 0509 12/06/22 0509 12/06/22 0510 12/06/22 0509   98 °F (36.7 °C) 90 18 133/80 96 %      Temp src Heart Rate Source Patient Position BP Location FiO2 (%)   12/06/22 0509 12/06/22 0509 12/06/22 0510 12/06/22 0510 --   Tympanic Monitor Standing Left arm        Physical Exam  GENERAL: Awake, alert, oriented x3.  Well-developed, well-nourished elderly male.  Resting comfortably no acute distress  HENT: NCAT, nares patent, moist mucous membranes  EYES: Extraocular muscles intact, no scleral icterus  CV: regular rhythm, regular rate  RESPIRATORY: normal effort, clear to auscultation bilaterally  ABDOMEN: soft, there is mild right upper quadrant and epigastric tenderness, no rebound tenderness or guarding, no CVA tenderness, bowel sounds are normal  MUSCULOSKELETAL: Extremities are nontender and without obvious deformity.  There is no pedal edema  NEURO: Speech is normal.  No facial droop.  Follows commands.  SKIN: warm, dry, no rash  PSYCH: Normal mood and affect      LAB RESULTS  Recent Results (from the past 24 hour(s))   Urinalysis With Microscopic If Indicated (No Culture) - Urine, Clean Catch    Collection Time: 12/06/22  6:45 AM    Specimen: Urine, Clean Catch   Result Value Ref Range    Color, UA Yellow Yellow, Straw    Appearance, UA Cloudy (A) Clear    pH, UA 8.0 5.0 - 8.0    Specific Gravity, UA 1.016 1.005 - 1.030    Glucose, UA Negative Negative    Ketones, UA Negative Negative    Bilirubin, UA Negative Negative    Blood, UA Negative Negative    Protein, UA Trace (A) Negative    Leuk Esterase, UA Negative Negative    Nitrite, UA Negative Negative    Urobilinogen, UA 2.0 E.U./dL (A) 0.2 - 1.0 E.U./dL   Comprehensive Metabolic Panel    Collection Time: 12/06/22  6:56 AM     Specimen: Blood   Result Value Ref Range    Glucose 127 (H) 65 - 99 mg/dL    BUN 8 8 - 23 mg/dL    Creatinine 0.74 (L) 0.76 - 1.27 mg/dL    Sodium 130 (L) 136 - 145 mmol/L    Potassium 4.0 3.5 - 5.2 mmol/L    Chloride 90 (L) 98 - 107 mmol/L    CO2 31.0 (H) 22.0 - 29.0 mmol/L    Calcium 9.6 8.6 - 10.5 mg/dL    Total Protein 7.0 6.0 - 8.5 g/dL    Albumin 4.20 3.50 - 5.20 g/dL    ALT (SGPT) 448 (H) 1 - 41 U/L    AST (SGOT) 314 (H) 1 - 40 U/L    Alkaline Phosphatase 167 (H) 39 - 117 U/L    Total Bilirubin 1.6 (H) 0.0 - 1.2 mg/dL    Globulin 2.8 gm/dL    A/G Ratio 1.5 g/dL    BUN/Creatinine Ratio 10.8 7.0 - 25.0    Anion Gap 9.0 5.0 - 15.0 mmol/L    eGFR 95.7 >60.0 mL/min/1.73   Lipase    Collection Time: 12/06/22  6:56 AM    Specimen: Blood   Result Value Ref Range    Lipase 52 13 - 60 U/L   Lactic Acid, Plasma    Collection Time: 12/06/22  6:56 AM    Specimen: Blood   Result Value Ref Range    Lactate 1.7 0.5 - 2.0 mmol/L   Green Top (Gel)    Collection Time: 12/06/22  6:56 AM   Result Value Ref Range    Extra Tube Hold for add-ons.    Lavender Top    Collection Time: 12/06/22  6:56 AM   Result Value Ref Range    Extra Tube hold for add-on    Gold Top - SST    Collection Time: 12/06/22  6:56 AM   Result Value Ref Range    Extra Tube Hold for add-ons.    Light Blue Top    Collection Time: 12/06/22  6:56 AM   Result Value Ref Range    Extra Tube Hold for add-ons.    CBC Auto Differential    Collection Time: 12/06/22  6:56 AM    Specimen: Blood   Result Value Ref Range    WBC 11.20 (H) 3.40 - 10.80 10*3/mm3    RBC 4.59 4.14 - 5.80 10*6/mm3    Hemoglobin 14.5 13.0 - 17.7 g/dL    Hematocrit 43.5 37.5 - 51.0 %    MCV 94.8 79.0 - 97.0 fL    MCH 31.6 26.6 - 33.0 pg    MCHC 33.3 31.5 - 35.7 g/dL    RDW 11.5 (L) 12.3 - 15.4 %    RDW-SD 39.1 37.0 - 54.0 fl    MPV 9.6 6.0 - 12.0 fL    Platelets 186 140 - 450 10*3/mm3    Neutrophil % 88.7 (H) 42.7 - 76.0 %    Lymphocyte % 3.5 (L) 19.6 - 45.3 %    Monocyte % 7.2 5.0 - 12.0 %     Eosinophil % 0.1 (L) 0.3 - 6.2 %    Basophil % 0.1 0.0 - 1.5 %    Immature Grans % 0.4 0.0 - 0.5 %    Neutrophils, Absolute 9.93 (H) 1.70 - 7.00 10*3/mm3    Lymphocytes, Absolute 0.39 (L) 0.70 - 3.10 10*3/mm3    Monocytes, Absolute 0.81 0.10 - 0.90 10*3/mm3    Eosinophils, Absolute 0.01 0.00 - 0.40 10*3/mm3    Basophils, Absolute 0.01 0.00 - 0.20 10*3/mm3    Immature Grans, Absolute 0.05 0.00 - 0.05 10*3/mm3    nRBC 0.0 0.0 - 0.2 /100 WBC       Ordered the above labs and independently reviewed the results.      RADIOLOGY  CT Abdomen Pelvis With Contrast    Result Date: 12/6/2022  CT ABDOMEN AND PELVIS WITH IV CONTRAST  HISTORY: Upper abdominal pain, elevated LFTs  TECHNIQUE: Radiation dose reduction techniques were utilized, including automated exposure control and exposure modulation based on body size. Axial images were obtained through the abdomen and pelvis after the administration of IV contrast. Coronal and sagittal reformatted images obtained.  COMPARISON: 08/07/2022  FINDINGS:  ABDOMEN: Lung bases are clear. Tiny cysts are again seen in the inferior liver. The gallbladder is filled with numerous gallstones, which are much better appreciated on the comparison MRI. Trace amount of pericholecystic fluid. Also on the prior MRI gallstones are demonstrated within the common bile duct, which are relatively occult on this examination. There is no evidence of intrahepatic biliary ductal dilation. Spleen is unremarkable. Small bilateral renal cysts. The adrenal glands are unremarkable. Tiny cystic lesion in the pancreatic head is again noted.  PELVIS: The bladder is unremarkable. No free fluid in the pelvis. Sigmoid diverticulosis without evidence for diverticulitis. Appendix is normal. Bone windows show severe scoliosis      Cholelithiasis and choledocholithiasis. Gallstones are not well appreciated on CT, however are much better demonstrated on the recent comparison MRI. There is a trace amount of pericholecystic  fluid which may reflect cholecystitis. There is no evidence for biliary ductal dilation.  Radiation dose reduction techniques were utilized, including automated exposure control and exposure modulation based on body size.  This report was finalized on 12/6/2022 1:51 PM by Dr. Agustin Monahan M.D.        I ordered the above noted radiological studies. Reviewed by me and discussed with radiologist.  See dictation for official radiology interpretation.      PROCEDURES  Procedures      MEDICATIONS GIVEN IN ER    Medications   sodium chloride 0.9 % flush 10 mL (has no administration in time range)   sodium chloride 0.9 % flush 10 mL (has no administration in time range)   acetaminophen (TYLENOL) tablet 650 mg (has no administration in time range)     Or   acetaminophen (TYLENOL) 160 MG/5ML solution 650 mg (has no administration in time range)     Or   acetaminophen (TYLENOL) suppository 650 mg (has no administration in time range)   ondansetron (ZOFRAN) injection 4 mg (has no administration in time range)   lactated ringers bolus 1,000 mL (0 mL Intravenous Stopped 12/6/22 1226)   iopamidol (ISOVUE-300) 61 % injection 100 mL (100 mL Intravenous Given 12/6/22 1258)   piperacillin-tazobactam (ZOSYN) 3.375 g in iso-osmotic dextrose 50 ml (premix) (3.375 g Intravenous New Bag 12/6/22 1647)         PROGRESS, DATA ANALYSIS, CONSULTS, AND MEDICAL DECISION MAKING    All labs have been independently reviewed by me.  All radiology studies have been reviewed by me and discussed with radiologist dictating the report.   EKG's independently viewed and interpreted by me.  I have reviewed the nurse's notes, vital signs, past medical history, and medication list.  Discussion below represents my analysis of pertinent findings related to patient's condition, differential diagnosis, treatment plan and final disposition.      ED Course as of 12/06/22 1812 Tue Dec 06, 2022   1115 ALT (SGPT)(!): 448 [WH]   1115 AST (SGOT)(!): 314 [WH]   1115  Alkaline Phosphatase(!): 167 [WH]   1115 Total Bilirubin(!): 1.6  LFTs were normal in August 2022 except for an alkaline phosphatase of 136 [WH]   1115 WBC(!): 11.20 [WH]   1116 Old records reviewed.  Patient had an MRI of the abdomen done on 11/15/2022.  This showed a left hepatic lobe lesion which had decreased in size since previous study.  There was a 0.8 cm cystic lesion in the pancreatic head.  Gallstones were present. [WH]   1125 Patient presents to the ED complaining of upper abdominal pain.  He has mild upper abdominal tenderness but does not have an acute abdomen.  White blood cell count and LFTs are elevated.  Recent MRI showed gallstones.  We will obtain a CT abdomen/pelvis for further evaluation.    Differential diagnosis includes but is not limited to:  - hepatobiliary pathology such as cholecystitis, cholangitis, and symptomatic cholelithiasis  - Pancreatitis  - Dyspepsia  - Small bowel obstruction  - Appendicitis  - Diverticulitis  - UTI including pyelonephritis  - Ureteral stone  - Zoster  - Colitis, including infectious and ischemic  - Atypical ACS     [WH]   1401 CT abdomen/pelvis interpreted by the radiologist.  Images independently viewed and interpreted by me.  There is cholelithiasis and choledocholithiasis.  There are numerous gallstones.  There is trace pericholecystic fluid.  There is no intrahepatic ductal dilatation. [WH]   1415 Test results discussed with the patient.  His pain remains 1/10.  On reexam, there is very minimal right upper quadrant and epigastric tenderness but no rebound or guarding.  Case will be discussed with the hospitalist, general surgery, and GI.  Patient will be admitted. [WH]   1441 Case discussed with Dr. Wolfe and she agrees to admit the patient to a [facility] bed.  Pertinent history, exam findings, test results, ED course, diagnoses, and plan for GI and surgical consultation were discussed with her. [WH]   1452 Case discussed with Dr. Seth and he agrees to see  the patient in consult. [WH]   1457 Case discussed with Dr. Cole.  He recommends consulting Dr. Martin as the patient will likely need an ERCP. [WH]   1710 Dr. Martin has been consulted but did not return the call while the patient was in the ED. [WH]   1811 Patient presented the ED complaining of upper abdominal pain.  Pain was initially severe but then improved.  Patient's LFTs are elevated.  CT scan showed multiple gallstones as well as choledocholithiasis.  There was trace pericholecystic fluid.  White blood cell count was minimally elevated.  Patient did not have an acute abdomen and was only mildly tender.  Case was discussed with the hospitalist and with general surgery.  GI is also been consulted.  Patient was given IV fluids and IV antibiotics. [WH]      ED Course User Index  [WH] Nikhil Baig MD       AS OF 18:12 EST VITALS:    BP - 151/85  HR - 77  TEMP - 98 °F (36.7 °C) (Tympanic)  O2 SATS - 98%      DIAGNOSIS  Final diagnoses:   Choledocholithiasis   Elevated LFTs   Acute upper abdominal pain         DISPOSITION  ADMISSION    Discussed treatment plan and reason for admission with pt/family and admitting physician.  Pt/family voiced understanding of the plan for admission for further testing/treatment as needed.         Dictated utilizing Dragon dictation     Nikhil Baig MD  12/06/22 1813

## 2022-12-06 NOTE — H&P (VIEW-ONLY)
Chief Complaint   Patient presents with   • Abdominal Pain       Yeyo Abebe is a 72 y.o. male who presents with abdominal pain    HPI  Mr. Abebe is a 72 yoM w h/o GERD, HTN, prostate cancer, prio rabdominal hernia repair who presents with upper abdominal pain.  He reports some nausea.  He didn't really notice it with eating yesterday.  He has had episodes like this off and on a few times per year.  He reports whereas pain previously relented it was constant last night.  No fever or chills.  He reports pain has improved since presenting to the hospital.   He reports he was admitted to hospital in [place] earlier this year with COVID.  He had imaging at that time and was told to follow up here for repeat imaging.  He had MR abd w and wo contrast 10/2020 to follow up abnormal OSH CT abd/pelvis that noted hepatic lesion.  That MR noted cholelithiasis and no intra or extrahepatic gordy ductal dilatation, 0.8 x 0.5 x 0.5 cm hyperintense lesion in left hepatic lobe corresponding to OSH imaging that were thought to be likely benign, also had 0.8 cm cystic lesion in the head of pancreas without suspicious features with recommendations for repeat MR in 2 years.   CT abd/pelvis w contrast here notable for tiny cysts in liver, numerous gallstones in GB, trace perichol fluid, choleodcholithiasis which were also present on previous MR without gordy dil, tiny cystic lesion in pancreatic head.  Lipase normal.   Latest Reference Range & Units 12/06/22 06:56   Alkaline Phosphatase 39 - 117 U/L 167 (H)   Total Protein 6.0 - 8.5 g/dL 7.0   ALT (SGPT) 1 - 41 U/L 448 (H)   AST (SGOT) 1 - 40 U/L 314 (H)   Total Bilirubin 0.0 - 1.2 mg/dL 1.6 (H)   (H): Data is abnormally high  Past Medical History:   Diagnosis Date   • Allergies    • BPH (benign prostatic hyperplasia)    • GERD (gastroesophageal reflux disease)    • Hypertension    • Hyponatremia    • Liver cyst    • Pancreatic cyst    • Personal history of kidney stones    • Polio      1950s   • Prostate CA (HCC)    • Scoliosis    • SIADH (syndrome of inappropriate ADH production) (HCC)        Past Surgical History:   Procedure Laterality Date   • ABDOMINAL HERNIA REPAIR N/A 2008   • BACK SURGERY N/A 1958    Dr. Hurt, scoliosis repair   • COLONOSCOPY N/A 2010    [facility]   • COLONOSCOPY N/A 12/11/2019    Procedure: COLONOSCOPY TO CECUM;  Surgeon: Shayan Taveras MD;  Location: [facility] ENDOSCOPY;  Service: General   • ENDOSCOPY N/A 05/25/2016    Procedure: ESOPHAGOGASTRODUODENOSCOPY WITH ANESTHESIA;  Surgeon: Shayan Taveras MD;  Location: [facility] ENDOSCOPY;  Service:    • ENDOSCOPY  12/11/2019    Procedure: ESOPHAGOGASTRODUODENOSCOPY WITH COLD BIOPSIES, CLIP PLACEMENT X1;  Surgeon: Shayan Taveras MD;  Location: [facility] ENDOSCOPY;  Service: General   • PROSTATE RADIOACTIVE SEED IMPLANT     • SPINAL FUSION      child         Current Facility-Administered Medications:   •  sodium chloride 0.9 % flush 10 mL, 10 mL, Intravenous, PRN, Nikhil Baig MD  •  [COMPLETED] Insert Peripheral IV, , , Once **AND** sodium chloride 0.9 % flush 10 mL, 10 mL, Intravenous, PRN, Nikhil Baig MD    Current Outpatient Medications:   •  amLODIPine (NORVASC) 5 MG tablet, Take 1 tablet by mouth Daily., Disp: 90 tablet, Rfl: 3  •  atorvastatin (LIPITOR) 20 MG tablet, Take 1 tablet by mouth Daily., Disp: 90 tablet, Rfl: 1  •  Calcium Carbonate-Vitamin D (calcium-vitamin D) 500-200 MG-UNIT tablet per tablet, Take 1 tablet by mouth 2 (Two) Times a Day With Meals., Disp: , Rfl:   •  furosemide (LASIX) 20 MG tablet, Take 1 tablet by mouth Daily., Disp: , Rfl:   •  levETIRAcetam (KEPPRA) 500 MG tablet, Take 500 mg by mouth 2 (Two) Times a Day., Disp: , Rfl:   •  Loratadine 10 MG capsule, Take 1 tablet by mouth daily., Disp: , Rfl:   •  metoprolol tartrate (LOPRESSOR) 50 MG tablet, TAKE 1 AND 1/2 TABLETS BY MOUTH TWICE DAILY, Disp: 270 tablet, Rfl: 3  •  omeprazole (priLOSEC) 40 MG capsule, Take  1 capsule by mouth 2 (Two) Times a Day Before Meals., Disp: 60 capsule, Rfl: 2  •  sodium chloride 1 g tablet, Takes 1 tablet in the morning and 1 evening., Disp: , Rfl:   •  tamsulosin (FLOMAX) 0.4 MG capsule 24 hr capsule, Take 0.4 mg by mouth Daily., Disp: , Rfl: 1    Allergies   Allergen Reactions   • Aspirin-Caffeine Other (See Comments)     SWEATING     • Codeine Anxiety     ALSO SWEATING AND ELEVATED BODY TEMPERATURE       Social History     Socioeconomic History   • Marital status:    Tobacco Use   • Smoking status: Never   • Smokeless tobacco: Never   Substance and Sexual Activity   • Alcohol use: Not Currently   • Drug use: Never       Family History   Problem Relation Age of Onset   • Stroke Mother    • Pulmonary fibrosis Father            • No Known Problems Sister    • No Known Problems Sister    • No Known Problems Brother        Review of Systems   Constitutional: Negative for chills and fever.   Respiratory: Negative for cough and shortness of breath.    Gastrointestinal: Positive for abdominal pain and nausea. Negative for abdominal distention and vomiting.   Skin: Negative for color change and rash.   All other systems reviewed and are negative.      Vitals:    12/06/22 1631   BP: 140/84   Pulse: 73   Resp:    Temp:    SpO2: 95%       Physical Exam     General: patient awake, alert and cooperative   Eyes: Normal lids and lashes, no scleral icterus   Neck: supple, normal ROM   Skin: warm and dry, not jaundiced   Cardiovascular: regular rate, regular rhythm, well perfused extremities   Pulm: Equal expansion bilaterally, no increased WOB   Abdomen: soft, non-tender, nondistended    Extremities: no rash or edema              Neuro: A&O, no obvious sign of focal deficit   Psychiatric: Normal mood and behavior; memory intact    CT Abdomen Pelvis With Contrast    Result Date: 12/6/2022  Cholelithiasis and choledocholithiasis. Gallstones are not well appreciated on CT, however are much  better demonstrated on the recent comparison MRI. There is a trace amount of pericholecystic fluid which may reflect cholecystitis. There is no evidence for biliary ductal dilation.  Radiation dose reduction techniques were utilized, including automated exposure control and exposure modulation based on body size.  This report was finalized on 12/6/2022 1:51 PM by Dr. Agustin Monahan M.D.        Impression:  1. Abdominal Pain  2. Elevated LFTs and Tbili  3. Choledocholithiasis  4. Cholelithiasis  5. HTN  6. Cystic lesion of pancreas (0.8 cm, needs outpatient surveillance imaging)    Plan:  - No current evidence of cholangitis (Tbili 1.6, non-dilated CBD)   - Will need ERCP to clear the duct  - Surgery consulted for eventual CCY  - Started on abx in ED  - Monitor CMP  - NPO after midnight for ERCP tomorrow    Yimi Cuellar MD

## 2022-12-06 NOTE — PLAN OF CARE
Goal Outcome Evaluation: Pt is alert. Room air. Sitting up in the chair watching T.v. Bowel sound hypoactive. Currently NPO.

## 2022-12-06 NOTE — CONSULTS
Chief Complaint   Patient presents with   • Abdominal Pain       Yeyo Abebe is a 72 y.o. male who presents with abdominal pain    HPI  Mr. Abebe is a 72 yoM w h/o GERD, HTN, prostate cancer, prio rabdominal hernia repair who presents with upper abdominal pain.  He reports some nausea.  He didn't really notice it with eating yesterday.  He has had episodes like this off and on a few times per year.  He reports whereas pain previously relented it was constant last night.  No fever or chills.  He reports pain has improved since presenting to the hospital.   He reports he was admitted to hospital in [place] earlier this year with COVID.  He had imaging at that time and was told to follow up here for repeat imaging.  He had MR abd w and wo contrast 10/2020 to follow up abnormal OSH CT abd/pelvis that noted hepatic lesion.  That MR noted cholelithiasis and no intra or extrahepatic gordy ductal dilatation, 0.8 x 0.5 x 0.5 cm hyperintense lesion in left hepatic lobe corresponding to OSH imaging that were thought to be likely benign, also had 0.8 cm cystic lesion in the head of pancreas without suspicious features with recommendations for repeat MR in 2 years.   CT abd/pelvis w contrast here notable for tiny cysts in liver, numerous gallstones in GB, trace perichol fluid, choleodcholithiasis which were also present on previous MR without gordy dil, tiny cystic lesion in pancreatic head.  Lipase normal.   Latest Reference Range & Units 12/06/22 06:56   Alkaline Phosphatase 39 - 117 U/L 167 (H)   Total Protein 6.0 - 8.5 g/dL 7.0   ALT (SGPT) 1 - 41 U/L 448 (H)   AST (SGOT) 1 - 40 U/L 314 (H)   Total Bilirubin 0.0 - 1.2 mg/dL 1.6 (H)   (H): Data is abnormally high  Past Medical History:   Diagnosis Date   • Allergies    • BPH (benign prostatic hyperplasia)    • GERD (gastroesophageal reflux disease)    • Hypertension    • Hyponatremia    • Liver cyst    • Pancreatic cyst    • Personal history of kidney stones    • Polio      1950s   • Prostate CA (HCC)    • Scoliosis    • SIADH (syndrome of inappropriate ADH production) (HCC)        Past Surgical History:   Procedure Laterality Date   • ABDOMINAL HERNIA REPAIR N/A 2008   • BACK SURGERY N/A 1958    Dr. Hurt, scoliosis repair   • COLONOSCOPY N/A 2010    [facility]   • COLONOSCOPY N/A 12/11/2019    Procedure: COLONOSCOPY TO CECUM;  Surgeon: Shayan Taveras MD;  Location: [facility] ENDOSCOPY;  Service: General   • ENDOSCOPY N/A 05/25/2016    Procedure: ESOPHAGOGASTRODUODENOSCOPY WITH ANESTHESIA;  Surgeon: Shayan Taveras MD;  Location: [facility] ENDOSCOPY;  Service:    • ENDOSCOPY  12/11/2019    Procedure: ESOPHAGOGASTRODUODENOSCOPY WITH COLD BIOPSIES, CLIP PLACEMENT X1;  Surgeon: Shayan Taveras MD;  Location: [facility] ENDOSCOPY;  Service: General   • PROSTATE RADIOACTIVE SEED IMPLANT     • SPINAL FUSION      child         Current Facility-Administered Medications:   •  sodium chloride 0.9 % flush 10 mL, 10 mL, Intravenous, PRN, Nikhil Baig MD  •  [COMPLETED] Insert Peripheral IV, , , Once **AND** sodium chloride 0.9 % flush 10 mL, 10 mL, Intravenous, PRN, Nikhil Baig MD    Current Outpatient Medications:   •  amLODIPine (NORVASC) 5 MG tablet, Take 1 tablet by mouth Daily., Disp: 90 tablet, Rfl: 3  •  atorvastatin (LIPITOR) 20 MG tablet, Take 1 tablet by mouth Daily., Disp: 90 tablet, Rfl: 1  •  Calcium Carbonate-Vitamin D (calcium-vitamin D) 500-200 MG-UNIT tablet per tablet, Take 1 tablet by mouth 2 (Two) Times a Day With Meals., Disp: , Rfl:   •  furosemide (LASIX) 20 MG tablet, Take 1 tablet by mouth Daily., Disp: , Rfl:   •  levETIRAcetam (KEPPRA) 500 MG tablet, Take 500 mg by mouth 2 (Two) Times a Day., Disp: , Rfl:   •  Loratadine 10 MG capsule, Take 1 tablet by mouth daily., Disp: , Rfl:   •  metoprolol tartrate (LOPRESSOR) 50 MG tablet, TAKE 1 AND 1/2 TABLETS BY MOUTH TWICE DAILY, Disp: 270 tablet, Rfl: 3  •  omeprazole (priLOSEC) 40 MG capsule, Take  1 capsule by mouth 2 (Two) Times a Day Before Meals., Disp: 60 capsule, Rfl: 2  •  sodium chloride 1 g tablet, Takes 1 tablet in the morning and 1 evening., Disp: , Rfl:   •  tamsulosin (FLOMAX) 0.4 MG capsule 24 hr capsule, Take 0.4 mg by mouth Daily., Disp: , Rfl: 1    Allergies   Allergen Reactions   • Aspirin-Caffeine Other (See Comments)     SWEATING     • Codeine Anxiety     ALSO SWEATING AND ELEVATED BODY TEMPERATURE       Social History     Socioeconomic History   • Marital status:    Tobacco Use   • Smoking status: Never   • Smokeless tobacco: Never   Substance and Sexual Activity   • Alcohol use: Not Currently   • Drug use: Never       Family History   Problem Relation Age of Onset   • Stroke Mother    • Pulmonary fibrosis Father            • No Known Problems Sister    • No Known Problems Sister    • No Known Problems Brother        Review of Systems   Constitutional: Negative for chills and fever.   Respiratory: Negative for cough and shortness of breath.    Gastrointestinal: Positive for abdominal pain and nausea. Negative for abdominal distention and vomiting.   Skin: Negative for color change and rash.   All other systems reviewed and are negative.      Vitals:    12/06/22 1631   BP: 140/84   Pulse: 73   Resp:    Temp:    SpO2: 95%       Physical Exam     General: patient awake, alert and cooperative   Eyes: Normal lids and lashes, no scleral icterus   Neck: supple, normal ROM   Skin: warm and dry, not jaundiced   Cardiovascular: regular rate, regular rhythm, well perfused extremities   Pulm: Equal expansion bilaterally, no increased WOB   Abdomen: soft, non-tender, nondistended    Extremities: no rash or edema              Neuro: A&O, no obvious sign of focal deficit   Psychiatric: Normal mood and behavior; memory intact    CT Abdomen Pelvis With Contrast    Result Date: 12/6/2022  Cholelithiasis and choledocholithiasis. Gallstones are not well appreciated on CT, however are much  better demonstrated on the recent comparison MRI. There is a trace amount of pericholecystic fluid which may reflect cholecystitis. There is no evidence for biliary ductal dilation.  Radiation dose reduction techniques were utilized, including automated exposure control and exposure modulation based on body size.  This report was finalized on 12/6/2022 1:51 PM by Dr. Agustin Monahan M.D.        Impression:  1. Abdominal Pain  2. Elevated LFTs and Tbili  3. Choledocholithiasis  4. Cholelithiasis  5. HTN  6. Cystic lesion of pancreas (0.8 cm, needs outpatient surveillance imaging)    Plan:  - No current evidence of cholangitis (Tbili 1.6, non-dilated CBD)   - Will need ERCP to clear the duct  - Surgery consulted for eventual CCY  - Started on abx in ED  - Monitor CMP  - NPO after midnight for ERCP tomorrow    Yimi Cuellar MD

## 2022-12-06 NOTE — CONSULTS
Consultation note    Referring physician: Gloria Wolfe, *    Consulting Physician: Milton Seth MD    Reason for consultation: Cholelithiasis, choledocholithiasis    Chief Complaint   Patient presents with   • Abdominal Pain       HPI:   The patient is a very pleasant 72 y.o. years old male that presented to the hospital with abdominal pain.  Patient reports abdominal pain has been going on for the past 24 hours.  Pain located over the epigastric area.  He thought was related to dehydration and started taking more water.  The pain did not improve in fact continued to get worse.  He was associated with nausea but not vomiting.  Patient presented to the emergency room for further evaluation.  He underwent CT scan abdomen pelvis and he was found to have cholelithiasis as well as choledocholithiasis.  Patient reports having similar episodes of pain in the past that he attributed to dehydration due to the fact that he has diagnosis of hyponatremia and has been on fluid restriction.  He denies any recent change of bowel habits.  Denies any fevers or chills      Past Medical History:   Diagnosis Date   • Allergies    • BPH (benign prostatic hyperplasia)    • GERD (gastroesophageal reflux disease)    • Hypertension    • Hyponatremia    • Liver cyst    • Pancreatic cyst    • Personal history of kidney stones    • Polio     1950s   • Prostate CA (HCC)    • Scoliosis    • SIADH (syndrome of inappropriate ADH production) (HCC)        Past Surgical History:   Procedure Laterality Date   • ABDOMINAL HERNIA REPAIR N/A 2008   • BACK SURGERY N/A 1958    Dr. Hurt, scoliosis repair   • COLONOSCOPY N/A 2010    [facility]   • COLONOSCOPY N/A 12/11/2019    Procedure: COLONOSCOPY TO CECUM;  Surgeon: Shayan Taveras MD;  Location: [facility] ENDOSCOPY;  Service: General   • ENDOSCOPY N/A 05/25/2016    Procedure: ESOPHAGOGASTRODUODENOSCOPY WITH ANESTHESIA;  Surgeon: Shayna Taveras MD;  Location: [facility];   Service:    • ENDOSCOPY  12/11/2019    Procedure: ESOPHAGOGASTRODUODENOSCOPY WITH COLD BIOPSIES, CLIP PLACEMENT X1;  Surgeon: Shayan Taveras MD;  Location: [facility] ENDOSCOPY;  Service: General   • PROSTATE RADIOACTIVE SEED IMPLANT     • SPINAL FUSION      child         Current Facility-Administered Medications:   •  acetaminophen (TYLENOL) tablet 650 mg, 650 mg, Oral, Q4H PRN **OR** acetaminophen (TYLENOL) 160 MG/5ML solution 650 mg, 650 mg, Oral, Q4H PRN **OR** acetaminophen (TYLENOL) suppository 650 mg, 650 mg, Rectal, Q4H PRN, Gloria Wolfe MD  •  ondansetron (ZOFRAN) injection 4 mg, 4 mg, Intravenous, Q6H PRN, Gloria Wolfe MD  •  sodium chloride 0.9 % flush 10 mL, 10 mL, Intravenous, PRN, Nikhil Baig MD  •  [COMPLETED] Insert Peripheral IV, , , Once **AND** sodium chloride 0.9 % flush 10 mL, 10 mL, Intravenous, PRN, Nikhil Baig MD    Allergies   Allergen Reactions   • Aspirin-Caffeine Other (See Comments)     SWEATING     • Codeine Anxiety     ALSO SWEATING AND ELEVATED BODY TEMPERATURE       Social History     Socioeconomic History   • Marital status:    Tobacco Use   • Smoking status: Never   • Smokeless tobacco: Never   Substance and Sexual Activity   • Alcohol use: Not Currently   • Drug use: Never       Family History   Problem Relation Age of Onset   • Stroke Mother    • Pulmonary fibrosis Father            • No Known Problems Sister    • No Known Problems Sister    • No Known Problems Brother        ROS:   Constitutional: denies any weight changes, fatigue or weakness.  Eyes: : denies blurred or double vision  Cardiovascular: denies chest pain, palpitations, edemas.  Respiratory: denies cough, sputum, SOB.  Gastrointestinal: denies  diarrhea, constipation.  Genitourinary: denies dysuria, frequency.  Endocrine: denies cold intolerance, lethargy and flushing.  Hem: denies excessive bruising and postop bleeding.  Musculoskeletal: denies weakness,  joint swelling, pain or stiffness.  Neuro: denies seizures, CVA, paresthesia, or peripheral neuropathy.   Skin: denies change in nevi, rashes, masses.    Physical Exam:   Vitals:    12/06/22 1827   BP: 129/89   Pulse: 93   Resp: 16   Temp: 97 °F (36.1 °C)   SpO2: 97%     GENERAL:alert, well appearing, and in no distress and oriented to person, place, and time  HEENT: normochephalic, atraumatic, no scleral icterus moist mucous membranes.  NECK: Supple there is no thyromegaly or lymphadenopathy  CHEST: There is a well healed horizontal scar  CARDIAC: regular rate and rhythm    ABDOMEN: soft, nontender, nondistended, no masses or organomegaly.  There is evidence of umbilical hernia repair  EXTREMITIES: no cyanosis, clubbing or edema    NEURO: alert and oriented, normal speech, cranial nerves 2-12 grossly intact, no focal deficits   SKIN: Moist, warm, no rashes.    Diagnostic workup:   CT scan abdomen pelvis that was reviewed by me today show evidence of cholelithiasis without cholecystitis with small amount of pericholecystic fluid.  There is questionable choledocholithiasis.  There is no intra or extrahepatic biliary tree dilation.  There is evidence of prior umbilical hernia repair with mesh placement    MRI abdomen pelvis 11/15/2022 there is evidence of cholelithiasis and choledocholithiasis    Alk phos 167, , , total bilirubin 1.6, sodium 130, CO2 31, chloride 90, creatinine 0.7  Lactate normal  White blood cell count 11.2, hemoglobin 14.5, platelets 186    Assessment and plan:   The patient is a very pleasant 72 y.o. years old male with cholelithiasis, choledocholithiasis.  I discussed with the patient about further step.  I think that he should undergo ERCP tomorrow with Dr. Cuellar he will then need laparoscopic cholecystectomy with intraoperative cholangiogram.  He does not have any signs or symptoms of acute cholecystitis.  Risk and benefits of procedure including bleeding, infection,  intra-abdominal injuries, common bile duct injury, bile leak discussed in detail with the patient that verbalized understanding and agree with the plan    -ERCP tomorrow  -Lap cesar IOC on Thursday    Case was discussed with Dr. Cuellar and patient.    Risk and benefits of the current recommended treatment were explained to the patient that had time to ask questions that where answered, verbalized understanding and agreed with the plan.     Milton Seth MD  General, Minimally Invasive and Endoscopic Surgery  [facility]    [address], [address]  [place], [place], [address]  P: [phone]  F: [phone]

## 2022-12-07 ENCOUNTER — APPOINTMENT (OUTPATIENT)
Dept: GENERAL RADIOLOGY | Facility: HOSPITAL | Age: 72
End: 2022-12-07

## 2022-12-07 ENCOUNTER — PREP FOR SURGERY (OUTPATIENT)
Dept: OTHER | Facility: HOSPITAL | Age: 72
End: 2022-12-07

## 2022-12-07 ENCOUNTER — ANESTHESIA EVENT (OUTPATIENT)
Dept: GASTROENTEROLOGY | Facility: HOSPITAL | Age: 72
End: 2022-12-07

## 2022-12-07 ENCOUNTER — ANESTHESIA (OUTPATIENT)
Dept: GASTROENTEROLOGY | Facility: HOSPITAL | Age: 72
End: 2022-12-07

## 2022-12-07 DIAGNOSIS — Z46.89 ENCOUNTER FOR REMOVAL OF BILIARY STENT: Primary | ICD-10-CM

## 2022-12-07 DIAGNOSIS — K80.50 CHOLEDOCHOLITHIASIS: ICD-10-CM

## 2022-12-07 LAB
ANION GAP SERPL CALCULATED.3IONS-SCNC: 6.3 MMOL/L (ref 5–15)
BASOPHILS # BLD AUTO: 0.02 10*3/MM3 (ref 0–0.2)
BASOPHILS NFR BLD AUTO: 0.3 % (ref 0–1.5)
BUN SERPL-MCNC: 8 MG/DL (ref 8–23)
BUN/CREAT SERPL: 13.6 (ref 7–25)
CALCIUM SPEC-SCNC: 9.2 MG/DL (ref 8.6–10.5)
CHLORIDE SERPL-SCNC: 91 MMOL/L (ref 98–107)
CO2 SERPL-SCNC: 30.7 MMOL/L (ref 22–29)
CREAT SERPL-MCNC: 0.59 MG/DL (ref 0.76–1.27)
DEPRECATED RDW RBC AUTO: 38.1 FL (ref 37–54)
EGFRCR SERPLBLD CKD-EPI 2021: 102.4 ML/MIN/1.73
EOSINOPHIL # BLD AUTO: 0.07 10*3/MM3 (ref 0–0.4)
EOSINOPHIL NFR BLD AUTO: 1 % (ref 0.3–6.2)
ERYTHROCYTE [DISTWIDTH] IN BLOOD BY AUTOMATED COUNT: 11.3 % (ref 12.3–15.4)
GLUCOSE SERPL-MCNC: 66 MG/DL (ref 65–99)
HCT VFR BLD AUTO: 41.5 % (ref 37.5–51)
HGB BLD-MCNC: 14.3 G/DL (ref 13–17.7)
IMM GRANULOCYTES # BLD AUTO: 0.05 10*3/MM3 (ref 0–0.05)
IMM GRANULOCYTES NFR BLD AUTO: 0.7 % (ref 0–0.5)
LYMPHOCYTES # BLD AUTO: 0.71 10*3/MM3 (ref 0.7–3.1)
LYMPHOCYTES NFR BLD AUTO: 10 % (ref 19.6–45.3)
MCH RBC QN AUTO: 31.4 PG (ref 26.6–33)
MCHC RBC AUTO-ENTMCNC: 34.5 G/DL (ref 31.5–35.7)
MCV RBC AUTO: 91 FL (ref 79–97)
MONOCYTES # BLD AUTO: 0.57 10*3/MM3 (ref 0.1–0.9)
MONOCYTES NFR BLD AUTO: 8 % (ref 5–12)
NEUTROPHILS NFR BLD AUTO: 5.67 10*3/MM3 (ref 1.7–7)
NEUTROPHILS NFR BLD AUTO: 80 % (ref 42.7–76)
NRBC BLD AUTO-RTO: 0 /100 WBC (ref 0–0.2)
PLATELET # BLD AUTO: 192 10*3/MM3 (ref 140–450)
PMV BLD AUTO: 9.7 FL (ref 6–12)
POTASSIUM SERPL-SCNC: 4.3 MMOL/L (ref 3.5–5.2)
RBC # BLD AUTO: 4.56 10*6/MM3 (ref 4.14–5.8)
SODIUM SERPL-SCNC: 128 MMOL/L (ref 136–145)
WBC NRBC COR # BLD: 7.09 10*3/MM3 (ref 3.4–10.8)

## 2022-12-07 PROCEDURE — 43264 ERCP REMOVE DUCT CALCULI: CPT | Performed by: INTERNAL MEDICINE

## 2022-12-07 PROCEDURE — 0F798DZ DILATION OF COMMON BILE DUCT WITH INTRALUMINAL DEVICE, VIA NATURAL OR ARTIFICIAL OPENING ENDOSCOPIC: ICD-10-PCS | Performed by: INTERNAL MEDICINE

## 2022-12-07 PROCEDURE — 99232 SBSQ HOSP IP/OBS MODERATE 35: CPT | Performed by: SURGERY

## 2022-12-07 PROCEDURE — 25010000002 LEVOFLOXACIN PER 250 MG: Performed by: INTERNAL MEDICINE

## 2022-12-07 PROCEDURE — 0FC98ZZ EXTIRPATION OF MATTER FROM COMMON BILE DUCT, VIA NATURAL OR ARTIFICIAL OPENING ENDOSCOPIC: ICD-10-PCS | Performed by: INTERNAL MEDICINE

## 2022-12-07 PROCEDURE — C1769 GUIDE WIRE: HCPCS | Performed by: INTERNAL MEDICINE

## 2022-12-07 PROCEDURE — 85025 COMPLETE CBC W/AUTO DIFF WBC: CPT | Performed by: INTERNAL MEDICINE

## 2022-12-07 PROCEDURE — 0DB68ZX EXCISION OF STOMACH, VIA NATURAL OR ARTIFICIAL OPENING ENDOSCOPIC, DIAGNOSTIC: ICD-10-PCS | Performed by: INTERNAL MEDICINE

## 2022-12-07 PROCEDURE — 36415 COLL VENOUS BLD VENIPUNCTURE: CPT | Performed by: INTERNAL MEDICINE

## 2022-12-07 PROCEDURE — 80048 BASIC METABOLIC PNL TOTAL CA: CPT | Performed by: INTERNAL MEDICINE

## 2022-12-07 PROCEDURE — 43273 ENDOSCOPIC PANCREATOSCOPY: CPT | Performed by: INTERNAL MEDICINE

## 2022-12-07 PROCEDURE — 25010000002 PROPOFOL 10 MG/ML EMULSION: Performed by: ANESTHESIOLOGY

## 2022-12-07 PROCEDURE — 25010000002 ONDANSETRON PER 1 MG: Performed by: INTERNAL MEDICINE

## 2022-12-07 PROCEDURE — 0 IOPAMIDOL PER 1 ML: Performed by: INTERNAL MEDICINE

## 2022-12-07 PROCEDURE — 88305 TISSUE EXAM BY PATHOLOGIST: CPT | Performed by: INTERNAL MEDICINE

## 2022-12-07 PROCEDURE — 25010000002 DEXAMETHASONE PER 1 MG: Performed by: ANESTHESIOLOGY

## 2022-12-07 PROCEDURE — 43274 ERCP DUCT STENT PLACEMENT: CPT | Performed by: INTERNAL MEDICINE

## 2022-12-07 PROCEDURE — 25010000002 GLUCAGON (RDNA) PER 1 MG: Performed by: INTERNAL MEDICINE

## 2022-12-07 PROCEDURE — C2617 STENT, NON-COR, TEM W/O DEL: HCPCS | Performed by: INTERNAL MEDICINE

## 2022-12-07 PROCEDURE — 74328 X-RAY BILE DUCT ENDOSCOPY: CPT

## 2022-12-07 PROCEDURE — 43239 EGD BIOPSY SINGLE/MULTIPLE: CPT | Performed by: INTERNAL MEDICINE

## 2022-12-07 DEVICE — BILIARY STENT
Type: IMPLANTABLE DEVICE | Site: ESOPHAGUS | Status: FUNCTIONAL
Brand: ADVANIX™ BILIARY

## 2022-12-07 RX ORDER — INDOMETHACIN 100 MG
1 SUPPOSITORY, RECTAL RECTAL ONCE
Status: COMPLETED | OUTPATIENT
Start: 2022-12-07 | End: 2022-12-07

## 2022-12-07 RX ORDER — ROCURONIUM BROMIDE 10 MG/ML
INJECTION, SOLUTION INTRAVENOUS AS NEEDED
Status: DISCONTINUED | OUTPATIENT
Start: 2022-12-07 | End: 2022-12-07 | Stop reason: SURG

## 2022-12-07 RX ORDER — LIDOCAINE HYDROCHLORIDE 20 MG/ML
INJECTION, SOLUTION INFILTRATION; PERINEURAL AS NEEDED
Status: DISCONTINUED | OUTPATIENT
Start: 2022-12-07 | End: 2022-12-07 | Stop reason: SURG

## 2022-12-07 RX ORDER — SODIUM CHLORIDE 0.9 % (FLUSH) 0.9 %
10 SYRINGE (ML) INJECTION EVERY 12 HOURS SCHEDULED
Status: DISCONTINUED | OUTPATIENT
Start: 2022-12-07 | End: 2022-12-08

## 2022-12-07 RX ORDER — LEVOFLOXACIN 5 MG/ML
500 INJECTION, SOLUTION INTRAVENOUS ONCE
Status: COMPLETED | OUTPATIENT
Start: 2022-12-07 | End: 2022-12-07

## 2022-12-07 RX ORDER — SODIUM CHLORIDE 9 MG/ML
30 INJECTION, SOLUTION INTRAVENOUS CONTINUOUS PRN
Status: DISCONTINUED | OUTPATIENT
Start: 2022-12-07 | End: 2022-12-09 | Stop reason: HOSPADM

## 2022-12-07 RX ORDER — DEXAMETHASONE SODIUM PHOSPHATE 4 MG/ML
INJECTION, SOLUTION INTRA-ARTICULAR; INTRALESIONAL; INTRAMUSCULAR; INTRAVENOUS; SOFT TISSUE AS NEEDED
Status: DISCONTINUED | OUTPATIENT
Start: 2022-12-07 | End: 2022-12-07 | Stop reason: SURG

## 2022-12-07 RX ORDER — SODIUM CHLORIDE 0.9 % (FLUSH) 0.9 %
10 SYRINGE (ML) INJECTION AS NEEDED
Status: DISCONTINUED | OUTPATIENT
Start: 2022-12-07 | End: 2022-12-08

## 2022-12-07 RX ORDER — SODIUM CHLORIDE, SODIUM LACTATE, POTASSIUM CHLORIDE, CALCIUM CHLORIDE 600; 310; 30; 20 MG/100ML; MG/100ML; MG/100ML; MG/100ML
INJECTION, SOLUTION INTRAVENOUS CONTINUOUS PRN
Status: DISCONTINUED | OUTPATIENT
Start: 2022-12-07 | End: 2022-12-07 | Stop reason: SURG

## 2022-12-07 RX ORDER — PROPOFOL 10 MG/ML
VIAL (ML) INTRAVENOUS AS NEEDED
Status: DISCONTINUED | OUTPATIENT
Start: 2022-12-07 | End: 2022-12-07 | Stop reason: SURG

## 2022-12-07 RX ADMIN — METOPROLOL TARTRATE 75 MG: 25 TABLET, FILM COATED ORAL at 21:32

## 2022-12-07 RX ADMIN — METOPROLOL TARTRATE 75 MG: 25 TABLET, FILM COATED ORAL at 13:43

## 2022-12-07 RX ADMIN — ONDANSETRON HYDROCHLORIDE 4 MG: 2 SOLUTION INTRAMUSCULAR; INTRAVENOUS at 11:01

## 2022-12-07 RX ADMIN — PROPOFOL 100 MG: 10 INJECTION, EMULSION INTRAVENOUS at 10:22

## 2022-12-07 RX ADMIN — PROPOFOL 200 MG: 10 INJECTION, EMULSION INTRAVENOUS at 10:17

## 2022-12-07 RX ADMIN — LEVETIRACETAM 500 MG: 500 TABLET, FILM COATED ORAL at 21:32

## 2022-12-07 RX ADMIN — LEVETIRACETAM 500 MG: 500 TABLET, FILM COATED ORAL at 13:43

## 2022-12-07 RX ADMIN — SODIUM CHLORIDE TAB 1 GM 1 G: 1 TAB at 13:43

## 2022-12-07 RX ADMIN — ROCURONIUM BROMIDE 5 MG: 50 INJECTION INTRAVENOUS at 10:17

## 2022-12-07 RX ADMIN — SODIUM CHLORIDE TAB 1 GM 1 G: 1 TAB at 18:20

## 2022-12-07 RX ADMIN — Medication 10 ML: at 21:33

## 2022-12-07 RX ADMIN — PANTOPRAZOLE SODIUM 40 MG: 40 TABLET, DELAYED RELEASE ORAL at 06:19

## 2022-12-07 RX ADMIN — LEVOFLOXACIN 500 MG: 5 INJECTION, SOLUTION INTRAVENOUS at 10:30

## 2022-12-07 RX ADMIN — SODIUM CHLORIDE, POTASSIUM CHLORIDE, SODIUM LACTATE AND CALCIUM CHLORIDE: 600; 310; 30; 20 INJECTION, SOLUTION INTRAVENOUS at 10:17

## 2022-12-07 RX ADMIN — ROCURONIUM BROMIDE 15 MG: 50 INJECTION INTRAVENOUS at 10:26

## 2022-12-07 RX ADMIN — ATORVASTATIN CALCIUM 20 MG: 20 TABLET, FILM COATED ORAL at 13:43

## 2022-12-07 RX ADMIN — SUGAMMADEX 200 MG: 100 INJECTION, SOLUTION INTRAVENOUS at 11:23

## 2022-12-07 RX ADMIN — AMLODIPINE BESYLATE 5 MG: 5 TABLET ORAL at 13:43

## 2022-12-07 RX ADMIN — SODIUM CHLORIDE 75 ML/HR: 9 INJECTION, SOLUTION INTRAVENOUS at 22:30

## 2022-12-07 RX ADMIN — LIDOCAINE HYDROCHLORIDE 60 MG: 20 INJECTION, SOLUTION INFILTRATION; PERINEURAL at 10:17

## 2022-12-07 RX ADMIN — Medication 10 ML: at 13:46

## 2022-12-07 RX ADMIN — Medication 100 MG: at 10:30

## 2022-12-07 RX ADMIN — TAMSULOSIN HYDROCHLORIDE 0.4 MG: 0.4 CAPSULE ORAL at 13:43

## 2022-12-07 RX ADMIN — DEXAMETHASONE SODIUM PHOSPHATE 6 MG: 4 INJECTION, SOLUTION INTRA-ARTICULAR; INTRALESIONAL; INTRAMUSCULAR; INTRAVENOUS; SOFT TISSUE at 11:05

## 2022-12-07 RX ADMIN — SODIUM CHLORIDE 30 ML/HR: 9 INJECTION, SOLUTION INTRAVENOUS at 09:40

## 2022-12-07 NOTE — ANESTHESIA PREPROCEDURE EVALUATION
Anesthesia Evaluation                  Airway   Mallampati: III  TM distance: <3 FB  Possible difficult intubation  Dental      Comment: All upper front caps    Pulmonary - normal exam     ROS comment: Vocal cord dysfunction  Cardiovascular - normal exam    Patient on routine beta blocker and Beta blocker given within 24 hours of surgery    (+) hypertension,       Neuro/Psych  GI/Hepatic/Renal/Endo    (+)  GERD,  liver disease history of elevated LFT, renal disease stones,     Musculoskeletal     Abdominal    Substance History      OB/GYN          Other   arthritis,    history of cancer remission                    Anesthesia Plan    ASA 3     general     intravenous induction     Anesthetic plan, risks, benefits, and alternatives have been provided, discussed and informed consent has been obtained with: patient.        CODE STATUS:    Code Status (Patient has no pulse and is not breathing): CPR (Attempt to Resuscitate)  Medical Interventions (Patient has pulse or is breathing): Full Support

## 2022-12-07 NOTE — H&P
HISTORY AND PHYSICAL   [facility]        Date of Admission: 2022  Patient Identification:  Name: Yeyo Abebe  Age: 72 y.o.  Sex: male  :  1950  MRN: [id number]                     Primary Care Physician: Bird Hernandez DO    Chief Complaint:  72 year old gentleman presented to the emergency room with abdominal pain which started last night; it has been intermittent but severe when it occurs; he has had sme nausea; denies fever or chills; he had previously been diagnosed with gallstones; presently the pain has subsided again    History of Present Illness:   As above    Past Medical History:  Past Medical History:   Diagnosis Date   • Allergies    • BPH (benign prostatic hyperplasia)    • GERD (gastroesophageal reflux disease)    • Hypertension    • Hyponatremia    • Liver cyst    • Pancreatic cyst    • Personal history of kidney stones    • Polio        • Prostate CA (HCC)    • Scoliosis    • SIADH (syndrome of inappropriate ADH production) (HCC)      Past Surgical History:  Past Surgical History:   Procedure Laterality Date   • ABDOMINAL HERNIA REPAIR N/A    • BACK SURGERY N/A     Dr. Hurt, scoliosis repair   • COLONOSCOPY N/A     [facility]   • COLONOSCOPY N/A 2019    Procedure: COLONOSCOPY TO CECUM;  Surgeon: Shayan Tvaeras MD;  Location: [facility] ENDOSCOPY;  Service: General   • ENDOSCOPY N/A 2016    Procedure: ESOPHAGOGASTRODUODENOSCOPY WITH ANESTHESIA;  Surgeon: Shayan Taveras MD;  Location: [facility] ENDOSCOPY;  Service:    • ENDOSCOPY  2019    Procedure: ESOPHAGOGASTRODUODENOSCOPY WITH COLD BIOPSIES, CLIP PLACEMENT X1;  Surgeon: Shayan Taveras MD;  Location: [facility] ENDOSCOPY;  Service: General   • PROSTATE RADIOACTIVE SEED IMPLANT     • SPINAL FUSION      child      Home Meds:  Medications Prior to Admission   Medication Sig Dispense Refill Last Dose   • amLODIPine (NORVASC) 5 MG tablet Take 1 tablet by mouth Daily. 90 tablet 3  12/6/2022   • atorvastatin (LIPITOR) 20 MG tablet Take 1 tablet by mouth Daily. 90 tablet 1    • Calcium Carbonate-Vitamin D (calcium-vitamin D) 500-200 MG-UNIT tablet per tablet Take 1 tablet by mouth 2 (Two) Times a Day With Meals.      • furosemide (LASIX) 20 MG tablet Take 1 tablet by mouth Daily.      • levETIRAcetam (KEPPRA) 500 MG tablet Take 500 mg by mouth 2 (Two) Times a Day.      • Loratadine 10 MG capsule Take 1 tablet by mouth daily.      • metoprolol tartrate (LOPRESSOR) 50 MG tablet TAKE 1 AND 1/2 TABLETS BY MOUTH TWICE DAILY 270 tablet 3    • omeprazole (priLOSEC) 40 MG capsule Take 1 capsule by mouth 2 (Two) Times a Day Before Meals. 60 capsule 2    • sodium chloride 1 g tablet Takes 1 tablet in the morning and 1 evening.      • tamsulosin (FLOMAX) 0.4 MG capsule 24 hr capsule Take 0.4 mg by mouth Daily.  1        Allergies:  Allergies   Allergen Reactions   • Aspirin-Caffeine Other (See Comments)     SWEATING     • Codeine Anxiety     ALSO SWEATING AND ELEVATED BODY TEMPERATURE     Immunizations:  Immunization History   Administered Date(s) Administered   • COVID-19 (PFIZER) BIVALENT BOOSTER 12+YRS 10/04/2022   • COVID-19 (PFIZER) PURPLE CAP 02/03/2021, 02/24/2021, 10/07/2021   • Fluad Quad 65+ 10/04/2021   • Fluzone High Dose =>65 Years ([employer] ONLY) 11/02/2015, 10/28/2016, 10/24/2017, 09/30/2018, 10/20/2019   • Fluzone High-Dose 65+yrs 10/15/2020, 10/04/2022   • Hepatitis A 03/02/2019, 12/07/2019   • Influenza, Unspecified 10/15/2020, 10/04/2021     Social History:   Social History     Social History Narrative   • Not on file     Social History     Socioeconomic History   • Marital status:    Tobacco Use   • Smoking status: Never   • Smokeless tobacco: Never   Substance and Sexual Activity   • Alcohol use: Not Currently   • Drug use: Never       Family History:  Family History   Problem Relation Age of Onset   • Stroke Mother    • Pulmonary fibrosis Father            • No  "Known Problems Sister    • No Known Problems Sister    • No Known Problems Brother         Review of Systems  See history of present illness and past medical history.  Patient denies headache, dizziness, syncope, falls, trauma, change in vision, change in hearing, change in taste, changes in weight, changes in appetite, focal weakness, numbness, or paresthesia.  Patient denies chest pain, palpitations, dyspnea, orthopnea, PND, cough, sinus pressure, rhinorrhea, epistaxis, hemoptysis, nausea, vomiting,hematemesis, diarrhea, constipation or hematchezia.  Denies cold or heat intolerance, polydipsia, polyuria, polyphagia. Denies hematuria, pyuria, dysuria, hesitancy, frequency or urgency. Denies consumption of raw and under cooked meats foods or change in water source.  Denies fever, chills, sweats, night sweats.  Denies missing any routine medications. Remainder of ROS is negative.    Objective:  T Max 24 hrs: Temp (24hrs), Av.5 °F (36.4 °C), Min:97 °F (36.1 °C), Max:98 °F (36.7 °C)    Vitals Ranges:   Temp:  [97 °F (36.1 °C)-98 °F (36.7 °C)] 97 °F (36.1 °C)  Heart Rate:  [70-93] 93  Resp:  [16-18] 16  BP: (129-164)/(80-96) 129/89      Exam:  /89 (BP Location: Left arm, Patient Position: Sitting)   Pulse 93   Temp 97 °F (36.1 °C) (Oral)   Resp 16   Ht 167.6 cm (66\")   SpO2 97%   BMI 23.73 kg/m²     General Appearance:    Alert, cooperative, no distress, appears stated age   Head:    Normocephalic, without obvious abnormality, atraumatic   Eyes:    PERRL, conjunctivae/corneas clear, EOM's intact, both eyes   Ears:    Normal external ear canals, both ears   Nose:   Nares normal, septum midline, mucosa normal, no drainage    or sinus tenderness   Throat:   Lips, mucosa, and tongue normal   Neck:   Supple, symmetrical, trachea midline, no adenopathy;     thyroid:  no enlargement/tenderness/nodules; no carotid    bruit or JVD   Back:     Symmetric, no curvature, ROM normal, no CVA tenderness   Lungs:     " Decreased breath sounds bilaterally, respirations unlabored   Chest Wall:    No tenderness or deformity    Heart:    Regular rate and rhythm, S1 and S2 normal, no murmur, rub   or gallop   Abdomen:     Soft, nontender, bowel sounds active all four quadrants,     no masses, no hepatomegaly, no splenomegaly   Extremities:   Extremities normal, atraumatic, no cyanosis or edema   Pulses:   2+ and symmetric all extremities   Skin:   Skin color, texture, turgor normal, no rashes or lesions   Lymph nodes:   Cervical, supraclavicular, and axillary nodes normal   Neurologic:   CNII-XII intact, normal strength, sensation intact throughout      .    Data Review:  Labs in chart were reviewed.  WBC   Date Value Ref Range Status   12/06/2022 11.20 (H) 3.40 - 10.80 10*3/mm3 Final     Hemoglobin   Date Value Ref Range Status   12/06/2022 14.5 13.0 - 17.7 g/dL Final     Hematocrit   Date Value Ref Range Status   12/06/2022 43.5 37.5 - 51.0 % Final     Platelets   Date Value Ref Range Status   12/06/2022 186 140 - 450 10*3/mm3 Final     Sodium   Date Value Ref Range Status   12/06/2022 130 (L) 136 - 145 mmol/L Final     Potassium   Date Value Ref Range Status   12/06/2022 4.0 3.5 - 5.2 mmol/L Final     Chloride   Date Value Ref Range Status   12/06/2022 90 (L) 98 - 107 mmol/L Final     CO2   Date Value Ref Range Status   12/06/2022 31.0 (H) 22.0 - 29.0 mmol/L Final     BUN   Date Value Ref Range Status   12/06/2022 8 8 - 23 mg/dL Final     Creatinine   Date Value Ref Range Status   12/06/2022 0.74 (L) 0.76 - 1.27 mg/dL Final     Glucose   Date Value Ref Range Status   12/06/2022 127 (H) 65 - 99 mg/dL Final     Calcium   Date Value Ref Range Status   12/06/2022 9.6 8.6 - 10.5 mg/dL Final                Imaging Results (All)     Procedure Component Value Units Date/Time    CT Abdomen Pelvis With Contrast [[id number]] Collected: 12/06/22 1336     Updated: 12/06/22 1354    Narrative:      CT ABDOMEN AND PELVIS WITH IV CONTRAST      HISTORY: Upper abdominal pain, elevated LFTs     TECHNIQUE: Radiation dose reduction techniques were utilized, including  automated exposure control and exposure modulation based on body size.  Axial images were obtained through the abdomen and pelvis after the  administration of IV contrast. Coronal and sagittal reformatted images  obtained.     COMPARISON: 08/07/2022     FINDINGS:      ABDOMEN:  Lung bases are clear. Tiny cysts are again seen in the inferior liver.  The gallbladder is filled with numerous gallstones, which are much  better appreciated on the comparison MRI. Trace amount of  pericholecystic fluid. Also on the prior MRI gallstones are demonstrated  within the common bile duct, which are relatively occult on this  examination. There is no evidence of intrahepatic biliary ductal  dilation. Spleen is unremarkable. Small bilateral renal cysts. The  adrenal glands are unremarkable. Tiny cystic lesion in the pancreatic  head is again noted.     PELVIS:  The bladder is unremarkable. No free fluid in the pelvis. Sigmoid  diverticulosis without evidence for diverticulitis. Appendix is normal.  Bone windows show severe scoliosis       Impression:      Cholelithiasis and choledocholithiasis. Gallstones are not well  appreciated on CT, however are much better demonstrated on the recent  comparison MRI. There is a trace amount of pericholecystic fluid which  may reflect cholecystitis. There is no evidence for biliary ductal  dilation.     Radiation dose reduction techniques were utilized, including automated  exposure control and exposure modulation based on body size.     This report was finalized on 12/6/2022 1:51 PM by Dr. Agustin Monahan M.D.               Assessment:  Active Hospital Problems    Diagnosis  POA   • **Choledocholithiasis [K80.50]  Yes      Resolved Hospital Problems   No resolved problems to display.   abdominal pain  Hyperglycemia  Hyponatremia  Hypertension      Plan:  Will continue  antibiotics  Pain control  Iv fluids  Ercp planned for tomorrow  Lap cesar planned for the following day  DYobaniw patient and ED provider as well as nurse  Clear liquids till midnight    Gloria Wolfe MD  12/6/2022  20:04 EST

## 2022-12-07 NOTE — PROGRESS NOTES
Name: Yeyo Abebe ADMIT: 2022   : 1950  PCP: Bird Hernandez DO    MRN: [id number] LOS: 1 days   AGE/SEX: 72 y.o. male  ROOM: [facility]     Subjective   Subjective   Patient is lying on the bed and denies any nausea, vomiting, abdominal pain, chest pain, shortness of breath.       Objective   Objective   Vital Signs  Temp:  [97 °F (36.1 °C)-97.5 °F (36.4 °C)] 97.5 °F (36.4 °C)  Heart Rate:  [60-93] 64  Resp:  [15-18] 18  BP: (105-151)/() 139/82  SpO2:  [95 %-98 %] 97 %  on   ;   Device (Oxygen Therapy): room air  Body mass index is 23.73 kg/m².  Physical Exam  Constitutional:       General: He is not in acute distress.  HENT:      Head: Normocephalic and atraumatic.      Nose: Nose normal.      Mouth/Throat:      Mouth: Mucous membranes are dry.   Eyes:      Extraocular Movements: Extraocular movements intact.      Pupils: Pupils are equal, round, and reactive to light.   Cardiovascular:      Rate and Rhythm: Normal rate and regular rhythm.      Pulses: Normal pulses.      Heart sounds: Normal heart sounds.   Pulmonary:      Effort: Pulmonary effort is normal.      Breath sounds: Normal breath sounds.   Abdominal:      General: Bowel sounds are normal. There is no distension.      Palpations: Abdomen is soft.      Tenderness: There is no abdominal tenderness.   Musculoskeletal:      Cervical back: Normal range of motion and neck supple.      Right lower leg: No edema.      Left lower leg: No edema.   Skin:     General: Skin is warm and dry.      Coloration: Skin is not jaundiced.   Neurological:      General: No focal deficit present.      Mental Status: He is alert and oriented to person, place, and time.   Psychiatric:         Mood and Affect: Mood normal.         Behavior: Behavior normal.       Results Review     I reviewed the patient's new clinical results.  Results from last 7 days   Lab Units 22  0632 22  0656   WBC 10*3/mm3 7.09 11.20*   HEMOGLOBIN g/dL 14.3 14.5   PLATELETS  10*3/mm3 192 186     Results from last 7 days   Lab Units 12/07/22  0632 12/06/22  0656   SODIUM mmol/L 128* 130*   POTASSIUM mmol/L 4.3 4.0   CHLORIDE mmol/L 91* 90*   CO2 mmol/L 30.7* 31.0*   BUN mg/dL 8 8   CREATININE mg/dL 0.59* 0.74*   GLUCOSE mg/dL 66 127*   EGFR mL/min/1.73 102.4 95.7     Results from last 7 days   Lab Units 12/06/22  0656   ALBUMIN g/dL 4.20   BILIRUBIN mg/dL 1.6*   ALK PHOS U/L 167*   AST (SGOT) U/L 314*   ALT (SGPT) U/L 448*     Results from last 7 days   Lab Units 12/07/22  0632 12/06/22  0656   CALCIUM mg/dL 9.2 9.6   ALBUMIN g/dL  --  4.20     Results from last 7 days   Lab Units 12/06/22  0656   LACTATE mmol/L 1.7     No results found for: HGBA1C, POCGLU    CT Abdomen Pelvis With Contrast    Result Date: 12/6/2022  Cholelithiasis and choledocholithiasis. Gallstones are not well appreciated on CT, however are much better demonstrated on the recent comparison MRI. There is a trace amount of pericholecystic fluid which may reflect cholecystitis. There is no evidence for biliary ductal dilation.  Radiation dose reduction techniques were utilized, including automated exposure control and exposure modulation based on body size.  This report was finalized on 12/6/2022 1:51 PM by Dr. Agustin Monahan M.D.      Scheduled Medications  amLODIPine, 5 mg, Oral, Daily  atorvastatin, 20 mg, Oral, Daily  levETIRAcetam, 500 mg, Oral, BID  metoprolol tartrate, 75 mg, Oral, Q12H  pantoprazole, 40 mg, Oral, QAM  sodium chloride, 10 mL, Intravenous, Q12H  sodium chloride, 1 g, Oral, BID With Meals  tamsulosin, 0.4 mg, Oral, Daily    Infusions  sodium chloride, 75 mL/hr, Last Rate: 75 mL/hr (12/06/22 2232)  sodium chloride, 30 mL/hr, Last Rate: Stopped (12/07/22 1148)    Diet  NPO Diet NPO Type: Strict NPO  NPO Diet NPO Type: Sips with Meds       Assessment/Plan     Active Hospital Problems    Diagnosis  POA   • **Choledocholithiasis [K80.50]  Yes      Resolved Hospital Problems   No resolved problems to  display.       72 y.o. male admitted with Choledocholithiasis.    #1 CBD stone, patient is nuclear undergo ERCP for stone removal.  Continue with fluids and analgesics.  Gastroenterology-surgical team is following along.  2.  Hypertension, on metoprolol and amlodipine anteroperipheral reasonably well controlled.  3.  History of BPH, on Flomax.  4.  History of seizures, on Keppra.  5.  On SCDs for DVT prophylaxis.  6.  CODE STATUS is full code.    Frank Slaughter MD  [facility]  12/07/22  16:12 EST

## 2022-12-07 NOTE — ANESTHESIA PROCEDURE NOTES
Airway  Date/Time: 12/7/2022 10:21 AM    Final Airway Details  Final airway type: endotracheal airway      Successful airway: ETT  Cuffed: yes   Successful intubation technique: video laryngoscopy  Facilitating devices/methods: intubating stylet  Blade: CMAC  Blade size: D  ETT size (mm): 7.0  Cormack-Lehane Classification: grade I - full view of glottis  Placement verified by: capnometry   Cuff volume (mL): 10  Number of attempts at approach: 1  Assessment: lips, teeth, and gum same as pre-op and atraumatic intubation

## 2022-12-07 NOTE — PROGRESS NOTES
CC: Cholelithiasis, choledocholithiasis    S: No events overnight.  ERCP was performed with stone removal and sphincterotomy.  He is feeling fine.  Denies any complaint    O:   Vitals:    12/07/22 1154 12/07/22 1202 12/07/22 1246 12/07/22 1626   BP: 113/62 129/70 139/82 116/77   BP Location: Right arm Right arm Right arm Left arm   Patient Position: Lying Lying Sitting Sitting   Pulse: 61 60 64 72   Resp: 16 16 18 18   Temp:   97.5 °F (36.4 °C) 98.3 °F (36.8 °C)   TempSrc:   Oral Oral   SpO2: 98% 97% 97% 97%   Height:         Alert, no acute distress  Breathing comfortable  Abdomen soft, nontender nondistended    While blood cell count 7, hemoglobin 14.3, platelets 182  Unfortunately no LFTs were drawn today    Assessment and plan    72-year-old male with cholelithiasis, choledocholithiasis status post ERCP.  We will plan for laparoscopic cholecystectomy with intraoperative cholangiogram tomorrow.  Risk and benefits of procedure including bleeding, infection, biliary tree injury, bile leak discussed in detail with the patient that verbalized understanding and agreed with the plan    N.p.o. after midnight, consent

## 2022-12-07 NOTE — PLAN OF CARE
Goal Outcome Evaluation: Pt is alert. Room air. Sitting up in the chair watching T.v. Bowel sound hypoactive. Currently on a clear liquid diet.  ERCP done today, pt tolerating well sitting up in chair. Consent signed and NPO at midnight

## 2022-12-07 NOTE — ANESTHESIA POSTPROCEDURE EVALUATION
Patient: Yeyo Abebe    Procedure Summary     Date: 12/07/22 Room / [facility]:  [facility]    Anesthesia Start: 1011 Anesthesia Stop: 1145    Procedures:       ESOPHAGOGASTRODUODENOSCOPY with biopsies (Esophagus)      ENDOSCOPIC RETROGRADE CHOLANGIOPANCREATOGRAPHY with stone removal Diagnosis:       Choledocholithiasis      (Choledocholithiasis [K80.50])    Surgeons: Yimi Cuellar MD Provider: Laisha Dubois MD    Anesthesia Type: general ASA Status: 3          Anesthesia Type: general    Vitals  Vitals Value Taken Time   /102 12/07/22 1140   Temp     Pulse 63 12/07/22 1140   Resp 16 12/07/22 1140   SpO2 97 % 12/07/22 1140           Post Anesthesia Care and Evaluation    Patient location during evaluation: bedside  Patient participation: complete - patient participated  Level of consciousness: awake  Pain management: adequate    Airway patency: patent  Anesthetic complications: No anesthetic complications    Cardiovascular status: acceptable  Respiratory status: acceptable  Hydration status: acceptable

## 2022-12-08 ENCOUNTER — ANESTHESIA (OUTPATIENT)
Dept: PERIOP | Facility: HOSPITAL | Age: 72
End: 2022-12-08

## 2022-12-08 ENCOUNTER — ANESTHESIA EVENT (OUTPATIENT)
Dept: PERIOP | Facility: HOSPITAL | Age: 72
End: 2022-12-08

## 2022-12-08 ENCOUNTER — APPOINTMENT (OUTPATIENT)
Dept: GENERAL RADIOLOGY | Facility: HOSPITAL | Age: 72
End: 2022-12-08

## 2022-12-08 PROBLEM — R56.9 SEIZURE (HCC): Status: ACTIVE | Noted: 2022-12-08

## 2022-12-08 PROBLEM — K80.50 CHOLEDOCHOLITHIASIS: Status: RESOLVED | Noted: 2022-12-06 | Resolved: 2022-12-08

## 2022-12-08 LAB
ALBUMIN SERPL-MCNC: 3.3 G/DL (ref 3.5–5.2)
ALBUMIN/GLOB SERPL: 1.3 G/DL
ALP SERPL-CCNC: 138 U/L (ref 39–117)
ALT SERPL W P-5'-P-CCNC: 246 U/L (ref 1–41)
ANION GAP SERPL CALCULATED.3IONS-SCNC: 9.6 MMOL/L (ref 5–15)
AST SERPL-CCNC: 68 U/L (ref 1–40)
BASOPHILS # BLD AUTO: 0 10*3/MM3 (ref 0–0.2)
BASOPHILS NFR BLD AUTO: 0 % (ref 0–1.5)
BILIRUB SERPL-MCNC: 1.1 MG/DL (ref 0–1.2)
BUN SERPL-MCNC: 12 MG/DL (ref 8–23)
BUN/CREAT SERPL: 18.2 (ref 7–25)
CALCIUM SPEC-SCNC: 8.3 MG/DL (ref 8.6–10.5)
CHLORIDE SERPL-SCNC: 95 MMOL/L (ref 98–107)
CO2 SERPL-SCNC: 26.4 MMOL/L (ref 22–29)
CREAT SERPL-MCNC: 0.66 MG/DL (ref 0.76–1.27)
DEPRECATED RDW RBC AUTO: 39.6 FL (ref 37–54)
EGFRCR SERPLBLD CKD-EPI 2021: 99 ML/MIN/1.73
EOSINOPHIL # BLD AUTO: 0 10*3/MM3 (ref 0–0.4)
EOSINOPHIL NFR BLD AUTO: 0 % (ref 0.3–6.2)
ERYTHROCYTE [DISTWIDTH] IN BLOOD BY AUTOMATED COUNT: 11.6 % (ref 12.3–15.4)
GLOBULIN UR ELPH-MCNC: 2.5 GM/DL
GLUCOSE SERPL-MCNC: 77 MG/DL (ref 65–99)
HCT VFR BLD AUTO: 35.6 % (ref 37.5–51)
HGB BLD-MCNC: 11.7 G/DL (ref 13–17.7)
IMM GRANULOCYTES # BLD AUTO: 0.04 10*3/MM3 (ref 0–0.05)
IMM GRANULOCYTES NFR BLD AUTO: 0.5 % (ref 0–0.5)
LAB AP CASE REPORT: NORMAL
LYMPHOCYTES # BLD AUTO: 0.65 10*3/MM3 (ref 0.7–3.1)
LYMPHOCYTES NFR BLD AUTO: 7.7 % (ref 19.6–45.3)
MCH RBC QN AUTO: 31 PG (ref 26.6–33)
MCHC RBC AUTO-ENTMCNC: 32.9 G/DL (ref 31.5–35.7)
MCV RBC AUTO: 94.4 FL (ref 79–97)
MONOCYTES # BLD AUTO: 0.69 10*3/MM3 (ref 0.1–0.9)
MONOCYTES NFR BLD AUTO: 8.2 % (ref 5–12)
NEUTROPHILS NFR BLD AUTO: 7.01 10*3/MM3 (ref 1.7–7)
NEUTROPHILS NFR BLD AUTO: 83.6 % (ref 42.7–76)
NRBC BLD AUTO-RTO: 0 /100 WBC (ref 0–0.2)
PATH REPORT.FINAL DX SPEC: NORMAL
PATH REPORT.GROSS SPEC: NORMAL
PLATELET # BLD AUTO: 162 10*3/MM3 (ref 140–450)
PMV BLD AUTO: 10.2 FL (ref 6–12)
POTASSIUM SERPL-SCNC: 3.9 MMOL/L (ref 3.5–5.2)
PROT SERPL-MCNC: 5.8 G/DL (ref 6–8.5)
RBC # BLD AUTO: 3.77 10*6/MM3 (ref 4.14–5.8)
SODIUM SERPL-SCNC: 131 MMOL/L (ref 136–145)
WBC NRBC COR # BLD: 8.39 10*3/MM3 (ref 3.4–10.8)

## 2022-12-08 PROCEDURE — 25010000002 PROPOFOL 10 MG/ML EMULSION: Performed by: ANESTHESIOLOGY

## 2022-12-08 PROCEDURE — 25010000002 IOPAMIDOL 61 % SOLUTION: Performed by: SURGERY

## 2022-12-08 PROCEDURE — 0DJ68ZZ INSPECTION OF STOMACH, VIA NATURAL OR ARTIFICIAL OPENING ENDOSCOPIC: ICD-10-PCS | Performed by: SURGERY

## 2022-12-08 PROCEDURE — 99232 SBSQ HOSP IP/OBS MODERATE 35: CPT

## 2022-12-08 PROCEDURE — 0FT44ZZ RESECTION OF GALLBLADDER, PERCUTANEOUS ENDOSCOPIC APPROACH: ICD-10-PCS | Performed by: SURGERY

## 2022-12-08 PROCEDURE — 25010000002 FENTANYL CITRATE (PF) 50 MCG/ML SOLUTION: Performed by: ANESTHESIOLOGY

## 2022-12-08 PROCEDURE — 25010000002 HYDROMORPHONE PER 4 MG: Performed by: ANESTHESIOLOGY

## 2022-12-08 PROCEDURE — 25010000002 DEXAMETHASONE PER 1 MG: Performed by: ANESTHESIOLOGY

## 2022-12-08 PROCEDURE — BF101ZZ FLUOROSCOPY OF BILE DUCTS USING LOW OSMOLAR CONTRAST: ICD-10-PCS | Performed by: SURGERY

## 2022-12-08 PROCEDURE — 25010000002 HYDROMORPHONE 1 MG/ML SOLUTION: Performed by: ANESTHESIOLOGY

## 2022-12-08 PROCEDURE — 80053 COMPREHEN METABOLIC PANEL: CPT | Performed by: INTERNAL MEDICINE

## 2022-12-08 PROCEDURE — 25010000002 CEFAZOLIN IN DEXTROSE 2-4 GM/100ML-% SOLUTION: Performed by: SURGERY

## 2022-12-08 PROCEDURE — 88304 TISSUE EXAM BY PATHOLOGIST: CPT | Performed by: SURGERY

## 2022-12-08 PROCEDURE — 25010000002 ONDANSETRON PER 1 MG: Performed by: ANESTHESIOLOGY

## 2022-12-08 PROCEDURE — 74300 X-RAY BILE DUCTS/PANCREAS: CPT

## 2022-12-08 PROCEDURE — 85025 COMPLETE CBC W/AUTO DIFF WBC: CPT | Performed by: INTERNAL MEDICINE

## 2022-12-08 PROCEDURE — 25010000002 KETOROLAC TROMETHAMINE PER 15 MG: Performed by: ANESTHESIOLOGY

## 2022-12-08 PROCEDURE — 47563 LAPARO CHOLECYSTECTOMY/GRAPH: CPT | Performed by: SURGERY

## 2022-12-08 DEVICE — LARGE LIGATION CLIPS 6 CLIPS/CART
Type: IMPLANTABLE DEVICE | Site: ABDOMEN | Status: FUNCTIONAL
Brand: VAS-Q-CLIP

## 2022-12-08 DEVICE — ARISTA AH ABSORBABLE HEMOSTATIC PARTICLES
Type: IMPLANTABLE DEVICE | Site: ABDOMEN | Status: FUNCTIONAL
Brand: ARISTA™ AH

## 2022-12-08 DEVICE — MEDIUM-LARGE LIGATION CLIPS 6 CLIPS/CART
Type: IMPLANTABLE DEVICE | Site: ABDOMEN | Status: FUNCTIONAL
Brand: VAS-Q-CLIP

## 2022-12-08 DEVICE — HORIZON TI ML 6 CLIPS/CART
Type: IMPLANTABLE DEVICE | Site: ABDOMEN | Status: FUNCTIONAL
Brand: WECK

## 2022-12-08 RX ORDER — OXYCODONE HYDROCHLORIDE AND ACETAMINOPHEN 5; 325 MG/1; MG/1
1 TABLET ORAL EVERY 6 HOURS PRN
Status: DISCONTINUED | OUTPATIENT
Start: 2022-12-08 | End: 2022-12-09 | Stop reason: HOSPADM

## 2022-12-08 RX ORDER — SODIUM CHLORIDE 9 MG/ML
40 INJECTION, SOLUTION INTRAVENOUS AS NEEDED
Status: DISCONTINUED | OUTPATIENT
Start: 2022-12-08 | End: 2022-12-08 | Stop reason: HOSPADM

## 2022-12-08 RX ORDER — DIPHENHYDRAMINE HYDROCHLORIDE 50 MG/ML
12.5 INJECTION INTRAMUSCULAR; INTRAVENOUS
Status: DISCONTINUED | OUTPATIENT
Start: 2022-12-08 | End: 2022-12-08 | Stop reason: HOSPADM

## 2022-12-08 RX ORDER — HYDROMORPHONE HYDROCHLORIDE 1 MG/ML
0.5 INJECTION, SOLUTION INTRAMUSCULAR; INTRAVENOUS; SUBCUTANEOUS
Status: DISCONTINUED | OUTPATIENT
Start: 2022-12-08 | End: 2022-12-08 | Stop reason: HOSPADM

## 2022-12-08 RX ORDER — SODIUM CHLORIDE, SODIUM LACTATE, POTASSIUM CHLORIDE, CALCIUM CHLORIDE 600; 310; 30; 20 MG/100ML; MG/100ML; MG/100ML; MG/100ML
9 INJECTION, SOLUTION INTRAVENOUS CONTINUOUS
Status: DISCONTINUED | OUTPATIENT
Start: 2022-12-08 | End: 2022-12-08

## 2022-12-08 RX ORDER — GLYCOPYRROLATE 0.2 MG/ML
INJECTION INTRAMUSCULAR; INTRAVENOUS AS NEEDED
Status: DISCONTINUED | OUTPATIENT
Start: 2022-12-08 | End: 2022-12-08 | Stop reason: SURG

## 2022-12-08 RX ORDER — KETOROLAC TROMETHAMINE 30 MG/ML
INJECTION, SOLUTION INTRAMUSCULAR; INTRAVENOUS AS NEEDED
Status: DISCONTINUED | OUTPATIENT
Start: 2022-12-08 | End: 2022-12-08 | Stop reason: SURG

## 2022-12-08 RX ORDER — PROMETHAZINE HYDROCHLORIDE 25 MG/1
25 SUPPOSITORY RECTAL ONCE AS NEEDED
Status: DISCONTINUED | OUTPATIENT
Start: 2022-12-08 | End: 2022-12-08 | Stop reason: HOSPADM

## 2022-12-08 RX ORDER — ROCURONIUM BROMIDE 10 MG/ML
INJECTION, SOLUTION INTRAVENOUS AS NEEDED
Status: DISCONTINUED | OUTPATIENT
Start: 2022-12-08 | End: 2022-12-08 | Stop reason: SURG

## 2022-12-08 RX ORDER — BUPIVACAINE HYDROCHLORIDE AND EPINEPHRINE 5; 5 MG/ML; UG/ML
INJECTION, SOLUTION EPIDURAL; INTRACAUDAL; PERINEURAL AS NEEDED
Status: DISCONTINUED | OUTPATIENT
Start: 2022-12-08 | End: 2022-12-08 | Stop reason: HOSPADM

## 2022-12-08 RX ORDER — MAGNESIUM HYDROXIDE 1200 MG/15ML
LIQUID ORAL AS NEEDED
Status: DISCONTINUED | OUTPATIENT
Start: 2022-12-08 | End: 2022-12-08 | Stop reason: HOSPADM

## 2022-12-08 RX ORDER — LIDOCAINE HYDROCHLORIDE 20 MG/ML
INJECTION, SOLUTION INFILTRATION; PERINEURAL AS NEEDED
Status: DISCONTINUED | OUTPATIENT
Start: 2022-12-08 | End: 2022-12-08 | Stop reason: SURG

## 2022-12-08 RX ORDER — FENTANYL CITRATE 50 UG/ML
50 INJECTION, SOLUTION INTRAMUSCULAR; INTRAVENOUS
Status: DISCONTINUED | OUTPATIENT
Start: 2022-12-08 | End: 2022-12-08 | Stop reason: HOSPADM

## 2022-12-08 RX ORDER — FENTANYL CITRATE 50 UG/ML
INJECTION, SOLUTION INTRAMUSCULAR; INTRAVENOUS AS NEEDED
Status: DISCONTINUED | OUTPATIENT
Start: 2022-12-08 | End: 2022-12-08 | Stop reason: SURG

## 2022-12-08 RX ORDER — OXYCODONE AND ACETAMINOPHEN 7.5; 325 MG/1; MG/1
1 TABLET ORAL EVERY 4 HOURS PRN
Status: DISCONTINUED | OUTPATIENT
Start: 2022-12-08 | End: 2022-12-08 | Stop reason: HOSPADM

## 2022-12-08 RX ORDER — MIDAZOLAM HYDROCHLORIDE 1 MG/ML
0.5 INJECTION INTRAMUSCULAR; INTRAVENOUS
Status: DISCONTINUED | OUTPATIENT
Start: 2022-12-08 | End: 2022-12-08 | Stop reason: HOSPADM

## 2022-12-08 RX ORDER — SODIUM CHLORIDE 0.9 % (FLUSH) 0.9 %
3-10 SYRINGE (ML) INJECTION AS NEEDED
Status: DISCONTINUED | OUTPATIENT
Start: 2022-12-08 | End: 2022-12-08 | Stop reason: HOSPADM

## 2022-12-08 RX ORDER — ONDANSETRON 2 MG/ML
4 INJECTION INTRAMUSCULAR; INTRAVENOUS ONCE AS NEEDED
Status: DISCONTINUED | OUTPATIENT
Start: 2022-12-08 | End: 2022-12-08 | Stop reason: HOSPADM

## 2022-12-08 RX ORDER — NALOXONE HCL 0.4 MG/ML
0.2 VIAL (ML) INJECTION AS NEEDED
Status: DISCONTINUED | OUTPATIENT
Start: 2022-12-08 | End: 2022-12-08 | Stop reason: HOSPADM

## 2022-12-08 RX ORDER — LIDOCAINE HYDROCHLORIDE 10 MG/ML
0.5 INJECTION, SOLUTION EPIDURAL; INFILTRATION; INTRACAUDAL; PERINEURAL ONCE AS NEEDED
Status: DISCONTINUED | OUTPATIENT
Start: 2022-12-08 | End: 2022-12-08 | Stop reason: HOSPADM

## 2022-12-08 RX ORDER — DEXAMETHASONE SODIUM PHOSPHATE 10 MG/ML
INJECTION INTRAMUSCULAR; INTRAVENOUS AS NEEDED
Status: DISCONTINUED | OUTPATIENT
Start: 2022-12-08 | End: 2022-12-08 | Stop reason: SURG

## 2022-12-08 RX ORDER — HYDRALAZINE HYDROCHLORIDE 20 MG/ML
5 INJECTION INTRAMUSCULAR; INTRAVENOUS
Status: DISCONTINUED | OUTPATIENT
Start: 2022-12-08 | End: 2022-12-08 | Stop reason: HOSPADM

## 2022-12-08 RX ORDER — LABETALOL HYDROCHLORIDE 5 MG/ML
5 INJECTION, SOLUTION INTRAVENOUS
Status: DISCONTINUED | OUTPATIENT
Start: 2022-12-08 | End: 2022-12-08 | Stop reason: HOSPADM

## 2022-12-08 RX ORDER — SODIUM CHLORIDE 0.9 % (FLUSH) 0.9 %
1-10 SYRINGE (ML) INJECTION AS NEEDED
Status: DISCONTINUED | OUTPATIENT
Start: 2022-12-08 | End: 2022-12-08 | Stop reason: HOSPADM

## 2022-12-08 RX ORDER — EPHEDRINE SULFATE 50 MG/ML
5 INJECTION, SOLUTION INTRAVENOUS ONCE AS NEEDED
Status: DISCONTINUED | OUTPATIENT
Start: 2022-12-08 | End: 2022-12-08 | Stop reason: HOSPADM

## 2022-12-08 RX ORDER — FLUMAZENIL 0.1 MG/ML
0.2 INJECTION INTRAVENOUS AS NEEDED
Status: DISCONTINUED | OUTPATIENT
Start: 2022-12-08 | End: 2022-12-08 | Stop reason: HOSPADM

## 2022-12-08 RX ORDER — SODIUM CHLORIDE 0.9 % (FLUSH) 0.9 %
10 SYRINGE (ML) INJECTION EVERY 12 HOURS SCHEDULED
Status: DISCONTINUED | OUTPATIENT
Start: 2022-12-08 | End: 2022-12-08 | Stop reason: HOSPADM

## 2022-12-08 RX ORDER — PROPOFOL 10 MG/ML
VIAL (ML) INTRAVENOUS AS NEEDED
Status: DISCONTINUED | OUTPATIENT
Start: 2022-12-08 | End: 2022-12-08 | Stop reason: SURG

## 2022-12-08 RX ORDER — PROMETHAZINE HYDROCHLORIDE 25 MG/1
25 TABLET ORAL ONCE AS NEEDED
Status: DISCONTINUED | OUTPATIENT
Start: 2022-12-08 | End: 2022-12-08 | Stop reason: HOSPADM

## 2022-12-08 RX ORDER — FAMOTIDINE 10 MG/ML
20 INJECTION, SOLUTION INTRAVENOUS ONCE
Status: DISCONTINUED | OUTPATIENT
Start: 2022-12-08 | End: 2022-12-08 | Stop reason: HOSPADM

## 2022-12-08 RX ORDER — SODIUM CHLORIDE 9 MG/ML
INJECTION, SOLUTION INTRAVENOUS AS NEEDED
Status: DISCONTINUED | OUTPATIENT
Start: 2022-12-08 | End: 2022-12-08 | Stop reason: HOSPADM

## 2022-12-08 RX ORDER — CEFAZOLIN SODIUM 2 G/100ML
2 INJECTION, SOLUTION INTRAVENOUS ONCE
Status: COMPLETED | OUTPATIENT
Start: 2022-12-08 | End: 2022-12-08

## 2022-12-08 RX ORDER — DIPHENHYDRAMINE HCL 25 MG
25 CAPSULE ORAL
Status: DISCONTINUED | OUTPATIENT
Start: 2022-12-08 | End: 2022-12-08 | Stop reason: HOSPADM

## 2022-12-08 RX ORDER — ONDANSETRON 2 MG/ML
INJECTION INTRAMUSCULAR; INTRAVENOUS AS NEEDED
Status: DISCONTINUED | OUTPATIENT
Start: 2022-12-08 | End: 2022-12-08 | Stop reason: SURG

## 2022-12-08 RX ORDER — AMOXICILLIN 250 MG
1 CAPSULE ORAL 2 TIMES DAILY PRN
Status: DISCONTINUED | OUTPATIENT
Start: 2022-12-08 | End: 2022-12-09 | Stop reason: HOSPADM

## 2022-12-08 RX ORDER — SODIUM CHLORIDE 0.9 % (FLUSH) 0.9 %
3 SYRINGE (ML) INJECTION EVERY 12 HOURS SCHEDULED
Status: DISCONTINUED | OUTPATIENT
Start: 2022-12-08 | End: 2022-12-08 | Stop reason: HOSPADM

## 2022-12-08 RX ORDER — HYDROCODONE BITARTRATE AND ACETAMINOPHEN 7.5; 325 MG/1; MG/1
1 TABLET ORAL ONCE AS NEEDED
Status: DISCONTINUED | OUTPATIENT
Start: 2022-12-08 | End: 2022-12-08 | Stop reason: HOSPADM

## 2022-12-08 RX ADMIN — CEFAZOLIN SODIUM 2 G: 2 INJECTION, SOLUTION INTRAVENOUS at 11:36

## 2022-12-08 RX ADMIN — PROPOFOL 150 MG: 10 INJECTION, EMULSION INTRAVENOUS at 11:51

## 2022-12-08 RX ADMIN — TAMSULOSIN HYDROCHLORIDE 0.4 MG: 0.4 CAPSULE ORAL at 09:03

## 2022-12-08 RX ADMIN — SODIUM CHLORIDE TAB 1 GM 1 G: 1 TAB at 09:04

## 2022-12-08 RX ADMIN — ONDANSETRON 4 MG: 2 INJECTION INTRAMUSCULAR; INTRAVENOUS at 13:57

## 2022-12-08 RX ADMIN — GLYCOPYRROLATE 0.3 MG: 0.2 INJECTION INTRAMUSCULAR; INTRAVENOUS at 11:51

## 2022-12-08 RX ADMIN — METOPROLOL TARTRATE 75 MG: 25 TABLET, FILM COATED ORAL at 09:03

## 2022-12-08 RX ADMIN — AMLODIPINE BESYLATE 5 MG: 5 TABLET ORAL at 09:04

## 2022-12-08 RX ADMIN — LIDOCAINE HYDROCHLORIDE 100 MG: 20 INJECTION, SOLUTION INFILTRATION; PERINEURAL at 11:51

## 2022-12-08 RX ADMIN — SODIUM CHLORIDE, POTASSIUM CHLORIDE, SODIUM LACTATE AND CALCIUM CHLORIDE 9 ML/HR: 600; 310; 30; 20 INJECTION, SOLUTION INTRAVENOUS at 11:19

## 2022-12-08 RX ADMIN — FENTANYL CITRATE 50 MCG: 0.05 INJECTION, SOLUTION INTRAMUSCULAR; INTRAVENOUS at 13:35

## 2022-12-08 RX ADMIN — KETOROLAC TROMETHAMINE 30 MG: 30 INJECTION, SOLUTION INTRAMUSCULAR at 13:57

## 2022-12-08 RX ADMIN — PANTOPRAZOLE SODIUM 40 MG: 40 TABLET, DELAYED RELEASE ORAL at 07:53

## 2022-12-08 RX ADMIN — ACETAMINOPHEN 650 MG: 325 TABLET ORAL at 04:13

## 2022-12-08 RX ADMIN — FENTANYL CITRATE 50 MCG: 0.05 INJECTION, SOLUTION INTRAMUSCULAR; INTRAVENOUS at 14:35

## 2022-12-08 RX ADMIN — SUGAMMADEX 200 MG: 100 INJECTION, SOLUTION INTRAVENOUS at 13:56

## 2022-12-08 RX ADMIN — METOPROLOL TARTRATE 75 MG: 25 TABLET, FILM COATED ORAL at 20:33

## 2022-12-08 RX ADMIN — ROCURONIUM BROMIDE 10 MG: 50 INJECTION INTRAVENOUS at 13:35

## 2022-12-08 RX ADMIN — ACETAMINOPHEN 650 MG: 325 TABLET ORAL at 16:51

## 2022-12-08 RX ADMIN — ATORVASTATIN CALCIUM 20 MG: 20 TABLET, FILM COATED ORAL at 09:03

## 2022-12-08 RX ADMIN — DEXAMETHASONE SODIUM PHOSPHATE 8 MG: 10 INJECTION INTRAMUSCULAR; INTRAVENOUS at 11:51

## 2022-12-08 RX ADMIN — ROCURONIUM BROMIDE 50 MG: 50 INJECTION INTRAVENOUS at 11:51

## 2022-12-08 RX ADMIN — LEVETIRACETAM 500 MG: 500 TABLET, FILM COATED ORAL at 09:03

## 2022-12-08 RX ADMIN — OXYCODONE AND ACETAMINOPHEN 1 TABLET: 5; 325 TABLET ORAL at 22:35

## 2022-12-08 RX ADMIN — HYDROMORPHONE HYDROCHLORIDE 0.5 MG: 1 INJECTION, SOLUTION INTRAMUSCULAR; INTRAVENOUS; SUBCUTANEOUS at 14:06

## 2022-12-08 RX ADMIN — LEVETIRACETAM 500 MG: 500 TABLET, FILM COATED ORAL at 22:06

## 2022-12-08 RX ADMIN — HYDROMORPHONE HYDROCHLORIDE 0.5 MG: 1 INJECTION, SOLUTION INTRAMUSCULAR; INTRAVENOUS; SUBCUTANEOUS at 14:47

## 2022-12-08 RX ADMIN — SODIUM CHLORIDE TAB 1 GM 1 G: 1 TAB at 17:52

## 2022-12-08 RX ADMIN — FAMOTIDINE 20 MG: 10 INJECTION INTRAVENOUS at 11:19

## 2022-12-08 RX ADMIN — SODIUM CHLORIDE, POTASSIUM CHLORIDE, SODIUM LACTATE AND CALCIUM CHLORIDE: 600; 310; 30; 20 INJECTION, SOLUTION INTRAVENOUS at 13:35

## 2022-12-08 NOTE — ANESTHESIA POSTPROCEDURE EVALUATION
"Patient: Yeyo Abebe    Procedure Summary     Date: 12/08/22 Room / Location: [facility] OR [address] [place] [address] MAIN OR    Anesthesia Start: 1143 Anesthesia Stop: 1416    Procedure: CHOLECYSTECTOMY LAPAROSCOPIC INTRAOPERATIVE CHOLANGIOGRAM, WITH UPPER ENDOSCOPY (Abdomen) Diagnosis:       Choledocholithiasis      (Choledocholithiasis [K80.50])    Surgeons: Milton Seth MD Provider: Jerry Nick MD    Anesthesia Type: general ASA Status: 3          Anesthesia Type: general    Vitals  Vitals Value Taken Time   /80 12/08/22 1446   Temp 36.6 °C (97.9 °F) 12/08/22 1445   Pulse 71 12/08/22 1458   Resp 14 12/08/22 1445   SpO2 96 % 12/08/22 1458   Vitals shown include unvalidated device data.        Post Anesthesia Care and Evaluation    Patient location during evaluation: bedside  Patient participation: complete - patient participated  Level of consciousness: awake and alert  Pain management: adequate    Airway patency: patent  Anesthetic complications: No anesthetic complications    Cardiovascular status: acceptable  Respiratory status: acceptable  Hydration status: acceptable    Comments: /77 (BP Location: Right arm, Patient Position: Sitting)   Pulse 63   Temp 36.1 °C (97 °F) (Oral)   Resp 18   Ht 167.6 cm (65.98\")   Wt 62 kg (136 lb 11 oz)   SpO2 96%   BMI 22.07 kg/m²     Patient is stable postoperatively and has adequately recovered from anesthesia as described above unless otherwise noted      "

## 2022-12-08 NOTE — PLAN OF CARE
Goal Outcome Evaluation:        A&Ox4, calm and cooperative. TRENT Win completed today, 4 lap sites dermabond CDI. Clear liquid diet, advance as tolerate. Up in chair, c/o mild pain, and gas, VSS, no distress noted. Will cont to monitor throughout remainder of the shift.

## 2022-12-08 NOTE — PROGRESS NOTES
Gastroenterology   Inpatient Progress Note    Reason for Follow Up:  abdominal pain    Subjective  Interval History:     Reports abdominal pain improved without nausea or vomiting.  Denies obstructive jaundice symptoms.  Plans for laparoscopic cholecystectomy today with Dr. Seth.      Current Facility-Administered Medications:   •  acetaminophen (TYLENOL) tablet 650 mg, 650 mg, Oral, Q4H PRN, 650 mg at 12/08/22 0413 **OR** acetaminophen (TYLENOL) 160 MG/5ML solution 650 mg, 650 mg, Oral, Q4H PRN **OR** acetaminophen (TYLENOL) suppository 650 mg, 650 mg, Rectal, Q4H PRN, Yimi Cuellar MD  •  amLODIPine (NORVASC) tablet 5 mg, 5 mg, Oral, Daily, Yimi Cuellar MD, 5 mg at 12/07/22 1343  •  atorvastatin (LIPITOR) tablet 20 mg, 20 mg, Oral, Daily, Yimi Cuellar MD, 20 mg at 12/07/22 1343  •  levETIRAcetam (KEPPRA) tablet 500 mg, 500 mg, Oral, BID, Yimi Cuellar MD, 500 mg at 12/07/22 2132  •  metoprolol tartrate (LOPRESSOR) tablet 75 mg, 75 mg, Oral, Q12H, Yimi Cuellar MD, 75 mg at 12/07/22 2132  •  ondansetron (ZOFRAN) injection 4 mg, 4 mg, Intravenous, Q6H PRN, Yimi Cuellar MD, 4 mg at 12/07/22 1101  •  pantoprazole (PROTONIX) EC tablet 40 mg, 40 mg, Oral, QAM, Yimi Cuellar MD, 40 mg at 12/08/22 0753  •  sodium chloride 0.9 % flush 1-10 mL, 1-10 mL, Intravenous, PRN, Milton Seth MD  •  sodium chloride 0.9 % flush 10 mL, 10 mL, Intravenous, PRN, Yimi Cuellar MD  •  [COMPLETED] Insert Peripheral IV, , , Once **AND** sodium chloride 0.9 % flush 10 mL, 10 mL, Intravenous, PRN, Yimi Cuellar MD, 10 mL at 12/06/22 2201  •  sodium chloride 0.9 % flush 10 mL, 10 mL, Intravenous, Q12H, Milton Seth MD  •  sodium chloride 0.9 % infusion 40 mL, 40 mL, Intravenous, PRN, Milton Seth MD  •  sodium chloride 0.9 % infusion, 75 mL/hr, Intravenous, Continuous, Yimi Cuellar MD, Last Rate: 75 mL/hr at 12/07/22 2230, 75 mL/hr at 12/07/22 2230  •  sodium chloride 0.9 % infusion, 30  mL/hr, Intravenous, Continuous PRN, Yimi Cuellar MD, Stopped at 12/07/22 1148  •  sodium chloride tablet 1 g, 1 g, Oral, BID With Meals, Yimi Cuellar MD, 1 g at 12/07/22 1820  •  tamsulosin (FLOMAX) 24 hr capsule 0.4 mg, 0.4 mg, Oral, Daily, Yimi Cuellar MD, 0.4 mg at 12/07/22 1343  Review of Systems:               The following systems were reviewed and negative;  gastrointestinal    Objective     Vital Signs  Temp:  [97.5 °F (36.4 °C)-99.1 °F (37.3 °C)] 98.7 °F (37.1 °C)  Heart Rate:  [60-79] 78  Resp:  [15-18] 18  BP: (105-139)/() 127/64  Body mass index is 22.07 kg/m².                  General Appearance:  awake, alert, oriented, in no acute distress  Abdomen:  Soft, non-tender, normal bowel sounds; no bruits, organomegaly or masses.                Results Review:                I reviewed the patient's new clinical results.    Results from last 7 days   Lab Units 12/07/22  0632 12/06/22  0656   WBC 10*3/mm3 7.09 11.20*   HEMOGLOBIN g/dL 14.3 14.5   HEMATOCRIT % 41.5 43.5   PLATELETS 10*3/mm3 192 186     Results from last 7 days   Lab Units 12/07/22  0632 12/06/22  0656   SODIUM mmol/L 128* 130*   POTASSIUM mmol/L 4.3 4.0   CHLORIDE mmol/L 91* 90*   CO2 mmol/L 30.7* 31.0*   BUN mg/dL 8 8   CREATININE mg/dL 0.59* 0.74*   CALCIUM mg/dL 9.2 9.6   BILIRUBIN mg/dL  --  1.6*   ALK PHOS U/L  --  167*   ALT (SGPT) U/L  --  448*   AST (SGOT) U/L  --  314*   GLUCOSE mg/dL 66 127*         Lab Results   Lab Value Date/Time    LIPASE 52 12/06/2022 0656       Radiology:  FL ercp biliary duct only   Final Result      CT Abdomen Pelvis With Contrast   Final Result   Cholelithiasis and choledocholithiasis. Gallstones are not well   appreciated on CT, however are much better demonstrated on the recent   comparison MRI. There is a trace amount of pericholecystic fluid which   may reflect cholecystitis. There is no evidence for biliary ductal   dilation.       Radiation dose reduction techniques were utilized, including  automated   exposure control and exposure modulation based on body size.       This report was finalized on 12/6/2022 1:51 PM by Dr. Agustin Monahan M.D.              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 Abdominal pain  2. Elevated LFTs and total bilirubin- improving, elevated T. Bili resolved   3. Choledocholithiasis  4. Cholelithiasis  5. Hypertension  6. Cystic lesion of pancreas (0.8 cm, needs outpatient surveillance imaging)  7. Status post ERCP with sphincterotomy successful sweeping of stones with stent placement on 12/7/2022 with Dr. Cuellar  8. 12/8 EGD remarkable for multiple 2 to 7 mm nonbleeding gastric polyps, gastritis, otherwise unremarkable exam      Plan:  · General surgery plans to proceed with laparoscopic cholecystectomy today  · Monitor for signs of cholangitis  · Monitor CMP  · Keep n.p.o. until cleared by general surgery    I discussed the patients findings and my recommendations with patient and Dr. Soto.          ANAM Meier  [facility] [address] [address]

## 2022-12-08 NOTE — OP NOTE
PREOPERATIVE DIAGNOSIS: Symptomatic Cholelithiasis, choledocholithiasis   POSTOPERATIVE DIAGNOSIS: Symptomatic Cholelithiasis   PROCEDURE:   - Laparoscopic cholecystectomy with Intraoperative cholangiogram  - Flexible esophagogastroscopy    SURGEON/STAFF: MARIAM Seth MD  ASST: Yesi Mcdaniel CSA that was in charge of retraction, exposure, holding camera that was essential for the success of the case  SPECIMENS: Gallbladder.  INTRAOPERATIVE COMPLICATIONS: None.  ANESTHESIA: General.  BLOOD LOSS:  minimal  COUNTS: Needle and sponge counts correct.   FINDINGS: Cholelithiasis, chronic cholecystitis, dilated biliary tree    INDICATIONS: This is a pleasant patient who presented to the hospital with abdominal pain.  He was found to have choledocholithiasis.  He underwent ERCP with sphincterotomy and stent placement.  We discussed with the patient about treatment options and I recommended that he undergoes laparoscopic cholecystectomy with intraoperative cholangiogram.  Risk and benefits of procedure including bleeding, infection, intra-abdominal infection, biliary tree injury discussed in detail with the patient that verbalized understanding and agreed with the plan.    PROCEDURE: The patient was brought to the operating room in stable condition. Perioperative antibiotics were given and sequential compression devices applied. He was then laid supine on the operating room table. General anesthesia was induced by the Anesthesia Service without difficulty.     At this time, the patient's abdomen was accessed at the epigastric area with Optiview technique and insertion of a 5 mm trocar, pneumoperitoneum was insufflated.  Upon exploration of the abdominal cavity there was no evidence of injury from trocar placement.  There were adhesions from the omentum to the anterior abdominal wall.  Another 5 mm trocar was placed in the supraumbilical area and another 2 5 mm trochars were placed in the right upper quadrant.  Initial 5 mm  trocar was switched by an 11 mm trocar.This was under direct vision.     The stomach and duodenum were very distended and anesthesiologist was unable to pass a NG tube.  I decided to perform esophagogastrostomy.  Scope was inserted and initially by anesthesiologist but was unable to be advanced safely.  The endoscope was advanced under direct vision into the stomach and I was distended.  I was able to decompress the stomach and duodenum.  The scope was then withdrawn under direct vision.    The gallbladder was chronically inflamed and thickened.  The peritoneal attachment of the gallbladder at the level of the infundibulum was scored from laterally to medially, terminating at the level of the hepatic edge.   Peritoneum was slowly stripped down.  The gallbladder was laying over and a large common bile duct.  The gallbladder was dissected from the common bile duct.  I then was able to dissect around the cystic duct that was very short and wide.  I was able to recognize the cystic artery and this was circumferentially dissected.  There was a lot of chronic thickened peritoneum.  2 areas were dissected in between clips.  I was then able to recognize cystic duct and cystic artery and both were dissected circumferentially.  I obtained a critical view.    A Hemo-Lock clip was placed distally at the cystic duct and a cystic duct incision with laparoscopic scissors was performed. A 16 Fr sheath was placed through the patient abdominal wall and a Cholangio-catheter was inserted into the patient abdomen. The catheter was secured inside the cystic duct with a metallic clip. Cholangiogram was performed that showed filling of the cystic duct as well as the common bile duct and the right and left hepatic ducts. There’s prompt emptying of the contrast into the duodenum and there’s no evidence of filling defects.  The cystic duct seem to be coming from either the proximal common bile duct of the right hepatic duct. Next, the  metallic clip was removed and the cystic duct was once again visualized and after confirming that it was indeed the cystic duct, it was clipped twice proximally. It was then transected. Next, the cystic artery was clipped twice proximally and once distally. It was inspected and transected with scissors The gallbladder, which was extremely  inflamed, was then carefully dissected off the hepatic bed using hook electrocautery. It was firmly adherent to the underlying bed, and an effort careful and meticulous hemostasis was undertaken.   The gallbladder was intrahepatic and I inadvertently entered the gallbladder.  There was spillage of purulent fluid from the gallbladder as well as stones.  The stones were slowly retrieved.  The gallbladder, after being removed from the hepatic bed, was placed in an EndoCatch bag. It was removed through the epigastric trocar without difficulty.  I then used another Endo Catch bag to remove all the stones.  Irrigation was performed and hemostasis was then achieved.  Dakota hemostatic agent was placed over the gallbladder bed    A final complete survey of the abdominal cavity was undertaken, and there was no evidence of bleeding or intrabdominal injury. The 11 mm trocar was removed and the fascial defect was closed with 0 vicryl and endoclose technique.  At this time all 5-mm trocars in the upper abdomen were then removed under direct vision while desufflating the abdomen. Next, the umbilical trocar was removed. The skin incisions were closed with 4-0 Monocryl and surgical glue. At the end of the case, all needle and sponge counts were correct. I, the attending surgeon, was scrubbed, present and persisted in the entire operation. The patient was extubated and taken to the recovery room in stable condition.    Milton Seth MD

## 2022-12-08 NOTE — ANESTHESIA PREPROCEDURE EVALUATION
Anesthesia Evaluation     Patient summary reviewed and Nursing notes reviewed   no history of anesthetic complications:               Airway   Mallampati: II  TM distance: >3 FB  Neck ROM: full  Small opening and Possible difficult intubation  Comment: Hx iatrogenic vocal cord dysfunction  Dental - normal exam     Pulmonary - negative pulmonary ROS    breath sounds clear to auscultation  (-) shortness of breath, sleep apnea, decreased breath sounds, wheezes  Cardiovascular - normal exam  Exercise tolerance: good (4-7 METS)    Rhythm: regular  Rate: normal    (+) hypertension, CHF Diastolic >=55%, PVD,   (-) past MI, angina, orthopnea, PND, HAAS      Neuro/Psych- negative ROS  (-) seizures, neuromuscular disease, TIA, CVA, dizziness/light headedness, weakness, numbness  GI/Hepatic/Renal/Endo    (+)  GERD,  renal disease,   (-) liver disease, diabetes    Musculoskeletal     (+) back pain, chronic pain,   Abdominal  - normal exam   Substance History - negative use  (-) alcohol use, drug use     OB/GYN negative ob/gyn ROS         Other   arthritis,    history of cancer (prostate) remission                  Anesthesia Plan    ASA 3     general     (I discussed with the patient the relevant risks of general anesthesia including, but not limited to, nausea, vomiting, disorientation, post-op delirium, nerve injury, oral/dental injury, awareness, stroke, and death.  I also reviewed any clinically relevant lab and imaging results.)  intravenous induction     Anesthetic plan, risks, benefits, and alternatives have been provided, discussed and informed consent has been obtained with: patient.        CODE STATUS:    Code Status (Patient has no pulse and is not breathing): CPR (Attempt to Resuscitate)  Medical Interventions (Patient has pulse or is breathing): Full Support

## 2022-12-08 NOTE — ANESTHESIA PROCEDURE NOTES
Airway  Urgency: elective    Date/Time: 12/8/2022 11:53 AM  Airway not difficult    General Information and Staff    Patient location during procedure: OR  Anesthesiologist: Jerry Nick MD    Indications and Patient Condition  Indications for airway management: airway protection    Preoxygenated: yes      Final Airway Details  Final airway type: endotracheal airway      Successful airway: ETT  Cuffed: yes   Successful intubation technique: direct laryngoscopy  Facilitating devices/methods: Bougie and anterior pressure/BURP  Endotracheal tube insertion site: oral  Blade: Tanisha  Blade size: 4  Cormack-Lehane Classification: grade IIb - view of arytenoids or posterior of glottis only  Placement verified by: chest auscultation and capnometry   Measured from: teeth  ETT/EBT  to teeth (cm): 23  Number of attempts at approach: 1  Assessment: lips, teeth, and gum same as pre-op and atraumatic intubation

## 2022-12-09 ENCOUNTER — READMISSION MANAGEMENT (OUTPATIENT)
Dept: CALL CENTER | Facility: HOSPITAL | Age: 72
End: 2022-12-09

## 2022-12-09 VITALS
RESPIRATION RATE: 18 BRPM | WEIGHT: 136.69 LBS | BODY MASS INDEX: 21.97 KG/M2 | HEART RATE: 58 BPM | DIASTOLIC BLOOD PRESSURE: 82 MMHG | TEMPERATURE: 97 F | HEIGHT: 66 IN | SYSTOLIC BLOOD PRESSURE: 135 MMHG | OXYGEN SATURATION: 96 %

## 2022-12-09 LAB
ALBUMIN SERPL-MCNC: 3.4 G/DL (ref 3.5–5.2)
ALBUMIN/GLOB SERPL: 1.2 G/DL
ALP SERPL-CCNC: 128 U/L (ref 39–117)
ALT SERPL W P-5'-P-CCNC: 256 U/L (ref 1–41)
ANION GAP SERPL CALCULATED.3IONS-SCNC: 10.2 MMOL/L (ref 5–15)
AST SERPL-CCNC: 96 U/L (ref 1–40)
BASOPHILS # BLD AUTO: 0.01 10*3/MM3 (ref 0–0.2)
BASOPHILS NFR BLD AUTO: 0.1 % (ref 0–1.5)
BILIRUB SERPL-MCNC: 1.1 MG/DL (ref 0–1.2)
BUN SERPL-MCNC: 8 MG/DL (ref 8–23)
BUN/CREAT SERPL: 16.3 (ref 7–25)
CALCIUM SPEC-SCNC: 8.5 MG/DL (ref 8.6–10.5)
CHLORIDE SERPL-SCNC: 93 MMOL/L (ref 98–107)
CO2 SERPL-SCNC: 27.8 MMOL/L (ref 22–29)
CREAT SERPL-MCNC: 0.49 MG/DL (ref 0.76–1.27)
DEPRECATED RDW RBC AUTO: 37.6 FL (ref 37–54)
EGFRCR SERPLBLD CKD-EPI 2021: 108.4 ML/MIN/1.73
EOSINOPHIL # BLD AUTO: 0 10*3/MM3 (ref 0–0.4)
EOSINOPHIL NFR BLD AUTO: 0 % (ref 0.3–6.2)
ERYTHROCYTE [DISTWIDTH] IN BLOOD BY AUTOMATED COUNT: 11.2 % (ref 12.3–15.4)
GLOBULIN UR ELPH-MCNC: 2.9 GM/DL
GLUCOSE SERPL-MCNC: 86 MG/DL (ref 65–99)
HCT VFR BLD AUTO: 35.6 % (ref 37.5–51)
HGB BLD-MCNC: 12.4 G/DL (ref 13–17.7)
IMM GRANULOCYTES # BLD AUTO: 0.08 10*3/MM3 (ref 0–0.05)
IMM GRANULOCYTES NFR BLD AUTO: 0.7 % (ref 0–0.5)
LAB AP CASE REPORT: NORMAL
LYMPHOCYTES # BLD AUTO: 0.65 10*3/MM3 (ref 0.7–3.1)
LYMPHOCYTES NFR BLD AUTO: 5.9 % (ref 19.6–45.3)
MCH RBC QN AUTO: 31.8 PG (ref 26.6–33)
MCHC RBC AUTO-ENTMCNC: 34.8 G/DL (ref 31.5–35.7)
MCV RBC AUTO: 91.3 FL (ref 79–97)
MONOCYTES # BLD AUTO: 1.3 10*3/MM3 (ref 0.1–0.9)
MONOCYTES NFR BLD AUTO: 11.7 % (ref 5–12)
NEUTROPHILS NFR BLD AUTO: 81.6 % (ref 42.7–76)
NEUTROPHILS NFR BLD AUTO: 9.05 10*3/MM3 (ref 1.7–7)
NRBC BLD AUTO-RTO: 0 /100 WBC (ref 0–0.2)
PATH REPORT.FINAL DX SPEC: NORMAL
PATH REPORT.GROSS SPEC: NORMAL
PLATELET # BLD AUTO: 151 10*3/MM3 (ref 140–450)
PMV BLD AUTO: 9.5 FL (ref 6–12)
POTASSIUM SERPL-SCNC: 3.9 MMOL/L (ref 3.5–5.2)
PROT SERPL-MCNC: 6.3 G/DL (ref 6–8.5)
RBC # BLD AUTO: 3.9 10*6/MM3 (ref 4.14–5.8)
SODIUM SERPL-SCNC: 131 MMOL/L (ref 136–145)
WBC NRBC COR # BLD: 11.09 10*3/MM3 (ref 3.4–10.8)

## 2022-12-09 PROCEDURE — 80053 COMPREHEN METABOLIC PANEL: CPT | Performed by: SURGERY

## 2022-12-09 PROCEDURE — 85025 COMPLETE CBC W/AUTO DIFF WBC: CPT | Performed by: SURGERY

## 2022-12-09 PROCEDURE — 99024 POSTOP FOLLOW-UP VISIT: CPT | Performed by: SURGERY

## 2022-12-09 RX ORDER — OXYCODONE HYDROCHLORIDE AND ACETAMINOPHEN 5; 325 MG/1; MG/1
1 TABLET ORAL EVERY 6 HOURS PRN
Qty: 12 TABLET | Refills: 0 | Status: SHIPPED | OUTPATIENT
Start: 2022-12-09 | End: 2022-12-20

## 2022-12-09 RX ADMIN — PANTOPRAZOLE SODIUM 40 MG: 40 TABLET, DELAYED RELEASE ORAL at 08:16

## 2022-12-09 RX ADMIN — LEVETIRACETAM 500 MG: 500 TABLET, FILM COATED ORAL at 08:18

## 2022-12-09 RX ADMIN — METOPROLOL TARTRATE 75 MG: 25 TABLET, FILM COATED ORAL at 08:16

## 2022-12-09 RX ADMIN — AMLODIPINE BESYLATE 5 MG: 5 TABLET ORAL at 08:18

## 2022-12-09 RX ADMIN — SODIUM CHLORIDE 75 ML/HR: 9 INJECTION, SOLUTION INTRAVENOUS at 05:00

## 2022-12-09 RX ADMIN — ATORVASTATIN CALCIUM 20 MG: 20 TABLET, FILM COATED ORAL at 08:16

## 2022-12-09 RX ADMIN — TAMSULOSIN HYDROCHLORIDE 0.4 MG: 0.4 CAPSULE ORAL at 08:18

## 2022-12-09 RX ADMIN — SODIUM CHLORIDE TAB 1 GM 1 G: 1 TAB at 08:18

## 2022-12-09 NOTE — PROGRESS NOTES
Postop day 1 status post lap cesar IOC    S: No events overnight, pain is well controlled, tolerating clears    O:   Vitals:    12/08/22 1919 12/08/22 2300 12/09/22 0238 12/09/22 0741   BP: 140/81 131/64 119/65 135/82   BP Location: Right arm Right arm Right arm Left arm   Patient Position: Sitting Lying Lying Sitting   Pulse: 70 56 55 58   Resp: 18 18 18 18   Temp: 97.4 °F (36.3 °C) 97.5 °F (36.4 °C) 97.5 °F (36.4 °C) 97 °F (36.1 °C)   TempSrc: Oral Oral Oral Oral   SpO2: 99% 100% 97% 96%   Weight:       Height:         Alert, no acute distress  Breathing comfortable  Regular rate rhythm  Abdomen soft, nontender nondistended, incisions clean dry and intact    Labs are pending    Assessment and plan    Postop day 1 status post lap cesar IOC, tolerating clear liquid diet, pain is well controlled  Labs pending    Advance to regular diet  DC IV fluids  If no surprises with labs and tolerating regular diet I think he can be discharged home and follow-up in my office in 2 weeks

## 2022-12-09 NOTE — CASE MANAGEMENT/SOCIAL WORK
Case Management Discharge Note      Final Note: Patient discharged home, no needs.         Selected Continued Care - Discharged on 12/9/2022 Admission date: 12/6/2022 - Discharge disposition: Home or Self Care    Destination    No services have been selected for the patient.              Durable Medical Equipment    No services have been selected for the patient.              Dialysis/Infusion    No services have been selected for the patient.              Home Medical Care    No services have been selected for the patient.              Therapy    No services have been selected for the patient.              Community Resources    No services have been selected for the patient.              Community & DME    No services have been selected for the patient.                       Final Discharge Disposition Code: 01 - home or self-care

## 2022-12-09 NOTE — NURSING NOTE
Patient had a lap choli done yesterday. He had a good night. One Percocet was given with good pain relief. Vital signs were stable through the night. Patient got up in the chair around 4 AM. Says it feels better. Lap times 4 with dermabond dry and intact. WCTM.

## 2022-12-09 NOTE — DISCHARGE SUMMARY
Date of Admission: 12/6/2022  Date of Discharge:  12/9/2022  Primary Care Physician: Bird Hernandez,      Discharge Diagnosis:  Active Hospital Problems    Diagnosis  POA   • **Common bile duct stone [K80.50]  Unknown   • Seizure (HCC) [R56.9]  Unknown   • Hypertension [I10]  Yes   • History of prostate cancer [Z85.46]  Not Applicable   • Gastroesophageal reflux disease [K21.9]  Yes      Resolved Hospital Problems   No resolved problems to display.       DETAILS OF HOSPITAL STAY     Pertinent Test Results and Procedures Performed    CT scan of the abdomen and pelvis:  Cholelithiasis and choledocholithiasis. Gallstones are not well   appreciated on CT, however are much better demonstrated on the recent   comparison MRI. There is a trace amount of pericholecystic fluid which   may reflect cholecystitis. There is no evidence for biliary ductal   dilation.     Hospital Course  This is a 72-year-old male who presented to the emergency room with complaints of abdominal pain and nausea.  Please see H&P for full details of admission.  CT scan revealed cholelithiasis and choledocholithiasis.  Gastroenterology and general surgery were consulted.  He underwent ERCP followed by laparoscopic cholecystectomy.  He has done well postoperatively.  General surgery of seeing him this morning and advance his diet.  If he tolerates regular diet today then he can be discharged home.  He will follow-up with general surgery in 2 weeks and also recommended he see his primary care physician.  His LFTs are still somewhat elevated but better than upon presentation recommend these be checked as an outpatient to assure complete normalization.  Patient has been educated on maintaining a low-fat diet moving forward.  He will be discharged today.    Physical Exam at Discharge:  General: No acute distress, AAOx3  HEENT: EOMI, PERRL  Cardiovascular: +s1 and s2, RRR  Lungs: No rhonchi or wheezing  Abdomen: soft, nontender    Consults:   Consults      Date and Time Order Name Status Description    12/6/2022  3:30 PM Gastroenterology (on-call MD unless specified) Completed     12/6/2022  2:17 PM Gastroenterology (on-call MD unless specified)      12/6/2022  2:17 PM Surgery (on-call MD unless specified) Completed     12/6/2022  2:17 PM [facility] (on-call MD unless specified) Details              Condition on Discharge: Stable, improved    Discharge Disposition  Home or Self Care    Discharge Medications     Discharge Medications      New Medications      Instructions Start Date   oxyCODONE-acetaminophen 5-325 MG per tablet  Commonly known as: PERCOCET   1 tablet, Oral, Every 6 Hours PRN         Continue These Medications      Instructions Start Date   amLODIPine 5 MG tablet  Commonly known as: NORVASC   5 mg, Oral, Daily      atorvastatin 20 MG tablet  Commonly known as: LIPITOR   20 mg, Oral, Daily      calcium-vitamin D 500-200 MG-UNIT tablet per tablet   1 tablet, Oral, 2 Times Daily With Meals      furosemide 20 MG tablet  Commonly known as: LASIX   20 mg, Oral, Daily      levETIRAcetam 500 MG tablet  Commonly known as: KEPPRA   500 mg, Oral, 2 Times Daily      Loratadine 10 MG capsule   1 tablet, Oral, Daily      metoprolol tartrate 50 MG tablet  Commonly known as: LOPRESSOR   TAKE 1 AND 1/2 TABLETS BY MOUTH TWICE DAILY      omeprazole 40 MG capsule  Commonly known as: priLOSEC   40 mg, Oral, 2 Times Daily Before Meals      sodium chloride 1 g tablet   Takes 1 tablet in the morning and 1 evening.      tamsulosin 0.4 MG capsule 24 hr capsule  Commonly known as: FLOMAX   0.4 mg, Oral, Daily             Discharge Diet:   Diet Instructions     Diet: Regular; Thin      Discharge Diet: Regular    Fluid Consistency: Thin          Activity at Discharge:   Activity Instructions     Activity as Tolerated            Follow-up Appointments  Future Appointments   Date Time Provider Department Center   1/20/2023 10:45 AM Bird Hernandez DO [facility]   10/25/2023 10:15 AM  Bird Hernandez DO [facility]     Additional Instructions for the Follow-ups that You Need to Schedule     Discharge Follow-up with PCP   As directed       Currently Documented PCP:    Bird Hernandez DO    PCP Phone Number:    [phone]     Follow Up Details: 1 week         Discharge Follow-up with Specified Provider: Dr. Seth in 2 weeks   As directed      To: Dr. Seth in 2 weeks               Test Results Pending at Discharge  Pending Labs     Order Current Status    Tissue Pathology Exam In process          I have examined and discussed discharge planning with the patient today.    I wore full protective equipment throughout the patient encounter including eye protection and facemask.  Hand hygiene was performed before donning protective equipment and after removal when leaving the room.     Shayan Ramírez MD  12/09/22  09:24 EST    Time: Discharge greater than 30 min

## 2022-12-09 NOTE — PROGRESS NOTES
Name: Yeyo Abebe ADMIT: 2022   : 1950  PCP: Bird Hernandez DO    MRN: [id number] LOS: 3 days   AGE/SEX: 72 y.o. male  ROOM: [facility]     Subjective   Subjective   Patient is lying on the bed and denies any nausea, vomiting, abdominal pain, chest pain, shortness of breath.       Objective   Objective   Vital Signs  Temp:  [97 °F (36.1 °C)-98.8 °F (37.1 °C)] 97 °F (36.1 °C)  Heart Rate:  [55-72] 58  Resp:  [14-18] 18  BP: (119-146)/(64-85) 135/82  SpO2:  [90 %-100 %] 96 %  on  Flow (L/min):  [2-4] 2;   Device (Oxygen Therapy): nasal cannula  Body mass index is 22.07 kg/m².  Physical Exam  Constitutional:       General: He is not in acute distress.  HENT:      Head: Normocephalic and atraumatic.      Nose: Nose normal.      Mouth/Throat:      Mouth: Mucous membranes are dry.   Eyes:      Extraocular Movements: Extraocular movements intact.      Pupils: Pupils are equal, round, and reactive to light.   Cardiovascular:      Rate and Rhythm: Normal rate and regular rhythm.      Pulses: Normal pulses.      Heart sounds: Normal heart sounds.   Pulmonary:      Effort: Pulmonary effort is normal.      Breath sounds: Normal breath sounds.   Abdominal:      General: Bowel sounds are normal. There is no distension.      Palpations: Abdomen is soft.      Tenderness: There is no abdominal tenderness.   Musculoskeletal:      Cervical back: Normal range of motion and neck supple.      Right lower leg: No edema.      Left lower leg: No edema.   Skin:     General: Skin is warm and dry.      Coloration: Skin is not jaundiced.   Neurological:      General: No focal deficit present.      Mental Status: He is alert and oriented to person, place, and time.   Psychiatric:         Mood and Affect: Mood normal.         Behavior: Behavior normal.       Results Review     I reviewed the patient's new clinical results.  Results from last 7 days   Lab Units 22  0734 22  0724 22  0632 22  0656   WBC 10*3/mm3  11.09* 8.39 7.09 11.20*   HEMOGLOBIN g/dL 12.4* 11.7* 14.3 14.5   PLATELETS 10*3/mm3 151 162 192 186     Results from last 7 days   Lab Units 12/09/22  0734 12/08/22  0724 12/07/22  0632 12/06/22  0656   SODIUM mmol/L 131* 131* 128* 130*   POTASSIUM mmol/L 3.9 3.9 4.3 4.0   CHLORIDE mmol/L 93* 95* 91* 90*   CO2 mmol/L 27.8 26.4 30.7* 31.0*   BUN mg/dL 8 12 8 8   CREATININE mg/dL 0.49* 0.66* 0.59* 0.74*   GLUCOSE mg/dL 86 77 66 127*   EGFR mL/min/1.73 108.4 99.0 102.4 95.7     Results from last 7 days   Lab Units 12/09/22  0734 12/08/22  0724 12/06/22  0656   ALBUMIN g/dL 3.40* 3.30* 4.20   BILIRUBIN mg/dL 1.1 1.1 1.6*   ALK PHOS U/L 128* 138* 167*   AST (SGOT) U/L 96* 68* 314*   ALT (SGPT) U/L 256* 246* 448*     Results from last 7 days   Lab Units 12/09/22  0734 12/08/22  0724 12/07/22  0632 12/06/22  0656   CALCIUM mg/dL 8.5* 8.3* 9.2 9.6   ALBUMIN g/dL 3.40* 3.30*  --  4.20     Results from last 7 days   Lab Units 12/06/22  0656   LACTATE mmol/L 1.7     No results found for: HGBA1C, POCGLU    No radiology results for the last day  Scheduled Medications  amLODIPine, 5 mg, Oral, Daily  atorvastatin, 20 mg, Oral, Daily  levETIRAcetam, 500 mg, Oral, BID  metoprolol tartrate, 75 mg, Oral, Q12H  pantoprazole, 40 mg, Oral, QAM  sodium chloride, 1 g, Oral, BID With Meals  tamsulosin, 0.4 mg, Oral, Daily    Infusions  sodium chloride, 30 mL/hr, Last Rate: Stopped (12/07/22 1148)    Diet  Diet: Regular/House Diet; Texture: Regular Texture (IDDSI 7); Fluid Consistency: Thin (IDDSI 0)       Assessment/Plan     Active Hospital Problems    Diagnosis  POA   • **Common bile duct stone [K80.50]  Unknown   • Seizure (HCC) [R56.9]  Unknown   • Hypertension [I10]  Yes   • History of prostate cancer [Z85.46]  Not Applicable   • Gastroesophageal reflux disease [K21.9]  Yes      Resolved Hospital Problems   No resolved problems to display.       72 y.o. male admitted with Common bile duct stone.    #1 CBD stone, patient is status post  ERCP with stone removal and stent placement.  He is due to undergo laparoscopic cholecystectomy today and remains NPO.  Appears clinically stable.  2.  Hypertension, on metoprolol and amlodipine anteroperipheral reasonably well controlled.  3.  History of BPH, on Flomax.  4.  History of seizures, on Keppra.  5.  On SCDs for DVT prophylaxis.  6.  CODE STATUS is full code.    Frank Slaughter MD  [facility]  12/09/22  08:28 EST

## 2022-12-09 NOTE — DISCHARGE INSTRUCTIONS
POST OP RECOMMENDATIONS  Dr. Milton Seth  [phone]  Discharge Gall Bladder Surgery    Go home, rest and take it easy today; however, you should get up and move about several times today to reduce the risk of developing a blood clot in your legs.   You may experience some dizziness or memory loss from the anesthesia. This may last for the next 24 hours. Someone should plan on staying with you for the first 24 hours for your safety.   Do not make any important legal decisions or sign any legal papers for the next 24 hours.   Eat and drink lightly today. Start off with liquids, jello, soup, crackers or other bland foods at first. You may advance your diet tomorrow as tolerated as long as you do not experience any nausea or vomiting.   You may remove your outer dressings in 3 days. The white tapes called steri-strips should stay in place. They will fall off on their own in 1-2 weeks. Do not worry if they come off sooner.   You may notice some bleeding/drainage on your outer dressings. A little bloody drainage is normal. If the bleeding/drainage is such that the bandage cannot absorb it, remove the dressing, apply clean gauze and apply firm pressure for a full 15 minutes. If the bleeding continues, please call me.   You may shower tomorrow. No tub baths until your incisions are completely healed.   You have received a prescription for a narcotic pain medicine, as you will have some pain following surgery.  You will not be totally pain free, but your pain medicine should make the pain tolerable. Please take your pain medicine as prescribed and always take your pills with food to prevent nausea. If you are having severe pain that cannot be controlled by the pain medicine, please contact me.   You have also received a prescription for an anti-nausea medicine. Please take this as prescribed for any nausea or vomiting. Nausea could be a result of the anesthesia or a result of the narcotic pain medicine. If you experience  severe nausea and vomiting that cannot be controlled by the nausea medicine, please call me.   It is not unusual to experience pain/discomfort in your shoulders or your ribs after surgery. It is from the gas used during the laparoscopic procedure and usually lasts 1-3 days. The prescription pain medicine is used to treat the surgical pain and does not typically alleviate this “gassy” pain.   No driving for 24 hours and for as long as you are taking your prescription pain medicine.   You will need to call the office at [phone] to schedule a follow-up appointment in 6-10 days.   Remember to contact me for any of the following:   Fever > 101 degrees  Severe pain that cannot be controlled by taking your pain pills  Severe nausea or vomiting that cannot be controlled by taking your nausea pills  Significant bleeding of your incisions  Drainage that has a bad smell or is yellow or green in appearance  Any other questions or concerns

## 2022-12-10 NOTE — OUTREACH NOTE
Prep Survey    Flowsheet Row Responses   [facility] patient discharged from? [place]   Is LACE score < 7 ? No   Emergency Room discharge w/ pulse ox? No   Eligibility [facility]   Date of Admission 12/06/22   Date of Discharge 12/09/22   Discharge Disposition Home or Self Care   Discharge diagnosis CHOLECYSTECTOMY LAPAROSCOPIC INTRAOPERATIVE CHOLANGIOGRAM, WITH UPPER ENDOSCOPY   Does the patient have one of the following disease processes/diagnoses(primary or secondary)? General Surgery   Does the patient have Home health ordered? No   Is there a DME ordered? No   Prep survey completed? Yes          JUNIOR CORONA - Registered Nurse

## 2022-12-12 ENCOUNTER — TRANSITIONAL CARE MANAGEMENT TELEPHONE ENCOUNTER (OUTPATIENT)
Dept: CALL CENTER | Facility: HOSPITAL | Age: 72
End: 2022-12-12

## 2022-12-12 ENCOUNTER — TELEPHONE (OUTPATIENT)
Dept: GASTROENTEROLOGY | Facility: CLINIC | Age: 72
End: 2022-12-12

## 2022-12-12 NOTE — OUTREACH NOTE
Call Center TCM Note    Flowsheet Row Responses   [facility] patient discharged from? [place]   Does the patient have one of the following disease processes/diagnoses(primary or secondary)? General Surgery   TCM attempt successful? Yes   Call start time 1320   Call end time 1323   Discharge diagnosis CHOLECYSTECTOMY LAPAROSCOPIC INTRAOPERATIVE CHOLANGIOGRAM, WITH UPPER ENDOSCOPY   Meds reviewed with patient/caregiver? Yes   Is the patient having any side effects they believe may be caused by any medication additions or changes? No   Does the patient have all medications related to this admission filled (includes all antibiotics, pain medications, etc.) Yes   Is the patient taking all medications as directed (includes completed medication regime)? Yes   Comments Has a hospital followup scheduled on 12/15/2022 with Dr Hernandez and 12/20 with surgeon.    Does the patient have an appointment with their PCP within 7 days of discharge? Yes   Psychosocial issues? No   Did the patient receive a copy of their discharge instructions? Yes   Nursing interventions Reviewed instructions with patient   What is the patient's perception of their health status since discharge? Improving   Nursing interventions Nurse provided patient education   Is the patient /caregiver able to teach back basic post-op care? No tub bath, swimming, or hot tub until instructed by MD, Take showers only when approved by MD-sponge bathe until then   Is the patient/caregiver able to teach back signs and symptoms of incisional infection? Increased redness, swelling or pain at the incisonal site, Increased drainage or bleeding, Incisional warmth, Pus or odor from incision, Fever   Is the patient/caregiver able to teach back steps to recovery at home? Set small, achievable goals for return to baseline health, Rest and rebuild strength, gradually increase activity, Make a list of questions for surgeon's appointment   If the patient is a current smoker,  are they able to teach back resources for cessation? Not a smoker   Is the patient/caregiver able to teach back the hierarchy of who to call/visit for symptoms/problems? PCP, Specialist, Home health nurse, Urgent Care, ED, 911 Yes   TCM call completed? Yes   Call end time 1323   Would this patient benefit from a Referral to [facility] Social Work? No   Is the patient interested in additional calls from an ambulatory ?  NOTE:  applies to high risk patients requiring additional follow-up. No          Lux Mae RN    12/12/2022, 13:23 EST

## 2022-12-12 NOTE — TELEPHONE ENCOUNTER
BETINA patient via telephone for. Scheduled 01/12/2023 with arrival time of 1130am . Prep paperwork mailed to verified address on file. Patient advised arrival time may change based on [facility] guidelines. BETINA Bunn

## 2022-12-15 ENCOUNTER — OFFICE VISIT (OUTPATIENT)
Dept: INTERNAL MEDICINE | Facility: CLINIC | Age: 72
End: 2022-12-15

## 2022-12-15 VITALS
TEMPERATURE: 96.9 F | HEART RATE: 51 BPM | BODY MASS INDEX: 23.78 KG/M2 | SYSTOLIC BLOOD PRESSURE: 134 MMHG | OXYGEN SATURATION: 98 % | DIASTOLIC BLOOD PRESSURE: 76 MMHG | WEIGHT: 148 LBS | HEIGHT: 66 IN

## 2022-12-15 DIAGNOSIS — R74.01 TRANSAMINITIS: ICD-10-CM

## 2022-12-15 DIAGNOSIS — Z09 HOSPITAL DISCHARGE FOLLOW-UP: Primary | ICD-10-CM

## 2022-12-15 PROCEDURE — 1111F DSCHRG MED/CURRENT MED MERGE: CPT | Performed by: STUDENT IN AN ORGANIZED HEALTH CARE EDUCATION/TRAINING PROGRAM

## 2022-12-15 PROCEDURE — 99496 TRANSJ CARE MGMT HIGH F2F 7D: CPT | Performed by: STUDENT IN AN ORGANIZED HEALTH CARE EDUCATION/TRAINING PROGRAM

## 2022-12-15 NOTE — PROGRESS NOTES
"Transitional Care Follow Up Visit  Subjective     Yeyo Abebe is a 72 y.o. male who presents for a transitional care management visit.    Within 48 business hours after discharge our office contacted him via telephone to coordinate his care and needs.      I reviewed and discussed the details of that call along with the discharge summary, hospital problems, inpatient lab results, inpatient diagnostic studies, and consultation reports with Yeyo.     Current outpatient and discharge medications have been reconciled for the patient.  Reviewed by: Bird Hernandez DO      Date of TCM Phone Call 12/9/2022   [facility]   Date of Admission 12/6/2022   Date of Discharge 12/9/2022   Discharge Disposition Home or Self Care     Risk for Readmission (LACE) Score: 12 (12/9/2022  6:00 AM)      History of Present Illness   Course During Hospital Stay:    \"DETAILS OF HOSPITAL STAY      Pertinent Test Results and Procedures Performed     CT scan of the abdomen and pelvis:  Cholelithiasis and choledocholithiasis. Gallstones are not well   appreciated on CT, however are much better demonstrated on the recent   comparison MRI. There is a trace amount of pericholecystic fluid which   may reflect cholecystitis. There is no evidence for biliary ductal   dilation.      Hospital Course  This is a 72-year-old male who presented to the emergency room with complaints of abdominal pain and nausea.  Please see H&P for full details of admission.  CT scan revealed cholelithiasis and choledocholithiasis.  Gastroenterology and general surgery were consulted.  He underwent ERCP followed by laparoscopic cholecystectomy.  He has done well postoperatively.  General surgery of seeing him this morning and advance his diet.  If he tolerates regular diet today then he can be discharged home.  He will follow-up with general surgery in 2 weeks and also recommended he see his primary care physician.  His LFTs are still somewhat elevated but " "better than upon presentation recommend these be checked as an outpatient to assure complete normalization.  Patient has been educated on maintaining a low-fat diet moving forward.  He will be discharged today.\"     He follows up with general surgery next week , appointment is scheduled. He is having bowel movements, occasionally lose, and he has been very gassy. He has been sleeping in a chair. He has not required any Percocet that was prescribed but has been using Tylenol as needed. He is not eating any fried foods nor any greasy foods for 2 weeks. He is eating baked chicken breasts with mashed potatoes and he tolerates that well.     The following portions of the patient's history were reviewed and updated as appropriate: allergies, current medications, past family history, past medical history, past social history, past surgical history and problem list.        Objective   Physical Exam  Vitals reviewed.   Constitutional:       General: He is not in acute distress.     Appearance: Normal appearance. He is normal weight. He is not ill-appearing.   HENT:      Head: Atraumatic.   Eyes:      General: No scleral icterus.  Pulmonary:      Effort: Pulmonary effort is normal. No respiratory distress.   Abdominal:      General: Bowel sounds are normal. There is no distension.      Palpations: Abdomen is soft.      Tenderness: There is no abdominal tenderness. There is no guarding.      Comments: Well approximated laparoscopic surgical sites with no surrounding erythema and no discharge but with significant surrounding bruising    Neurological:      Mental Status: He is alert.   Psychiatric:         Mood and Affect: Mood normal.         Behavior: Behavior normal.         Thought Content: Thought content normal.         Assessment & Plan   Diagnoses and all orders for this visit:    1. Hospital discharge follow-up (Primary)  2. Transaminitis  -Patient presents to the office today for hospital follow-up visit.  Patient was " admitted at [facility] on 12/6/2022 and discharged on 12/9/2022.  Patient presented with acute abdominal pain and was diagnosed with cholelithiasis and choledocholithiasis.  He was seen by the general surgery service as well as the gastroenterology service and underwent ERCP with stone removal followed by cholecystectomy.  His hospital stay was complicated by transaminitis that was improving at time of discharge.  He has not required any of his opiate pain medications and is using PRN Tylenol for pain relief.  He has been afebrile, no dysuria, no respiratory complaints.  He is eating and drinking and following low fat diet.  He is having bowel movements.  He is passing a significant amount of gas but this is improving as expected.  -Abdominal exam as documented above.  -He reports having general surgery follow-up scheduled.  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  -Advised patient to return to ER or contact surgeon or this office for fever, chills, dysuria, worsening abdominal pain, change in bowel function.

## 2022-12-16 LAB
ALBUMIN SERPL-MCNC: 4.1 G/DL (ref 3.5–5.2)
ALBUMIN/GLOB SERPL: 1.8 G/DL
ALP SERPL-CCNC: 118 U/L (ref 39–117)
ALT SERPL-CCNC: 83 U/L (ref 1–41)
AST SERPL-CCNC: 30 U/L (ref 1–40)
BILIRUB SERPL-MCNC: 0.6 MG/DL (ref 0–1.2)
BUN SERPL-MCNC: 8 MG/DL (ref 8–23)
BUN/CREAT SERPL: 12.7 (ref 7–25)
CALCIUM SERPL-MCNC: 8.7 MG/DL (ref 8.6–10.5)
CHLORIDE SERPL-SCNC: 91 MMOL/L (ref 98–107)
CO2 SERPL-SCNC: 34.5 MMOL/L (ref 22–29)
CREAT SERPL-MCNC: 0.63 MG/DL (ref 0.76–1.27)
EGFRCR SERPLBLD CKD-EPI 2021: 100.4 ML/MIN/1.73
GLOBULIN SER CALC-MCNC: 2.3 GM/DL
GLUCOSE SERPL-MCNC: 105 MG/DL (ref 65–99)
POTASSIUM SERPL-SCNC: 3.7 MMOL/L (ref 3.5–5.2)
PROT SERPL-MCNC: 6.4 G/DL (ref 6–8.5)
SODIUM SERPL-SCNC: 129 MMOL/L (ref 136–145)

## 2022-12-20 ENCOUNTER — OFFICE VISIT (OUTPATIENT)
Dept: SURGERY | Facility: CLINIC | Age: 72
End: 2022-12-20

## 2022-12-20 DIAGNOSIS — Z51.89 VISIT FOR WOUND CHECK: ICD-10-CM

## 2022-12-20 DIAGNOSIS — Z09 POSTOP CHECK: Primary | ICD-10-CM

## 2022-12-20 PROCEDURE — 99024 POSTOP FOLLOW-UP VISIT: CPT | Performed by: SURGERY

## 2022-12-20 RX ORDER — ASPIRIN 81 MG/1
81 TABLET ORAL DAILY
COMMUNITY

## 2022-12-20 NOTE — PROGRESS NOTES
CC: Postop check    S: This is a 72 y.o. male who presents for a post-operative visit after undergoing a Laparoscopic Cholecystectomy with IOC and upper endoscopy on 12/7/2022.     Patient reports he is doing well. Eating well without any significant nausea. Having good bowel function. No problems with constipation or diarrhea. No urinary complaints. Denies fever. Ambulating well and slowly returning to normal activities.    Pathology report:    Final Diagnosis   1. Gallbladder, Cholecystectomy:               A. Acute and chronic ulcerative cholecystitis with focal foreign body giant cell reaction and cholelithiasis.     Report from upper endoscopy on 12/7/2022  Final Diagnosis   1. Gastric Body Biopsy: Gastric mucosa with                A. Mild chronic inflammation.               B. No H. pylori-like organisms identified by routine staining.       2. Gastric Body Polyps Biopsy:                A. Fragments of fundic gland polyp.               B. No H. pylori-like organisms identified by routine staining.                C. No glandular dysplasia nor malignancy identified.         O:   soft, nontender, nondistended, no masses or organomegaly  Incisions are healing well without any erythema or signs of infection. No signs of hernia.     Assessment and plan:     The patient is a very pleasant 72 y.o. male s/p laparoscopic cholecystectomy with intraoperative cholangiogram.  He had ERCP with sphincterotomy and stent placement the day before.  Patient is doing fine and does not have any specific complaints other than mild incisional pain.   Liver enzymes are downtrending.  Discussed with the patient about the need to get repeat liver enzymes in a month with primary care.  He will need to follow-up with GI for stent removal    I advised the patient to continue to refrain from doing any heavy lifting of more than 15 pounds for a total of 6 weeks.  Patient may start doing an aerobic type exercise in 4 weeks after  surgery.    Patient was given time to ask questions and all of them were answered appropriately.  The patient verbalized understanding and agreed with the plan.    he will follow-up at our office on a prn basis unless there are any problems.

## 2023-01-12 ENCOUNTER — ANESTHESIA (OUTPATIENT)
Dept: GASTROENTEROLOGY | Facility: HOSPITAL | Age: 73
End: 2023-01-12
Payer: MEDICARE

## 2023-01-12 ENCOUNTER — APPOINTMENT (OUTPATIENT)
Dept: GENERAL RADIOLOGY | Facility: HOSPITAL | Age: 73
End: 2023-01-12
Payer: MEDICARE

## 2023-01-12 ENCOUNTER — HOSPITAL ENCOUNTER (OUTPATIENT)
Facility: HOSPITAL | Age: 73
Setting detail: HOSPITAL OUTPATIENT SURGERY
Discharge: HOME | End: 2023-01-12
Attending: INTERNAL MEDICINE | Admitting: INTERNAL MEDICINE
Payer: MEDICARE

## 2023-01-12 ENCOUNTER — ANESTHESIA EVENT (OUTPATIENT)
Dept: GASTROENTEROLOGY | Facility: HOSPITAL | Age: 73
End: 2023-01-12
Payer: MEDICARE

## 2023-01-12 VITALS
HEART RATE: 61 BPM | OXYGEN SATURATION: 98 % | SYSTOLIC BLOOD PRESSURE: 134 MMHG | DIASTOLIC BLOOD PRESSURE: 86 MMHG | BODY MASS INDEX: 23.63 KG/M2 | RESPIRATION RATE: 17 BRPM | HEIGHT: 66 IN | WEIGHT: 147 LBS

## 2023-01-12 DIAGNOSIS — Z46.89 ENCOUNTER FOR REMOVAL OF BILIARY STENT: ICD-10-CM

## 2023-01-12 DIAGNOSIS — K80.50 CHOLEDOCHOLITHIASIS: ICD-10-CM

## 2023-01-12 PROCEDURE — 25010000002 PROPOFOL 10 MG/ML EMULSION: Performed by: ANESTHESIOLOGY

## 2023-01-12 PROCEDURE — 43273 ENDOSCOPIC PANCREATOSCOPY: CPT | Performed by: INTERNAL MEDICINE

## 2023-01-12 PROCEDURE — 43275 ERCP REMOVE FORGN BODY DUCT: CPT | Performed by: INTERNAL MEDICINE

## 2023-01-12 PROCEDURE — C1769 GUIDE WIRE: HCPCS | Performed by: INTERNAL MEDICINE

## 2023-01-12 PROCEDURE — 25010000002 ONDANSETRON PER 1 MG: Performed by: ANESTHESIOLOGY

## 2023-01-12 PROCEDURE — 25010000002 FENTANYL CITRATE (PF) 50 MCG/ML SOLUTION: Performed by: ANESTHESIOLOGY

## 2023-01-12 PROCEDURE — 43264 ERCP REMOVE DUCT CALCULI: CPT | Performed by: INTERNAL MEDICINE

## 2023-01-12 PROCEDURE — 0 LIDOCAINE 1 % SOLUTION: Performed by: ANESTHESIOLOGY

## 2023-01-12 PROCEDURE — 74328 X-RAY BILE DUCT ENDOSCOPY: CPT

## 2023-01-12 PROCEDURE — 25010000002 IOPAMIDOL 61 % SOLUTION: Performed by: INTERNAL MEDICINE

## 2023-01-12 PROCEDURE — 25010000002 DEXAMETHASONE PER 1 MG: Performed by: ANESTHESIOLOGY

## 2023-01-12 PROCEDURE — 25010000002 SUCCINYLCHOLINE PER 20 MG: Performed by: ANESTHESIOLOGY

## 2023-01-12 PROCEDURE — 25010000002 LEVOFLOXACIN PER 250 MG: Performed by: INTERNAL MEDICINE

## 2023-01-12 PROCEDURE — 25010000002 PHENYLEPHRINE 10 MG/ML SOLUTION: Performed by: ANESTHESIOLOGY

## 2023-01-12 RX ORDER — SODIUM CHLORIDE 0.9 % (FLUSH) 0.9 %
3-10 SYRINGE (ML) INJECTION AS NEEDED
Status: CANCELLED | OUTPATIENT
Start: 2023-01-12

## 2023-01-12 RX ORDER — FENTANYL CITRATE 50 UG/ML
50 INJECTION, SOLUTION INTRAMUSCULAR; INTRAVENOUS
Status: CANCELLED | OUTPATIENT
Start: 2023-01-12

## 2023-01-12 RX ORDER — PHENYLEPHRINE HYDROCHLORIDE 10 MG/ML
INJECTION INTRAVENOUS AS NEEDED
Status: DISCONTINUED | OUTPATIENT
Start: 2023-01-12 | End: 2023-01-12 | Stop reason: SURG

## 2023-01-12 RX ORDER — LEVOFLOXACIN 5 MG/ML
500 INJECTION, SOLUTION INTRAVENOUS ONCE
Status: COMPLETED | OUTPATIENT
Start: 2023-01-12 | End: 2023-01-12

## 2023-01-12 RX ORDER — ONDANSETRON 2 MG/ML
INJECTION INTRAMUSCULAR; INTRAVENOUS AS NEEDED
Status: DISCONTINUED | OUTPATIENT
Start: 2023-01-12 | End: 2023-01-12 | Stop reason: SURG

## 2023-01-12 RX ORDER — SODIUM CHLORIDE, SODIUM LACTATE, POTASSIUM CHLORIDE, CALCIUM CHLORIDE 600; 310; 30; 20 MG/100ML; MG/100ML; MG/100ML; MG/100ML
9 INJECTION, SOLUTION INTRAVENOUS CONTINUOUS
Status: CANCELLED | OUTPATIENT
Start: 2023-01-12

## 2023-01-12 RX ORDER — HYDROMORPHONE HCL 110MG/55ML
0.25 PATIENT CONTROLLED ANALGESIA SYRINGE INTRAVENOUS
Status: DISCONTINUED | OUTPATIENT
Start: 2023-01-12 | End: 2023-01-12 | Stop reason: HOSPADM

## 2023-01-12 RX ORDER — OXYCODONE AND ACETAMINOPHEN 7.5; 325 MG/1; MG/1
1 TABLET ORAL ONCE AS NEEDED
Status: DISCONTINUED | OUTPATIENT
Start: 2023-01-12 | End: 2023-01-12 | Stop reason: HOSPADM

## 2023-01-12 RX ORDER — PROMETHAZINE HYDROCHLORIDE 25 MG/1
25 TABLET ORAL ONCE AS NEEDED
Status: DISCONTINUED | OUTPATIENT
Start: 2023-01-12 | End: 2023-01-12 | Stop reason: HOSPADM

## 2023-01-12 RX ORDER — LIDOCAINE HYDROCHLORIDE 10 MG/ML
0.5 INJECTION, SOLUTION EPIDURAL; INFILTRATION; INTRACAUDAL; PERINEURAL ONCE AS NEEDED
Status: CANCELLED | OUTPATIENT
Start: 2023-01-12

## 2023-01-12 RX ORDER — LIDOCAINE HYDROCHLORIDE 10 MG/ML
INJECTION, SOLUTION INFILTRATION; PERINEURAL AS NEEDED
Status: DISCONTINUED | OUTPATIENT
Start: 2023-01-12 | End: 2023-01-12 | Stop reason: SURG

## 2023-01-12 RX ORDER — ROCURONIUM BROMIDE 10 MG/ML
INJECTION, SOLUTION INTRAVENOUS AS NEEDED
Status: DISCONTINUED | OUTPATIENT
Start: 2023-01-12 | End: 2023-01-12 | Stop reason: SURG

## 2023-01-12 RX ORDER — PROPOFOL 10 MG/ML
VIAL (ML) INTRAVENOUS AS NEEDED
Status: DISCONTINUED | OUTPATIENT
Start: 2023-01-12 | End: 2023-01-12 | Stop reason: SURG

## 2023-01-12 RX ORDER — FENTANYL CITRATE 50 UG/ML
50 INJECTION, SOLUTION INTRAMUSCULAR; INTRAVENOUS
Status: DISCONTINUED | OUTPATIENT
Start: 2023-01-12 | End: 2023-01-12 | Stop reason: HOSPADM

## 2023-01-12 RX ORDER — ENALAPRILAT 2.5 MG/2ML
1.25 INJECTION INTRAVENOUS ONCE AS NEEDED
Status: DISCONTINUED | OUTPATIENT
Start: 2023-01-12 | End: 2023-01-12 | Stop reason: HOSPADM

## 2023-01-12 RX ORDER — NALOXONE HCL 0.4 MG/ML
0.4 VIAL (ML) INJECTION AS NEEDED
Status: DISCONTINUED | OUTPATIENT
Start: 2023-01-12 | End: 2023-01-12 | Stop reason: HOSPADM

## 2023-01-12 RX ORDER — ONDANSETRON 2 MG/ML
4 INJECTION INTRAMUSCULAR; INTRAVENOUS ONCE AS NEEDED
Status: DISCONTINUED | OUTPATIENT
Start: 2023-01-12 | End: 2023-01-12 | Stop reason: HOSPADM

## 2023-01-12 RX ORDER — LEVOFLOXACIN 5 MG/ML
500 INJECTION, SOLUTION INTRAVENOUS ONCE
Status: CANCELLED | OUTPATIENT
Start: 2023-01-12

## 2023-01-12 RX ORDER — HYDROCODONE BITARTRATE AND ACETAMINOPHEN 5; 325 MG/1; MG/1
1 TABLET ORAL ONCE AS NEEDED
Status: DISCONTINUED | OUTPATIENT
Start: 2023-01-12 | End: 2023-01-12 | Stop reason: HOSPADM

## 2023-01-12 RX ORDER — DEXAMETHASONE SODIUM PHOSPHATE 4 MG/ML
INJECTION, SOLUTION INTRA-ARTICULAR; INTRALESIONAL; INTRAMUSCULAR; INTRAVENOUS; SOFT TISSUE AS NEEDED
Status: DISCONTINUED | OUTPATIENT
Start: 2023-01-12 | End: 2023-01-12 | Stop reason: SURG

## 2023-01-12 RX ORDER — SUCCINYLCHOLINE CHLORIDE 20 MG/ML
INJECTION INTRAMUSCULAR; INTRAVENOUS AS NEEDED
Status: DISCONTINUED | OUTPATIENT
Start: 2023-01-12 | End: 2023-01-12 | Stop reason: SURG

## 2023-01-12 RX ORDER — DIPHENHYDRAMINE HYDROCHLORIDE 50 MG/ML
12.5 INJECTION INTRAMUSCULAR; INTRAVENOUS
Status: DISCONTINUED | OUTPATIENT
Start: 2023-01-12 | End: 2023-01-12 | Stop reason: HOSPADM

## 2023-01-12 RX ORDER — MIDAZOLAM HYDROCHLORIDE 1 MG/ML
0.5 INJECTION INTRAMUSCULAR; INTRAVENOUS
Status: CANCELLED | OUTPATIENT
Start: 2023-01-12

## 2023-01-12 RX ORDER — LABETALOL HYDROCHLORIDE 5 MG/ML
5 INJECTION, SOLUTION INTRAVENOUS
Status: DISCONTINUED | OUTPATIENT
Start: 2023-01-12 | End: 2023-01-12 | Stop reason: HOSPADM

## 2023-01-12 RX ORDER — PROMETHAZINE HYDROCHLORIDE 25 MG/1
25 SUPPOSITORY RECTAL ONCE AS NEEDED
Status: DISCONTINUED | OUTPATIENT
Start: 2023-01-12 | End: 2023-01-12 | Stop reason: HOSPADM

## 2023-01-12 RX ORDER — FAMOTIDINE 10 MG/ML
20 INJECTION, SOLUTION INTRAVENOUS ONCE
Status: CANCELLED | OUTPATIENT
Start: 2023-01-12 | End: 2023-01-12

## 2023-01-12 RX ORDER — SODIUM CHLORIDE, SODIUM LACTATE, POTASSIUM CHLORIDE, CALCIUM CHLORIDE 600; 310; 30; 20 MG/100ML; MG/100ML; MG/100ML; MG/100ML
30 INJECTION, SOLUTION INTRAVENOUS CONTINUOUS PRN
Status: DISCONTINUED | OUTPATIENT
Start: 2023-01-12 | End: 2023-01-12 | Stop reason: HOSPADM

## 2023-01-12 RX ORDER — SODIUM CHLORIDE 0.9 % (FLUSH) 0.9 %
3 SYRINGE (ML) INJECTION EVERY 12 HOURS SCHEDULED
Status: CANCELLED | OUTPATIENT
Start: 2023-01-12

## 2023-01-12 RX ADMIN — LIDOCAINE HYDROCHLORIDE 60 MG: 10 INJECTION, SOLUTION INFILTRATION; PERINEURAL at 15:52

## 2023-01-12 RX ADMIN — PHENYLEPHRINE HYDROCHLORIDE 100 MCG: 10 INJECTION INTRAVENOUS at 16:01

## 2023-01-12 RX ADMIN — DEXAMETHASONE SODIUM PHOSPHATE 6 MG: 4 INJECTION, SOLUTION INTRA-ARTICULAR; INTRALESIONAL; INTRAMUSCULAR; INTRAVENOUS; SOFT TISSUE at 16:01

## 2023-01-12 RX ADMIN — PHENYLEPHRINE HYDROCHLORIDE 100 MCG: 10 INJECTION INTRAVENOUS at 15:58

## 2023-01-12 RX ADMIN — FENTANYL CITRATE 50 MCG: 0.05 INJECTION, SOLUTION INTRAMUSCULAR; INTRAVENOUS at 15:52

## 2023-01-12 RX ADMIN — SUGAMMADEX 200 MG: 100 INJECTION, SOLUTION INTRAVENOUS at 16:31

## 2023-01-12 RX ADMIN — PROPOFOL 100 MG: 10 INJECTION, EMULSION INTRAVENOUS at 15:52

## 2023-01-12 RX ADMIN — PHENYLEPHRINE HYDROCHLORIDE 100 MCG: 10 INJECTION INTRAVENOUS at 15:55

## 2023-01-12 RX ADMIN — ROCURONIUM BROMIDE 5 MG: 50 INJECTION INTRAVENOUS at 15:52

## 2023-01-12 RX ADMIN — ROCURONIUM BROMIDE 25 MG: 50 INJECTION INTRAVENOUS at 16:01

## 2023-01-12 RX ADMIN — SUCCINYLCHOLINE CHLORIDE 160 MG: 20 INJECTION, SOLUTION INTRAMUSCULAR; INTRAVENOUS; PARENTERAL at 15:52

## 2023-01-12 RX ADMIN — LEVOFLOXACIN 500 MG: 5 INJECTION, SOLUTION INTRAVENOUS at 15:37

## 2023-01-12 RX ADMIN — PROPOFOL 50 MCG/KG/MIN: 10 INJECTION, EMULSION INTRAVENOUS at 15:52

## 2023-01-12 RX ADMIN — ONDANSETRON 4 MG: 2 INJECTION INTRAMUSCULAR; INTRAVENOUS at 15:52

## 2023-01-12 RX ADMIN — SODIUM CHLORIDE, POTASSIUM CHLORIDE, SODIUM LACTATE AND CALCIUM CHLORIDE 30 ML/HR: 600; 310; 30; 20 INJECTION, SOLUTION INTRAVENOUS at 12:55

## 2023-01-12 NOTE — ANESTHESIA PROCEDURE NOTES
Airway  Urgency: elective    Date/Time: 1/12/2023 3:59 PM  Airway not difficult    General Information and Staff    Patient location during procedure: OR  Anesthesiologist: Isaak Miranda MD    Indications and Patient Condition  Indications for airway management: airway protection    Preoxygenated: yes  Mask difficulty assessment: 1 - vent by mask    Final Airway Details  Final airway type: endotracheal airway      Successful airway: ETT  Cuffed: yes   Successful intubation technique: video laryngoscopy  Endotracheal tube insertion site: oral  Blade: CMAC  Blade size: 4  ETT size (mm): 7.5  Cormack-Lehane Classification: grade I - full view of glottis  Placement verified by: chest auscultation and capnometry   Cuff volume (mL): 5  Measured from: teeth  ETT/EBT  to teeth (cm): 22  Number of attempts at approach: 1  Assessment: lips, teeth, and gum same as pre-op and atraumatic intubation

## 2023-01-12 NOTE — ANESTHESIA PREPROCEDURE EVALUATION
Anesthesia Evaluation     Patient summary reviewed and Nursing notes reviewed   NPO Solid Status: > 8 hours  NPO Liquid Status: > 2 hours           Airway   Mallampati: III  TM distance: >3 FB  Neck ROM: full  Difficult intubation highly probable and Small opening  Comment: Vocal cord dysfunction    MAC 4. Cormack-Lehane Classification: grade IIb - view of arytenoids or posterior of glottis only  Dental - normal exam     Pulmonary - negative pulmonary ROS and normal exam   Cardiovascular - normal exam    ECG reviewed  Patient on routine beta blocker    (+) hypertension 2 medications or greater, dysrhythmias PVC, CHF Diastolic >=55%,   (-) angina, orthopnea, PND, HAAS    ROS comment: Sinus rhythm  Probable left atrial enlargement  Minimal ST elevation, inferior leads  NO PRIOR TRACING AVAILABLE FOR COMPARISON    Neuro/Psych  (+) seizures,    GI/Hepatic/Renal/Endo    (+)  GERD,  renal disease stones,     ROS Comment: Syndrome of inappropriate secretion of antidiuretic hormone     Musculoskeletal     Abdominal    Substance History - negative use     OB/GYN negative ob/gyn ROS         Other   arthritis,    history of cancer (Prostate)                  Anesthesia Plan    ASA 3     general     ([facility]    Patient ate breakfast (was not instructed to fast) so we will have to wait until we have 6 hours NPO for safe induction of general anesthesia)    Anesthetic plan, risks, benefits, and alternatives have been provided, discussed and informed consent has been obtained with: patient.        CODE STATUS:

## 2023-01-12 NOTE — H&P
[facility]   HISTORY AND PHYSICAL    Patient Name: Yeyo Abebe  : 1950  MRN: [id number]  Primary Care Physician:  Bird Hernandez DO  Date of admission: 2023    Subjective   Subjective     Chief Complaint: biliary stent    History of Present Illness  Mr. Abebe is a 72 yoM w h/o choledocholithiasis s/p ERCP and biliary stent placement, s/p CCY presents for stent removal.   He reports he has been doing well since discharge.  No abd pain.  He has tolerated some fatty foods without issue as well.     Review of Systems   Constitutional: Negative for chills and fever.   Respiratory: Negative for cough and shortness of breath.    Gastrointestinal: Negative for abdominal distention, abdominal pain, nausea and vomiting.   Skin: Negative for color change and rash.   All other systems reviewed and are negative.       Personal History     Past Medical History:   Diagnosis Date    Allergies     BPH (benign prostatic hyperplasia)     GERD (gastroesophageal reflux disease)     Hypertension     Hyponatremia     Liver cyst     Pancreatic cyst     Personal history of kidney stones     Polio         Prostate CA (HCC)     Scoliosis     SIADH (syndrome of inappropriate ADH production) (HCC)        Past Surgical History:   Procedure Laterality Date    ABDOMINAL HERNIA REPAIR N/A     BACK SURGERY N/A     Dr. Hurt, scoliosis repair    CHOLECYSTECTOMY WITH INTRAOPERATIVE CHOLANGIOGRAM N/A 2022    Procedure: CHOLECYSTECTOMY LAPAROSCOPIC INTRAOPERATIVE CHOLANGIOGRAM, WITH UPPER ENDOSCOPY;  Surgeon: Milton Seth MD;  Location: [facility] OR;  Service: General;  Laterality: N/A;    COLONOSCOPY N/A     [facility]    COLONOSCOPY N/A 2019    Procedure: COLONOSCOPY TO CECUM;  Surgeon: Shayan Taveras MD;  Location: [facility] ENDOSCOPY;  Service: General    ENDOSCOPY N/A 2016    Procedure: ESOPHAGOGASTRODUODENOSCOPY WITH ANESTHESIA;  Surgeon: Shayan Taveras MD;  Location:  [facility] ENDOSCOPY;  Service:     ENDOSCOPY  12/11/2019    Procedure: ESOPHAGOGASTRODUODENOSCOPY WITH COLD BIOPSIES, CLIP PLACEMENT X1;  Surgeon: Shayan Taveras MD;  Location: [facility] ENDOSCOPY;  Service: General    ENDOSCOPY N/A 12/7/2022    Procedure: ESOPHAGOGASTRODUODENOSCOPY with biopsies;  Surgeon: Yimi Cuellar MD;  Location: [facility] ENDOSCOPY;  Service: Gastroenterology;  Laterality: N/A;  pre- abnormal CT, abdominal pain  post-- gastritis, gastric polyps    ERCP N/A 12/7/2022    Procedure: ENDOSCOPIC RETROGRADE CHOLANGIOPANCREATOGRAPHY with stone removal;  Surgeon: Yimi Cuellar MD;  Location: [facility] ENDOSCOPY;  Service: Gastroenterology;  Laterality: N/A;  pre- abnormal ct scan, abdominal pain  post-- stone retraction    PROSTATE RADIOACTIVE SEED IMPLANT      SPINAL FUSION      child       Family History: family history includes No Known Problems in his brother, sister, and sister; Pulmonary fibrosis in his father; Stroke in his mother. Otherwise pertinent FHx was reviewed and not pertinent to current issue.    Social History:  reports that he has never smoked. He has never used smokeless tobacco. He reports that he does not currently use alcohol. He reports that he does not use drugs.    Home Medications:  Loratadine, amLODIPine, aspirin, atorvastatin, calcium-vitamin D, furosemide, levETIRAcetam, metoprolol tartrate, omeprazole, sodium chloride, and tamsulosin    Allergies:  Allergies   Allergen Reactions    Aspirin-Caffeine Other (See Comments)     SWEATING      Codeine Anxiety     ALSO SWEATING AND ELEVATED BODY TEMPERATURE       Objective    Objective     Vitals:        Physical Exam  Constitutional:       General: He is not in acute distress.     Appearance: Normal appearance.   HENT:      Head: Normocephalic and atraumatic.      Nose: Nose normal.      Mouth/Throat:      Mouth: Mucous membranes are moist.      Pharynx: Oropharynx is clear.   Eyes:      General: No scleral icterus.      Conjunctiva/sclera: Conjunctivae normal.   Cardiovascular:      Rate and Rhythm: Normal rate and regular rhythm.   Pulmonary:      Effort: Pulmonary effort is normal. No respiratory distress.   Abdominal:      General: Abdomen is flat.      Palpations: Abdomen is soft.      Tenderness: There is no abdominal tenderness.   Musculoskeletal:         General: Normal range of motion.      Cervical back: Normal range of motion and neck supple.      Right lower leg: No edema.      Left lower leg: No edema.   Skin:     General: Skin is warm and dry.   Neurological:      General: No focal deficit present.      Mental Status: He is alert and oriented to person, place, and time. Mental status is at baseline.   Psychiatric:         Mood and Affect: Mood normal.         Behavior: Behavior normal.         Assessment & Plan   Assessment / Plan     Brief Patient Summary:  Yeyo Abebe is a 72 y.o. male who presents for biliary stent removal.     Active Hospital Problems:  H/o Choledocholithiasis s/p ERCP and Biliary Stent     Plan:   - ERCP today for stent removal    Yimi Cuellar MD

## 2023-01-13 RX ORDER — FENTANYL CITRATE 50 UG/ML
INJECTION, SOLUTION INTRAMUSCULAR; INTRAVENOUS AS NEEDED
Status: DISCONTINUED | OUTPATIENT
Start: 2023-01-12 | End: 2023-01-13 | Stop reason: SURG

## 2023-01-13 NOTE — ANESTHESIA POSTPROCEDURE EVALUATION
Patient: Yeyo Abebe    Procedure Summary     Date: 01/12/23 Room / [facility]:  [facility]    Anesthesia Start: 1537 Anesthesia Stop: 1642    Procedure: ENDOSCOPIC RETROGRADE CHOLANGIOPANCREATOGRAPHY with stent removal and balloon sweep Diagnosis:       Choledocholithiasis      Encounter for removal of biliary stent      (Choledocholithiasis [K80.50])      (Encounter for removal of biliary stent [Z46.89])    Surgeons: Yimi Cuellar MD Provider: Isaak Miranda MD    Anesthesia Type: general ASA Status: 3          Anesthesia Type: general    Vitals  Vitals Value Taken Time   /86 01/12/23 1659   Temp     Pulse 61 01/12/23 1659   Resp 17 01/12/23 1659   SpO2 98 % 01/12/23 1659           Post Anesthesia Care and Evaluation    Patient location during evaluation: bedside  Patient participation: complete - patient participated  Level of consciousness: responsive to light touch, responsive to verbal stimuli and sleepy but conscious  Pain score: 0  Pain management: adequate    Airway patency: patent  Anesthetic complications: No anesthetic complications  PONV Status: none  Cardiovascular status: acceptable and hemodynamically stable  Respiratory status: acceptable  Hydration status: acceptable

## 2023-01-19 ENCOUNTER — OFFICE VISIT (OUTPATIENT)
Dept: GASTROENTEROLOGY | Facility: CLINIC | Age: 73
End: 2023-01-19
Payer: MEDICARE

## 2023-01-19 VITALS
BODY MASS INDEX: 23.59 KG/M2 | WEIGHT: 146.8 LBS | SYSTOLIC BLOOD PRESSURE: 118 MMHG | DIASTOLIC BLOOD PRESSURE: 71 MMHG | HEART RATE: 74 BPM | TEMPERATURE: 97.1 F | HEIGHT: 66 IN

## 2023-01-19 DIAGNOSIS — K80.50 CHOLEDOCHOLITHIASIS: Primary | ICD-10-CM

## 2023-01-19 DIAGNOSIS — R79.89 ELEVATED LFTS: ICD-10-CM

## 2023-01-19 LAB
BASOPHILS # BLD AUTO: 0.01 10*3/MM3 (ref 0–0.2)
BASOPHILS NFR BLD AUTO: 0.2 % (ref 0–1.5)
EOSINOPHIL # BLD AUTO: 0.13 10*3/MM3 (ref 0–0.4)
EOSINOPHIL NFR BLD AUTO: 2 % (ref 0.3–6.2)
ERYTHROCYTE [DISTWIDTH] IN BLOOD BY AUTOMATED COUNT: 12.1 % (ref 12.3–15.4)
HCT VFR BLD AUTO: 39 % (ref 37.5–51)
HGB BLD-MCNC: 13.1 G/DL (ref 13–17.7)
IMM GRANULOCYTES # BLD AUTO: 0.03 10*3/MM3 (ref 0–0.05)
IMM GRANULOCYTES NFR BLD AUTO: 0.5 % (ref 0–0.5)
LYMPHOCYTES # BLD AUTO: 0.88 10*3/MM3 (ref 0.7–3.1)
LYMPHOCYTES NFR BLD AUTO: 13.7 % (ref 19.6–45.3)
MCH RBC QN AUTO: 31.6 PG (ref 26.6–33)
MCHC RBC AUTO-ENTMCNC: 33.6 G/DL (ref 31.5–35.7)
MCV RBC AUTO: 94.2 FL (ref 79–97)
MONOCYTES # BLD AUTO: 0.6 10*3/MM3 (ref 0.1–0.9)
MONOCYTES NFR BLD AUTO: 9.4 % (ref 5–12)
NEUTROPHILS # BLD AUTO: 4.76 10*3/MM3 (ref 1.7–7)
NEUTROPHILS NFR BLD AUTO: 74.2 % (ref 42.7–76)
NRBC BLD AUTO-RTO: 0 /100 WBC (ref 0–0.2)
PLATELET # BLD AUTO: 205 10*3/MM3 (ref 140–450)
RBC # BLD AUTO: 4.14 10*6/MM3 (ref 4.14–5.8)
WBC # BLD AUTO: 6.41 10*3/MM3 (ref 3.4–10.8)

## 2023-01-19 PROCEDURE — 99213 OFFICE O/P EST LOW 20 MIN: CPT | Performed by: PHYSICIAN ASSISTANT

## 2023-01-19 NOTE — PROGRESS NOTES
Chief Complaint  choledocholithiasis and ercp f/u    Subjective        History of Present Illness  Yeyo Abebe is a  72 y.o. male here for hospital follow-up.  He was originally seen in the hospital with Dr. Cuellar for choledocholithiasis and cholelithiasis.  Patient underwent EGD, ERCP with sphincterotomy and stent placements on 12/7/2022 followed by laparoscopic cholecystectomy with Dr. Seth on 12/8/2022.  He returned for ERCP on 1/12/2023 with stent removal, sweeping of the bile duct with removal of debris, currently angiogram noting mild external compression from adjacent surgical clip in the proximal main bile duct.    He presents today and states he is feeling well.  He is back to normal activities.  He does note occasionally he will have a somewhat nauseated feeling after consuming certain foods.  No episodes of diarrhea or fecal urgency after cholecystectomy.  He denies any fevers, chills, abdominal pain, nausea, vomiting, jaundice/acholic stools.    Past Medical History:   Diagnosis Date   • Allergies    • BPH (benign prostatic hyperplasia)    • Cholelithiasis 12/2023    Gallbladder removed   • GERD (gastroesophageal reflux disease)    • Hypertension    • Hyponatremia    • Liver cyst    • Pancreatic cyst    • Personal history of kidney stones    • Polio     1950s   • Prostate CA (HCC)    • Scoliosis    • SIADH (syndrome of inappropriate ADH production) (HCC)        Past Surgical History:   Procedure Laterality Date   • ABDOMINAL HERNIA REPAIR N/A 2008   • BACK SURGERY N/A 1958    Dr. Hurt, scoliosis repair   • CHOLECYSTECTOMY WITH INTRAOPERATIVE CHOLANGIOGRAM N/A 12/08/2022    Procedure: CHOLECYSTECTOMY LAPAROSCOPIC INTRAOPERATIVE CHOLANGIOGRAM, WITH UPPER ENDOSCOPY;  Surgeon: Milton Seth MD;  Location: [facility];  Service: General;  Laterality: N/A;   • COLONOSCOPY N/A 2010    [facility]   • COLONOSCOPY N/A 12/11/2019    Procedure: COLONOSCOPY TO CECUM;  Surgeon: Darcy  Shayan ROCHA MD;  Location: [facility] ENDOSCOPY;  Service: General   • ENDOSCOPY N/A 05/25/2016    Procedure: ESOPHAGOGASTRODUODENOSCOPY WITH ANESTHESIA;  Surgeon: Shayan Taveras MD;  Location: [facility] ENDOSCOPY;  Service:    • ENDOSCOPY  12/11/2019    Procedure: ESOPHAGOGASTRODUODENOSCOPY WITH COLD BIOPSIES, CLIP PLACEMENT X1;  Surgeon: Shayan Taveras MD;  Location: [facility] ENDOSCOPY;  Service: General   • ENDOSCOPY N/A 12/07/2022    Procedure: ESOPHAGOGASTRODUODENOSCOPY with biopsies;  Surgeon: Yimi Cuellar MD;  Location: [facility] ENDOSCOPY;  Service: Gastroenterology;  Laterality: N/A;  pre- abnormal CT, abdominal pain  post-- gastritis, gastric polyps   • ERCP N/A 12/07/2022    Procedure: ENDOSCOPIC RETROGRADE CHOLANGIOPANCREATOGRAPHY with stone removal;  Surgeon: Yimi Cuellar MD;  Location: [facility] ENDOSCOPY;  Service: Gastroenterology;  Laterality: N/A;  pre- abnormal ct scan, abdominal pain  post-- stone retraction   • ERCP N/A 01/12/2023    Procedure: ENDOSCOPIC RETROGRADE CHOLANGIOPANCREATOGRAPHY with stent removal and balloon sweep;  Surgeon: Yimi Cuelalr MD;  Location: [facility] ENDOSCOPY;  Service: Gastroenterology;  Laterality: N/A;  pre-stent removal  post-same   • PROSTATE RADIOACTIVE SEED IMPLANT     • SPINAL FUSION      child       Family History   Problem Relation Age of Onset   • Stroke Mother    • Pulmonary fibrosis Father            • No Known Problems Sister    • No Known Problems Sister    • No Known Problems Brother        Social History     Socioeconomic History   • Marital status:    Tobacco Use   • Smoking status: Never   • Smokeless tobacco: Never   Vaping Use   • Vaping Use: Never used   Substance and Sexual Activity   • Alcohol use: Not Currently     Comment: none since july 2022   • Drug use: Never   • Sexual activity: Defer       Allergies   Allergen Reactions   • Aspirin-Caffeine Other (See Comments)     SWEATING     • Codeine Anxiety     ALSO SWEATING AND  "ELEVATED BODY TEMPERATURE       Current Outpatient Medications on File Prior to Visit   Medication Sig Dispense Refill   • amLODIPine (NORVASC) 5 MG tablet Take 1 tablet by mouth Daily. 90 tablet 3   • aspirin 81 MG EC tablet Take 81 mg by mouth Daily.     • atorvastatin (LIPITOR) 20 MG tablet Take 1 tablet by mouth Daily. 90 tablet 1   • Calcium Carbonate-Vitamin D (calcium-vitamin D) 500-200 MG-UNIT tablet per tablet Take 1 tablet by mouth 2 (Two) Times a Day With Meals.     • furosemide (LASIX) 20 MG tablet Take 1 tablet by mouth Daily.     • Loratadine 10 MG capsule Take 1 tablet by mouth daily.     • metoprolol tartrate (LOPRESSOR) 50 MG tablet TAKE 1 AND 1/2 TABLETS BY MOUTH TWICE DAILY 270 tablet 3   • omeprazole (priLOSEC) 40 MG capsule Take 1 capsule by mouth 2 (Two) Times a Day Before Meals. 60 capsule 2   • sodium chloride 1 g tablet Takes 1 tablet in the morning and 1 evening.     • tamsulosin (FLOMAX) 0.4 MG capsule 24 hr capsule Take 0.4 mg by mouth Daily.  1   • levETIRAcetam (KEPPRA) 500 MG tablet Take 500 mg by mouth 2 (Two) Times a Day.       No current facility-administered medications on file prior to visit.       Review of Systems     Objective   Vital Signs:   /71   Pulse 74   Temp 97.1 °F (36.2 °C)   Ht 167.6 cm (66\")   Wt 66.6 kg (146 lb 12.8 oz)   BMI 23.69 kg/m²       Physical Exam  Vitals and nursing note reviewed.   Constitutional:       General: He is not in acute distress.     Appearance: Normal appearance. He is not ill-appearing.   HENT:      Head: Normocephalic and atraumatic.      Right Ear: External ear normal.      Left Ear: External ear normal.   Eyes:      General: No scleral icterus.     Conjunctiva/sclera: Conjunctivae normal.      Pupils: Pupils are equal, round, and reactive to light.   Pulmonary:      Effort: Pulmonary effort is normal.   Abdominal:      General: There is no distension.      Palpations: Abdomen is soft.      Tenderness: There is no abdominal " tenderness. There is no guarding or rebound.   Musculoskeletal:      Cervical back: Normal range of motion and neck supple.   Skin:     General: Skin is warm and dry.   Neurological:      Mental Status: He is alert and oriented to person, place, and time.   Psychiatric:         Mood and Affect: Mood normal.         Behavior: Behavior normal.          Result Review :       Common labs    Common Labs 12/8/22 12/8/22 12/9/22 12/9/22 12/15/22    0724 0724 0734 0734    Glucose  77  86 105 (A)   BUN  12  8 8   Creatinine  0.66 (A)  0.49 (A) 0.63 (A)   Sodium  131 (A)  131 (A) 129 (A)   Potassium  3.9  3.9 3.7   Chloride  95 (A)  93 (A) 91 (A)   Calcium  8.3 (A)  8.5 (A) 8.7   Total Protein     6.4   Albumin  3.30 (A)  3.40 (A) 4.10   Total Bilirubin  1.1  1.1 0.6   Alkaline Phosphatase  138 (A)  128 (A) 118 (A)   AST (SGOT)  68 (A)  96 (A) 30   ALT (SGPT)  246 (A)  256 (A) 83 (A)   WBC 8.39  11.09 (A)     Hemoglobin 11.7 (A)  12.4 (A)     Hematocrit 35.6 (A)  35.6 (A)     Platelets 162  151     (A) Abnormal value                                Assessment and Plan    Diagnoses and all orders for this visit:    1. Choledocholithiasis (Primary)  -     Comprehensive Metabolic Panel  -     CBC & Differential    2. Elevated LFTs      72-year-old with history of choledocholithiasis status post ERCP with sphincterotomy, stone extraction and stent placement followed by laparoscopic cholecystectomy.  He subsequently underwent ERCP with biliary stent extraction and removal of debris from the duct.  He has done well post procedurally.  Plan will be to update his CBC and CMP today and if all has returned to normal, he may follow-up as needed.      Follow Up   No follow-ups on file.    Dragon dictation used throughout this note.     MARIO Mata

## 2023-01-20 ENCOUNTER — OFFICE VISIT (OUTPATIENT)
Dept: INTERNAL MEDICINE | Facility: CLINIC | Age: 73
End: 2023-01-20
Payer: MEDICARE

## 2023-01-20 VITALS
OXYGEN SATURATION: 99 % | DIASTOLIC BLOOD PRESSURE: 80 MMHG | BODY MASS INDEX: 23.63 KG/M2 | HEIGHT: 66 IN | WEIGHT: 147 LBS | HEART RATE: 67 BPM | SYSTOLIC BLOOD PRESSURE: 110 MMHG | TEMPERATURE: 96.4 F

## 2023-01-20 DIAGNOSIS — I25.10 CORONARY ARTERY CALCIFICATION SEEN ON CT SCAN: Primary | ICD-10-CM

## 2023-01-20 DIAGNOSIS — R73.9 HYPERGLYCEMIA: ICD-10-CM

## 2023-01-20 DIAGNOSIS — I10 BENIGN ESSENTIAL HTN: ICD-10-CM

## 2023-01-20 LAB
ALBUMIN SERPL-MCNC: 4 G/DL (ref 3.5–5.2)
ALBUMIN/GLOB SERPL: 1.7 G/DL
ALP SERPL-CCNC: 76 U/L (ref 39–117)
ALT SERPL-CCNC: 19 U/L (ref 1–41)
AST SERPL-CCNC: 16 U/L (ref 1–40)
BILIRUB SERPL-MCNC: 0.6 MG/DL (ref 0–1.2)
BUN SERPL-MCNC: 11 MG/DL (ref 8–23)
BUN/CREAT SERPL: 14.9 (ref 7–25)
CALCIUM SERPL-MCNC: 9.2 MG/DL (ref 8.6–10.5)
CHLORIDE SERPL-SCNC: 94 MMOL/L (ref 98–107)
CHOLEST SERPL-MCNC: 146 MG/DL (ref 0–200)
CO2 SERPL-SCNC: 32.5 MMOL/L (ref 22–29)
CREAT SERPL-MCNC: 0.74 MG/DL (ref 0.76–1.27)
EGFRCR SERPLBLD CKD-EPI 2021: 95.7 ML/MIN/1.73
GLOBULIN SER CALC-MCNC: 2.3 GM/DL
GLUCOSE SERPL-MCNC: 135 MG/DL (ref 65–99)
HBA1C MFR BLD: 5.4 % (ref 4.8–5.6)
HDLC SERPL-MCNC: 76 MG/DL (ref 40–60)
LDLC SERPL CALC-MCNC: 57 MG/DL (ref 0–100)
LDLC/HDLC SERPL: 0.75 {RATIO}
POTASSIUM SERPL-SCNC: 4.3 MMOL/L (ref 3.5–5.2)
PROT SERPL-MCNC: 6.3 G/DL (ref 6–8.5)
SODIUM SERPL-SCNC: 135 MMOL/L (ref 136–145)
TRIGL SERPL-MCNC: 64 MG/DL (ref 0–150)
VLDLC SERPL CALC-MCNC: 13 MG/DL (ref 5–40)

## 2023-01-20 PROCEDURE — 99214 OFFICE O/P EST MOD 30 MIN: CPT | Performed by: STUDENT IN AN ORGANIZED HEALTH CARE EDUCATION/TRAINING PROGRAM

## 2023-01-20 NOTE — PROGRESS NOTES
Bird Hernandez D.O.  Internal Medicine  [facility]  [address], [address]  [place] [address]  [phone]      Chief Complaint  3 month follow-up    SUBJECTIVE    History of Present Illness    Yeyo Abebe is a 72 y.o. male who presents to the office today as an established patient that last saw me on 12/15/2022.     Coronary artery disease seen on CT: Now taking aspirin 81 mg daily and atorvastatin 20 mg daily as started in October 2022. No side effects that he is aware of.     HTN: taking amlodipine 5 mg daily, metoprolol tartrate 50 mg twice daily , furosemide 20 mg daily     Allergies   Allergen Reactions   • Aspirin-Caffeine Other (See Comments)     SWEATING     • Codeine Anxiety     ALSO SWEATING AND ELEVATED BODY TEMPERATURE        Outpatient Medications Marked as Taking for the 1/20/23 encounter (Office Visit) with Bird Hernandez, DO   Medication Sig Dispense Refill   • amLODIPine (NORVASC) 5 MG tablet Take 1 tablet by mouth Daily. 90 tablet 3   • aspirin 81 MG EC tablet Take 81 mg by mouth Daily.     • atorvastatin (LIPITOR) 20 MG tablet Take 1 tablet by mouth Daily. 90 tablet 1   • Calcium Carbonate-Vitamin D (calcium-vitamin D) 500-200 MG-UNIT tablet per tablet Take 1 tablet by mouth 2 (Two) Times a Day With Meals.     • furosemide (LASIX) 20 MG tablet Take 1 tablet by mouth Daily.     • Loratadine 10 MG capsule Take 1 tablet by mouth daily.     • metoprolol tartrate (LOPRESSOR) 50 MG tablet TAKE 1 AND 1/2 TABLETS BY MOUTH TWICE DAILY 270 tablet 3   • omeprazole (priLOSEC) 40 MG capsule Take 1 capsule by mouth 2 (Two) Times a Day Before Meals. 60 capsule 2   • sodium chloride 1 g tablet Takes 1 tablet in the morning and 1 evening.     • tamsulosin (FLOMAX) 0.4 MG capsule 24 hr capsule Take 0.4 mg by mouth Daily.  1        Past Medical History:   Diagnosis Date   • Allergies    • BPH (benign prostatic hyperplasia)    • Cholelithiasis 12/2023    Gallbladder removed   • GERD  "(gastroesophageal reflux disease)    • Hypertension    • Hyponatremia    • Liver cyst    • Pancreatic cyst    • Personal history of kidney stones    • Polio     1950s   • Prostate CA (HCC)    • Scoliosis    • SIADH (syndrome of inappropriate ADH production) (HCC)        OBJECTIVE    Vital Signs:   /80   Pulse 67   Temp 96.4 °F (35.8 °C) (Infrared)   Ht 167.6 cm (66\")   Wt 66.7 kg (147 lb)   SpO2 99%   BMI 23.73 kg/m²     Physical Exam  Vitals reviewed.   Constitutional:       General: He is not in acute distress.     Appearance: Normal appearance. He is normal weight. He is not ill-appearing.   HENT:      Head: Atraumatic.   Eyes:      General: No scleral icterus.  Cardiovascular:      Rate and Rhythm: Normal rate and regular rhythm.      Heart sounds: Normal heart sounds. No murmur heard.  Pulmonary:      Effort: Pulmonary effort is normal. No respiratory distress.      Breath sounds: Normal breath sounds. No stridor. No wheezing or rhonchi.   Musculoskeletal:      Right lower leg: No edema.      Left lower leg: No edema.   Neurological:      Mental Status: He is alert.   Psychiatric:         Mood and Affect: Mood normal.         Behavior: Behavior normal.         Thought Content: Thought content normal.            The following data was reviewed by: Bird Hernandez DO on 01/20/2023:  Common labs    Common Labs 12/9/22 12/9/22 12/15/22 1/19/23 1/19/23    0734 0734  1125 1125   Glucose  86 105 (A)  135 (A)   BUN  8 8  11   Creatinine  0.49 (A) 0.63 (A)  0.74 (A)   Sodium  131 (A) 129 (A)  135 (A)   Potassium  3.9 3.7  4.3   Chloride  93 (A) 91 (A)  94 (A)   Calcium  8.5 (A) 8.7  9.2   Total Protein   6.4  6.3   Albumin  3.40 (A) 4.10  4.0   Total Bilirubin  1.1 0.6  0.6   Alkaline Phosphatase  128 (A) 118 (A)  76   AST (SGOT)  96 (A) 30  16   ALT (SGPT)  256 (A) 83 (A)  19   WBC 11.09 (A)   6.41    Hemoglobin 12.4 (A)   13.1    Hematocrit 35.6 (A)   39.0    Platelets 151   205    (A) Abnormal value        "                  ASSESSMENT & PLAN     Diagnoses and all orders for this visit:    1. Coronary artery calcification seen on CT scan (Primary)  -Incidental finding of aortic and coronary artery calcifications seen on August 2022 CT chest abdomen pelvis at outside hospital. MRI of the brain from 8/8/2022 also demonstrated sequela of chronic microangiopathic ischemic change  -he has had no side effects with start of aspirin 81 mg daily and atorvastatin 20 mg daily  Lab Results   Component Value Date    CHLPL 160 10/20/2022    TRIG 59 10/20/2022    HDL 75 (H) 10/20/2022    LDL 73 10/20/2022     -recheck lipid profile today to assess after starting statin   -     Lipid Panel With LDL/HDL Ratio    2. Hyperglycemia  -  Check    Hemoglobin A1c    3. HTN  -taking amlodipine 5 mg daily, metoprolol tartrate 50 mg twice daily , furosemide 20 mg daily   -great control at 110/80 in office today  -continue current regimen         The following social determinates of health impact the patient's medical decision making: No social determinates of health were factored in to today's visit.     Follow Up  Return in about 3 months (around 4/20/2023).    Patient/family had no further questions at this time and verbalized understanding of the plan discussed today.

## 2023-01-25 DIAGNOSIS — K21.9 LPRD (LARYNGOPHARYNGEAL REFLUX DISEASE): ICD-10-CM

## 2023-01-25 RX ORDER — OMEPRAZOLE 40 MG/1
CAPSULE, DELAYED RELEASE ORAL
Qty: 60 CAPSULE | Refills: 2 | Status: SHIPPED | OUTPATIENT
Start: 2023-01-25

## 2023-04-19 DIAGNOSIS — I25.10 CORONARY ARTERY CALCIFICATION SEEN ON CT SCAN: ICD-10-CM

## 2023-04-19 RX ORDER — ATORVASTATIN CALCIUM 20 MG/1
20 TABLET, FILM COATED ORAL DAILY
Qty: 90 TABLET | Refills: 1 | Status: SHIPPED | OUTPATIENT
Start: 2023-04-19

## 2023-04-27 DIAGNOSIS — K21.9 LPRD (LARYNGOPHARYNGEAL REFLUX DISEASE): ICD-10-CM

## 2023-04-27 RX ORDER — OMEPRAZOLE 40 MG/1
CAPSULE, DELAYED RELEASE ORAL
Qty: 60 CAPSULE | Refills: 2 | Status: SHIPPED | OUTPATIENT
Start: 2023-04-27

## 2023-07-31 DIAGNOSIS — K21.9 LPRD (LARYNGOPHARYNGEAL REFLUX DISEASE): ICD-10-CM

## 2023-07-31 DIAGNOSIS — I10 BENIGN ESSENTIAL HTN: ICD-10-CM

## 2023-07-31 DIAGNOSIS — I25.10 CORONARY ARTERY CALCIFICATION SEEN ON CT SCAN: ICD-10-CM

## 2023-07-31 RX ORDER — AMLODIPINE BESYLATE 5 MG/1
5 TABLET ORAL DAILY
Qty: 90 TABLET | Refills: 1 | Status: SHIPPED | OUTPATIENT
Start: 2023-07-31

## 2023-07-31 RX ORDER — OMEPRAZOLE 40 MG/1
CAPSULE, DELAYED RELEASE ORAL
Qty: 60 CAPSULE | Refills: 2 | Status: SHIPPED | OUTPATIENT
Start: 2023-07-31

## 2023-07-31 RX ORDER — ATORVASTATIN CALCIUM 20 MG/1
20 TABLET, FILM COATED ORAL DAILY
Qty: 90 TABLET | Refills: 1 | Status: SHIPPED | OUTPATIENT
Start: 2023-07-31

## 2023-10-25 ENCOUNTER — OFFICE VISIT (OUTPATIENT)
Dept: INTERNAL MEDICINE | Facility: CLINIC | Age: 73
End: 2023-10-25
Payer: MEDICARE

## 2023-10-25 VITALS
WEIGHT: 155 LBS | SYSTOLIC BLOOD PRESSURE: 128 MMHG | DIASTOLIC BLOOD PRESSURE: 72 MMHG | HEIGHT: 66 IN | HEART RATE: 62 BPM | BODY MASS INDEX: 24.91 KG/M2 | OXYGEN SATURATION: 98 %

## 2023-10-25 DIAGNOSIS — Z00.00 MEDICARE ANNUAL WELLNESS VISIT, SUBSEQUENT: Primary | ICD-10-CM

## 2023-10-25 PROCEDURE — G0439 PPPS, SUBSEQ VISIT: HCPCS | Performed by: STUDENT IN AN ORGANIZED HEALTH CARE EDUCATION/TRAINING PROGRAM

## 2023-10-25 PROCEDURE — 3078F DIAST BP <80 MM HG: CPT | Performed by: STUDENT IN AN ORGANIZED HEALTH CARE EDUCATION/TRAINING PROGRAM

## 2023-10-25 PROCEDURE — 1170F FXNL STATUS ASSESSED: CPT | Performed by: STUDENT IN AN ORGANIZED HEALTH CARE EDUCATION/TRAINING PROGRAM

## 2023-10-25 PROCEDURE — 3074F SYST BP LT 130 MM HG: CPT | Performed by: STUDENT IN AN ORGANIZED HEALTH CARE EDUCATION/TRAINING PROGRAM

## 2023-10-25 NOTE — PROGRESS NOTES
The ABCs of the Annual Wellness Visit  Subsequent Medicare Wellness Visit    Subjective      Yeyo Abebe is a 73 y.o. male who presents for a Subsequent Medicare Wellness Visit.    The following portions of the patient's history were reviewed and   updated as appropriate: allergies, current medications, past family history, past medical history, past social history, past surgical history, and problem list.    history of SIADH per chart review with chronic hyponatremia in the upper 120s/low 130s . Follows with nephrology. Takes sodium chloride tablet 1 g twice daily     Seasonal allergies: takes loratadine, states he is allergic to grass and weeds.     Laryngopharyngeal reflux: takes omeprazole 40 mg twice daily     Prostate cancer, BPH:  Follows with [facility]. S/p Eliguard and radiation therapy for his prostate cancer. For BPH, takes tamsulosin 0.4 mg.     HTN: taking amlodipine 5 mg daily, metoprolol tartrate 75 mg twice daily , furosemide 20 mg daily . Doesn't check BP at home.    Coronary artery disease seen on CT: Now taking aspirin 81 mg daily and atorvastatin 20 mg daily      Compared to one year ago, the patient feels his physical   health is better.    Compared to one year ago, the patient feels his mental   health is better.    Recent Hospitalizations:  This patient has had a [facility] admission record on file within the last 365 days.    Current Medical Providers:  Patient Care Team:  Bird Hernandez DO as PCP - General (Internal Medicine)  Shayan Taveras MD as Consulting Physician (General Surgery)  Yimi Morales Jr., MD as Consulting Physician (Urology)    Outpatient Medications Prior to Visit   Medication Sig Dispense Refill    amLODIPine (NORVASC) 5 MG tablet TAKE 1 TABLET BY MOUTH DAILY 90 tablet 1    aspirin 81 MG EC tablet Take 1 tablet by mouth Daily.      atorvastatin (LIPITOR) 20 MG tablet TAKE 1 TABLET BY MOUTH DAILY 90 tablet 1    Calcium Carbonate-Vitamin D  (calcium-vitamin D) 500-200 MG-UNIT tablet per tablet Take 1 tablet by mouth 2 (Two) Times a Day With Meals.      furosemide (LASIX) 20 MG tablet Take 1 tablet by mouth Daily.      Loratadine 10 MG capsule Take 1 capsule by mouth Daily.      metoprolol tartrate (LOPRESSOR) 50 MG tablet TAKE 1 AND 1/2 TABLETS BY MOUTH TWICE DAILY 270 tablet 3    omeprazole (priLOSEC) 40 MG capsule TAKE 1 CAPSULE BY MOUTH TWICE DAILY BEFORE MEALS 60 capsule 2    sodium chloride 1 g tablet Takes 1 tablet in the morning and 1 evening.      tamsulosin (FLOMAX) 0.4 MG capsule 24 hr capsule Take 1 capsule by mouth Daily.  1     No facility-administered medications prior to visit.       No opioid medication identified on active medication list. I have reviewed chart for other potential  high risk medication/s and harmful drug interactions in the elderly.        Aspirin is on active medication list. Aspirin use is indicated based on review of current medical condition/s. Pros and cons of this therapy have been discussed today. Benefits of this medication outweigh potential harm.  Patient has been encouraged to continue taking this medication.  .      Patient Active Problem List   Diagnosis    Gastroesophageal reflux disease    Syndrome of inappropriate secretion of antidiuretic hormone    Ventricular premature beats    Vocal cord dysfunction    Chronic venous insufficiency    Chronic hyponatremia    Benign prostatic hyperplasia with nocturia    Allergic rhinitis    Thoracogenic scoliosis    Calcium oxalate renal stones    Cancer    Chest pain    Diastolic dysfunction    Environmental allergies    Hypertension    Osteoarthritis of lumbar spine    History of prostate cancer    Screening for colon cancer    Common bile duct stone    Seizure     Advance Care Planning   Advance Care Planning     Advance Directive is not on file.  ACP discussion was held with the patient during this visit. Patient has an advance directive (not in EMR), copy  "requested.     Objective    Vitals:    10/25/23 1015   BP: 128/72   Pulse: 62   SpO2: 98%   Weight: 70.3 kg (155 lb)   Height: 167.6 cm (66\")     Physical Exam  Vitals reviewed.   Constitutional:       General: He is not in acute distress.     Appearance: Normal appearance. He is normal weight. He is not ill-appearing.   HENT:      Head: Normocephalic and atraumatic.      Ears:      Comments: +hearing aids   Eyes:      General: No scleral icterus.  Cardiovascular:      Rate and Rhythm: Normal rate and regular rhythm.      Heart sounds: Normal heart sounds. No murmur heard.  Pulmonary:      Effort: Pulmonary effort is normal. No respiratory distress.      Breath sounds: Normal breath sounds. No wheezing or rhonchi.   Abdominal:      General: Bowel sounds are normal. There is no distension.      Palpations: Abdomen is soft.      Tenderness: There is no abdominal tenderness. There is no guarding.   Skin:     Coloration: Skin is not jaundiced.   Neurological:      Mental Status: He is alert.   Psychiatric:         Mood and Affect: Mood normal.         Behavior: Behavior normal.         Thought Content: Thought content normal.           Estimated body mass index is 25.02 kg/m² as calculated from the following:    Height as of this encounter: 167.6 cm (66\").    Weight as of this encounter: 70.3 kg (155 lb).    BMI is within normal parameters. No other follow-up for BMI required.      Does the patient have evidence of cognitive impairment?   No            HEALTH RISK ASSESSMENT    Smoking Status:  Social History     Tobacco Use   Smoking Status Never   Smokeless Tobacco Never     Alcohol Consumption:  Social History     Substance and Sexual Activity   Alcohol Use Yes    Alcohol/week: 0.0 - 1.0 standard drinks of alcohol     Fall Risk Screen:    STEADI Fall Risk Assessment was completed, and patient is at LOW risk for falls.Assessment completed on:10/25/2023    Depression Screening:      10/25/2023    10:20 AM   PHQ-2/PHQ-9 " Depression Screening   Little Interest or Pleasure in Doing Things 0-->not at all   Feeling Down, Depressed or Hopeless 0-->not at all   PHQ-9: Brief Depression Severity Measure Score 0       Health Habits and Functional and Cognitive Screening:      10/25/2023    10:17 AM   Functional & Cognitive Status   Do you have difficulty preparing food and eating? No   Do you have difficulty bathing yourself, getting dressed or grooming yourself? No   Do you have difficulty using the toilet? No   Do you have difficulty moving around from place to place? No   Do you have trouble with steps or getting out of a bed or a chair? No   Current Diet Other   Dental Exam Up to date   Eye Exam Up to date   Current Exercises Include Walking        Exercise Comment golf   Do you need help using the phone?  No   Are you deaf or do you have serious difficulty hearing?  Yes   Do you need help to go to places out of walking distance? No   Do you need help shopping? No   Do you need help preparing meals?  No   Do you need help with housework?  No   Do you need help with laundry? No   Do you need help taking your medications? No   Do you need help managing money? No   Do you ever drive or ride in a car without wearing a seat belt? No   Have you felt unusual stress, anger or loneliness in the last month? No   Who do you live with? Alone   If you need help, do you have trouble finding someone available to you? No   Do you have difficulty concentrating, remembering or making decisions? No       Age-appropriate Screening Schedule:  Refer to the list below for future screening recommendations based on patient's age, sex and/or medical conditions. Orders for these recommended tests are listed in the plan section. The patient has been provided with a written plan.    Health Maintenance   Topic Date Due    TDAP/TD VACCINES (1 - Tdap) Never done    ZOSTER VACCINE (1 of 2) Never done    Pneumococcal Vaccine 65+ (1 - PCV) Never done    BMI FOLLOWUP   10/12/2022    ANNUAL WELLNESS VISIT  10/25/2024    COLORECTAL CANCER SCREENING  12/11/2029    HEPATITIS C SCREENING  Completed    COVID-19 Vaccine  Completed    INFLUENZA VACCINE  Completed    LIPID PANEL  Discontinued                  CMS Preventative Services Quick Reference  Risk Factors Identified During Encounter:    Hearing Problem:  he wears hearing aids  Immunizations Discussed/Encouraged: Prevnar 20 (Pneumococcal 20-valent conjugate), Shingrix, and RSV    The above risks/problems have been discussed with the patient.  Pertinent information has been shared with the patient in the After Visit Summary.      Follow Up:   Next Medicare Wellness visit to be scheduled in 1 year.      An After Visit Summary and PPPS were made available to the patient.

## 2023-10-26 DIAGNOSIS — I10 BENIGN ESSENTIAL HTN: ICD-10-CM

## 2023-10-26 DIAGNOSIS — K21.9 LPRD (LARYNGOPHARYNGEAL REFLUX DISEASE): ICD-10-CM

## 2023-10-26 RX ORDER — OMEPRAZOLE 40 MG/1
CAPSULE, DELAYED RELEASE ORAL
Qty: 60 CAPSULE | Refills: 2 | Status: SHIPPED | OUTPATIENT
Start: 2023-10-26

## 2023-10-26 RX ORDER — METOPROLOL TARTRATE 50 MG/1
TABLET, FILM COATED ORAL
Qty: 270 TABLET | Refills: 3 | Status: SHIPPED | OUTPATIENT
Start: 2023-10-26

## 2024-01-24 DIAGNOSIS — I10 BENIGN ESSENTIAL HTN: ICD-10-CM

## 2024-01-24 DIAGNOSIS — K21.9 LPRD (LARYNGOPHARYNGEAL REFLUX DISEASE): ICD-10-CM

## 2024-01-24 NOTE — TELEPHONE ENCOUNTER
Rx Refill Note  Requested Prescriptions     Pending Prescriptions Disp Refills    amLODIPine (NORVASC) 5 MG tablet [Pharmacy Med Name: AMLODIPINE BESYLATE 5MG TABLETS] 90 tablet 1     Sig: TAKE 1 TABLET BY MOUTH DAILY    omeprazole (priLOSEC) 40 MG capsule [Pharmacy Med Name: OMEPRAZOLE 40MG CAPSULES] 60 capsule 2     Sig: TAKE 1 CAPSULE BY MOUTH TWICE DAILY BEFORE MEALS      Last office visit with prescribing clinician: 10/25/2023   Last telemedicine visit with prescribing clinician: Visit date not found   Next office visit with prescribing clinician: 1/24/2024                         Would you like a call back once the refill request has been completed: [] Yes [] No    If the office needs to give you a call back, can they leave a voicemail: [] Yes [] No    Hal Grace MA  01/24/24, 08:48 EST

## 2024-01-28 RX ORDER — OMEPRAZOLE 40 MG/1
CAPSULE, DELAYED RELEASE ORAL
Qty: 60 CAPSULE | Refills: 2 | OUTPATIENT
Start: 2024-01-28

## 2024-01-28 RX ORDER — AMLODIPINE BESYLATE 5 MG/1
5 TABLET ORAL DAILY
Qty: 90 TABLET | Refills: 2 | Status: SHIPPED | OUTPATIENT
Start: 2024-01-28

## 2024-01-28 RX ORDER — OMEPRAZOLE 40 MG/1
CAPSULE, DELAYED RELEASE ORAL
Qty: 180 CAPSULE | Refills: 2 | Status: SHIPPED | OUTPATIENT
Start: 2024-01-28

## 2024-04-15 DIAGNOSIS — I25.10 CORONARY ARTERY CALCIFICATION SEEN ON CT SCAN: ICD-10-CM

## 2024-04-15 RX ORDER — ATORVASTATIN CALCIUM 20 MG/1
20 TABLET, FILM COATED ORAL DAILY
Qty: 90 TABLET | Refills: 1 | Status: SHIPPED | OUTPATIENT
Start: 2024-04-15

## 2024-04-25 ENCOUNTER — OFFICE VISIT (OUTPATIENT)
Dept: INTERNAL MEDICINE | Facility: CLINIC | Age: 74
End: 2024-04-25
Payer: MEDICARE

## 2024-04-25 VITALS
BODY MASS INDEX: 25.71 KG/M2 | HEIGHT: 66 IN | WEIGHT: 160 LBS | SYSTOLIC BLOOD PRESSURE: 112 MMHG | HEART RATE: 61 BPM | OXYGEN SATURATION: 98 % | DIASTOLIC BLOOD PRESSURE: 70 MMHG

## 2024-04-25 DIAGNOSIS — I10 ESSENTIAL HYPERTENSION: Primary | ICD-10-CM

## 2024-04-25 PROCEDURE — 3078F DIAST BP <80 MM HG: CPT | Performed by: STUDENT IN AN ORGANIZED HEALTH CARE EDUCATION/TRAINING PROGRAM

## 2024-04-25 PROCEDURE — G2211 COMPLEX E/M VISIT ADD ON: HCPCS | Performed by: STUDENT IN AN ORGANIZED HEALTH CARE EDUCATION/TRAINING PROGRAM

## 2024-04-25 PROCEDURE — 99213 OFFICE O/P EST LOW 20 MIN: CPT | Performed by: STUDENT IN AN ORGANIZED HEALTH CARE EDUCATION/TRAINING PROGRAM

## 2024-04-25 PROCEDURE — 3074F SYST BP LT 130 MM HG: CPT | Performed by: STUDENT IN AN ORGANIZED HEALTH CARE EDUCATION/TRAINING PROGRAM

## 2024-04-25 NOTE — PROGRESS NOTES
Bird Hernandez D.O.  Internal Medicine  [facility]  [address], [address]  [place] [address]  [phone]      Chief Complaint  Hypertension    SUBJECTIVE    History of Present Illness    Yeyo Abebe is a 73 y.o. male who presents to the office today as an established patient that last saw me on 10/25/2023.     HTN: taking amlodipine 5 mg daily, metoprolol tartrate 75 mg twice daily , furosemide 20 mg daily . Doesn't check BP at home.   States he has mild leg swelling if he sits for a long period of time but resolves after walking around.   For exercise he stays active and tries to walk 3-4 times per week for a mile at least.   Feels that his diet is overall not good and may have gained a few pounds over the winter.     Allergies   Allergen Reactions    Aspirin-Caffeine Other (See Comments)     SWEATING      Codeine Anxiety     ALSO SWEATING AND ELEVATED BODY TEMPERATURE        Outpatient Medications Marked as Taking for the 4/25/24 encounter (Office Visit) with Bird Hernandez, DO   Medication Sig Dispense Refill    amLODIPine (NORVASC) 5 MG tablet TAKE 1 TABLET BY MOUTH DAILY 90 tablet 2    aspirin 81 MG EC tablet Take 1 tablet by mouth Daily.      atorvastatin (LIPITOR) 20 MG tablet TAKE 1 TABLET BY MOUTH DAILY 90 tablet 1    Calcium Carbonate-Vitamin D (calcium-vitamin D) 500-200 MG-UNIT tablet per tablet Take 1 tablet by mouth 2 (Two) Times a Day With Meals.      furosemide (LASIX) 20 MG tablet Take 1 tablet by mouth Daily.      Loratadine 10 MG capsule Take 1 capsule by mouth Daily.      metoprolol tartrate (LOPRESSOR) 50 MG tablet TAKE 1 AND 1/2 TABLETS BY MOUTH TWICE DAILY 270 tablet 3    omeprazole (priLOSEC) 40 MG capsule TAKE 1 CAPSULE BY MOUTH TWICE DAILY BEFORE MEALS 180 capsule 2    sodium chloride 1 g tablet Takes 1 tablet in the morning and 1 evening.      tamsulosin (FLOMAX) 0.4 MG capsule 24 hr capsule Take 1 capsule by mouth Daily.  1        Past Medical History:  "  Diagnosis Date    Allergies     BPH (benign prostatic hyperplasia)     Cholelithiasis 12/2023    Gallbladder removed    GERD (gastroesophageal reflux disease)     Hypertension     Hyponatremia     Liver cyst     Pancreatic cyst     Personal history of kidney stones     Polio     1950s    Prostate CA     Scoliosis     SIADH (syndrome of inappropriate ADH production)        OBJECTIVE    Vital Signs:   /70   Pulse 61   Ht 167.6 cm (66\")   Wt 72.6 kg (160 lb)   SpO2 98%   BMI 25.82 kg/m²        Physical Exam  Vitals reviewed.   Constitutional:       General: He is not in acute distress.     Appearance: Normal appearance. He is not ill-appearing.   Eyes:      General: No scleral icterus.  Cardiovascular:      Rate and Rhythm: Normal rate and regular rhythm.      Heart sounds: Normal heart sounds. No murmur heard.  Pulmonary:      Effort: Pulmonary effort is normal. No respiratory distress.      Breath sounds: Normal breath sounds. No wheezing or rhonchi.   Musculoskeletal:      Right lower leg: Edema (trace pretibial) present.      Left lower leg: Edema (trace pretibial) present.   Skin:     Coloration: Skin is not jaundiced.   Neurological:      Mental Status: He is alert.   Psychiatric:         Mood and Affect: Mood normal.         Behavior: Behavior normal.         Thought Content: Thought content normal.                             ASSESSMENT & PLAN     Diagnoses and all orders for this visit:    1. Essential hypertension (Primary)  -taking amlodipine 5 mg daily, metoprolol tartrate 75 mg twice daily , furosemide 20 mg daily . Doesn't check BP at home.   States he has mild leg swelling if he sits for a long period of time but resolves after walking around.   For exercise he stays active and tries to walk 3-4 times per week for a mile at least.   Feels that his diet is overall not good and may have gained a few pounds over the winter.   -BP well controlled 112/70 today in office , continue current " regimen  -dependent edema mild and improves with activity  -I reviewed 2/2024 BMP from Nephrology, overall normal          Follow Up  No follow-ups on file.    Patient/family had no further questions at this time and verbalized understanding of the plan discussed today.

## 2024-09-04 ENCOUNTER — TELEPHONE (OUTPATIENT)
Dept: INTERNAL MEDICINE | Facility: CLINIC | Age: 74
End: 2024-09-04

## 2024-09-04 RX ORDER — FUROSEMIDE 20 MG
20 TABLET ORAL DAILY
Qty: 90 TABLET | Refills: 1 | Status: SHIPPED | OUTPATIENT
Start: 2024-09-04

## 2024-09-04 NOTE — TELEPHONE ENCOUNTER
Caller: Yeyo Abebe    Relationship: Self    Best call back number:[phone]     What medication are you requesting: furosemide (LASIX) 20 MG tablet     What are your current symptoms: PREVIOUSLY WRITTEN BY KIDNEY DOCTOR, WHO TOLD THE PATIENT THAT DR. BOBO WILL CONTINUE PRESCRIBING FROM NOW ON    How long have you been experiencing symptoms: 4 DAYS LEFT    If a prescription is needed, what is your preferred pharmacy and phone number: [facility] #[id number] [place], [place] - [address] AT [facility] - [phone]  - [phone] FX

## 2024-10-09 DIAGNOSIS — I25.10 CORONARY ARTERY CALCIFICATION SEEN ON CT SCAN: ICD-10-CM

## 2024-10-09 RX ORDER — ATORVASTATIN CALCIUM 20 MG/1
20 TABLET, FILM COATED ORAL DAILY
Qty: 90 TABLET | Refills: 1 | Status: SHIPPED | OUTPATIENT
Start: 2024-10-09

## 2024-10-28 ENCOUNTER — OFFICE VISIT (OUTPATIENT)
Dept: INTERNAL MEDICINE | Facility: CLINIC | Age: 74
End: 2024-10-28
Payer: MEDICARE

## 2024-10-28 VITALS
TEMPERATURE: 97.7 F | HEIGHT: 66 IN | OXYGEN SATURATION: 99 % | HEART RATE: 63 BPM | WEIGHT: 158 LBS | DIASTOLIC BLOOD PRESSURE: 72 MMHG | SYSTOLIC BLOOD PRESSURE: 110 MMHG | BODY MASS INDEX: 25.39 KG/M2

## 2024-10-28 DIAGNOSIS — I25.10 CORONARY ARTERY CALCIFICATION SEEN ON CT SCAN: ICD-10-CM

## 2024-10-28 DIAGNOSIS — K21.9 GASTROESOPHAGEAL REFLUX DISEASE, UNSPECIFIED WHETHER ESOPHAGITIS PRESENT: ICD-10-CM

## 2024-10-28 DIAGNOSIS — R20.2 PARESTHESIA: ICD-10-CM

## 2024-10-28 DIAGNOSIS — E87.1 CHRONIC HYPONATREMIA: ICD-10-CM

## 2024-10-28 DIAGNOSIS — Z00.00 MEDICARE ANNUAL WELLNESS VISIT, SUBSEQUENT: Primary | ICD-10-CM

## 2024-10-28 PROCEDURE — G0439 PPPS, SUBSEQ VISIT: HCPCS | Performed by: STUDENT IN AN ORGANIZED HEALTH CARE EDUCATION/TRAINING PROGRAM

## 2024-10-28 PROCEDURE — 1126F AMNT PAIN NOTED NONE PRSNT: CPT | Performed by: STUDENT IN AN ORGANIZED HEALTH CARE EDUCATION/TRAINING PROGRAM

## 2024-10-28 PROCEDURE — 3074F SYST BP LT 130 MM HG: CPT | Performed by: STUDENT IN AN ORGANIZED HEALTH CARE EDUCATION/TRAINING PROGRAM

## 2024-10-28 PROCEDURE — 3078F DIAST BP <80 MM HG: CPT | Performed by: STUDENT IN AN ORGANIZED HEALTH CARE EDUCATION/TRAINING PROGRAM

## 2024-10-28 PROCEDURE — 99214 OFFICE O/P EST MOD 30 MIN: CPT | Performed by: STUDENT IN AN ORGANIZED HEALTH CARE EDUCATION/TRAINING PROGRAM

## 2024-10-28 PROCEDURE — 1170F FXNL STATUS ASSESSED: CPT | Performed by: STUDENT IN AN ORGANIZED HEALTH CARE EDUCATION/TRAINING PROGRAM

## 2024-10-28 RX ORDER — DEXLANSOPRAZOLE 60 MG/1
60 CAPSULE, DELAYED RELEASE ORAL DAILY
Qty: 30 CAPSULE | Refills: 0 | Status: SHIPPED | OUTPATIENT
Start: 2024-10-28 | End: 2024-11-03

## 2024-10-28 NOTE — PROGRESS NOTES
" Subjective   The ABCs of the Annual Wellness Visit  Medicare Wellness Visit      Yeyo Abebe is a 74 y.o. patient who presents for a Medicare Wellness Visit.    The following portions of the patient's history were reviewed and   updated as appropriate: allergies, current medications, past family history, past medical history, past social history, past surgical history, and problem list.    Coronary artery disease seen on CT: Now taking aspirin 81 mg daily and atorvastatin 20 mg daily  Lab Results   Component Value Date    CHLPL 146 01/20/2023    TRIG 64 01/20/2023    HDL 76 (H) 01/20/2023    LDL 57 01/20/2023     Seasonal allergies: takes loratadine, states he is allergic to grass and weeds.      Laryngopharyngeal reflux: takes omeprazole 40 mg twice daily. States this medication seems that it is not doing as well. States reflux has always been a problem and can be impacted by what he eats. He is getting by \"OK\". Feels more issues at night when lying down.      Prostate cancer, BPH:  Follows with [facility]. S/p Eliguard and radiation therapy for his prostate cancer. For BPH, takes tamsulosin 0.4 mg. Last PSA at Urology 0.42 8/2024.     history of SIADH per chart review with chronic hyponatremia in the upper 120s/low 130s . Followe with  but now released from nephrology. Takes sodium chloride tablet 1 g twice daily     HTN: taking amlodipine 5 mg daily, metoprolol tartrate 75 mg twice daily , furosemide 20 mg daily . Doesn't check BP at home.     States he has very sensitive feet. This has been for at least several years. States he is \"aware\"of the bottoms of his feet. He states perhaps he has tingling. He states perhaps his feet are just sensitive.     Compared to one year ago, the patient's physical   health is the same.  Compared to one year ago, the patient's mental   health is the same.    Recent Hospitalizations:  He was not admitted to the hospital during the last year.     Current Medical " Providers:  Patient Care Team:  Bird Hernandez DO as PCP - General (Internal Medicine)  Shayan Taveras MD as Consulting Physician (General Surgery)  Yimi Morales Jr., MD as Consulting Physician (Urology)    Outpatient Medications Prior to Visit   Medication Sig Dispense Refill    amLODIPine (NORVASC) 5 MG tablet TAKE 1 TABLET BY MOUTH DAILY 90 tablet 2    aspirin 81 MG EC tablet Take 1 tablet by mouth Daily.      atorvastatin (LIPITOR) 20 MG tablet TAKE 1 TABLET BY MOUTH DAILY 90 tablet 1    Calcium Carbonate-Vitamin D (calcium-vitamin D) 500-200 MG-UNIT tablet per tablet Take 1 tablet by mouth 2 (Two) Times a Day With Meals.      furosemide (LASIX) 20 MG tablet Take 1 tablet by mouth Daily. 90 tablet 1    Loratadine 10 MG capsule Take 1 capsule by mouth Daily.      metoprolol tartrate (LOPRESSOR) 50 MG tablet TAKE 1 AND 1/2 TABLETS BY MOUTH TWICE DAILY 270 tablet 3    sodium chloride 1 g tablet Takes 1 tablet in the morning and 1 evening.      tamsulosin (FLOMAX) 0.4 MG capsule 24 hr capsule Take 1 capsule by mouth Daily.  1    omeprazole (priLOSEC) 40 MG capsule TAKE 1 CAPSULE BY MOUTH TWICE DAILY BEFORE MEALS 180 capsule 2     No facility-administered medications prior to visit.     No opioid medication identified on active medication list. I have reviewed chart for other potential  high risk medication/s and harmful drug interactions in the elderly.      Aspirin is on active medication list. Aspirin use is indicated based on review of current medical condition/s. Pros and cons of this therapy have been discussed today. Benefits of this medication outweigh potential harm.  Patient has been encouraged to continue taking this medication.  .      Patient Active Problem List   Diagnosis    Gastroesophageal reflux disease    Syndrome of inappropriate secretion of antidiuretic hormone    Ventricular premature beats    Vocal cord dysfunction    Chronic venous insufficiency    Chronic hyponatremia    Benign  "prostatic hyperplasia with nocturia    Allergic rhinitis    Thoracogenic scoliosis    Calcium oxalate renal stones    Cancer    Chest pain    Diastolic dysfunction    Environmental allergies    Hypertension    Osteoarthritis of lumbar spine    History of prostate cancer    Screening for colon cancer    Common bile duct stone    Seizure     Advance Care Planning Advance Directive is not on file.  ACP discussion was held with the patient during this visit. Patient does not have an advance directive, information provided.            Objective   Vitals:    10/28/24 1112   BP: 110/72   Pulse: 63   Temp: 97.7 °F (36.5 °C)   TempSrc: Infrared   SpO2: 99%   Weight: 71.7 kg (158 lb)   Height: 167.6 cm (66\")   PainSc: 0-No pain   Physical Exam  Vitals reviewed.   Constitutional:       General: He is not in acute distress.     Appearance: Normal appearance. He is not ill-appearing.   HENT:      Head: Normocephalic and atraumatic.   Eyes:      General: No scleral icterus.  Cardiovascular:      Rate and Rhythm: Normal rate and regular rhythm.      Heart sounds: Normal heart sounds. No murmur heard.  Pulmonary:      Effort: Pulmonary effort is normal. No respiratory distress.      Breath sounds: Normal breath sounds. No wheezing.   Abdominal:      General: Bowel sounds are normal. There is no distension.      Palpations: Abdomen is soft.      Tenderness: There is no abdominal tenderness. There is no guarding.   Musculoskeletal:      Right lower leg: No edema.      Left lower leg: No edema.   Feet:      Right foot:      Skin integrity: Callus (medial aspect great toe) present. No ulcer, blister, skin breakdown or erythema.      Toenail Condition: Right toenails are normal.      Left foot:      Skin integrity: Callus (medial aspect great toe) present. No ulcer, blister, skin breakdown or erythema.      Toenail Condition: Left toenails are normal.      Comments: B/l feet and ankles intact gross, monofilament and vibratory " "sensation  Skin:     Coloration: Skin is not jaundiced.   Neurological:      Mental Status: He is alert.   Psychiatric:         Mood and Affect: Mood normal.         Behavior: Behavior normal.         Thought Content: Thought content normal.           Estimated body mass index is 25.5 kg/m² as calculated from the following:    Height as of this encounter: 167.6 cm (66\").    Weight as of this encounter: 71.7 kg (158 lb).    BMI is >= 25 and <30. (Overweight) The following options were offered after discussion;: exercise counseling/recommendations       Does the patient have evidence of cognitive impairment? No                                                                                               Health  Risk Assessment    Smoking Status:  Social History     Tobacco Use   Smoking Status Never   Smokeless Tobacco Never     Alcohol Consumption:  Social History     Substance and Sexual Activity   Alcohol Use Yes    Alcohol/week: 0.0 - 1.0 standard drinks of alcohol       Fall Risk Screen  STEADI Fall Risk Assessment was completed, and patient is at LOW risk for falls.Assessment completed on:10/28/2024    Depression Screening:      10/28/2024    11:16 AM   PHQ-2/PHQ-9 Depression Screening   Little interest or pleasure in doing things Not at all   Feeling down, depressed, or hopeless Not at all     Health Habits and Functional and Cognitive Screening:      10/27/2024     7:08 PM   Functional & Cognitive Status   Do you have difficulty preparing food and eating? No    Do you have difficulty bathing yourself, getting dressed or grooming yourself? No    Do you have difficulty using the toilet? No    Do you have difficulty moving around from place to place? No    Do you have trouble with steps or getting out of a bed or a chair? No    Current Diet Frequent Junk Food    Dental Exam Up to date    Eye Exam Up to date    Exercise (times per week) 4 times per week    Current Exercises Include Gardening;House Cleaning;Light " Weights;Other;Walking;Yard Work    Do you need help using the phone?  No    Are you deaf or do you have serious difficulty hearing?  No    Do you need help to go to places out of walking distance? No    Do you need help shopping? No    Do you need help preparing meals?  No    Do you need help with housework?  No    Do you need help with laundry? No    Do you need help taking your medications? No    Do you need help managing money? No    Do you ever drive or ride in a car without wearing a seat belt? No    Have you felt unusual stress, anger or loneliness in the last month? No    Who do you live with? Alone    If you need help, do you have trouble finding someone available to you? No    Have you been bothered in the last four weeks by sexual problems? No    Do you have difficulty concentrating, remembering or making decisions? No        Patient-reported           Age-appropriate Screening Schedule:  Refer to the list below for future screening recommendations based on patient's age, sex and/or medical conditions. Orders for these recommended tests are listed in the plan section. The patient has been provided with a written plan.    Health Maintenance List  Health Maintenance   Topic Date Due    TDAP/TD VACCINES (1 - Tdap) Never done    BMI FOLLOWUP  10/25/2024    ANNUAL WELLNESS VISIT  10/28/2025    COLORECTAL CANCER SCREENING  12/11/2029    HEPATITIS C SCREENING  Completed    COVID-19 Vaccine  Completed    RSV Vaccine - Adults  Completed    INFLUENZA VACCINE  Completed    Pneumococcal Vaccine 65+  Completed    ZOSTER VACCINE  Completed    LIPID PANEL  Discontinued                                                                                                                                                CMS Preventative Services Quick Reference  Risk Factors Identified During Encounter  Immunizations Discussed/Encouraged: Td    The above risks/problems have been discussed with the patient.  Pertinent information  "has been shared with the patient in the After Visit Summary.  An After Visit Summary and PPPS were made available to the patient.    Follow Up:   Next Medicare Wellness visit to be scheduled in 1 year.     A problem-based visit was also conducted on the same day, see below for assessment and plan    Diagnoses and all orders for this visit:    1. Chronic hyponatremia (Primary)  -history of SIADH per chart review with chronic hyponatremia in the upper 120s/low 130s . Followed with  but now released from nephrology. Takes sodium chloride tablet 1 g twice daily .  -recheck CMP today for continued monitoring     2. Coronary artery calcification seen on CT scan  -Now taking aspirin 81 mg daily and atorvastatin 20 mg daily  Lab Results   Component Value Date    CHLPL 146 01/20/2023    TRIG 64 01/20/2023    HDL 76 (H) 01/20/2023    LDL 57 01/20/2023     -last lipid profile in 2023 with great control  -recheck for continued annual monitoring. Goal LDL at least less than 70.  -     Lipid Panel    3. Gastroesophageal reflux disease, unspecified whether esophagitis present  - takes omeprazole 40 mg twice daily. States this medication seems that it is not doing as well. States reflux has always been a problem and can be impacted by what he eats. He is getting by \"OK\". Feels more issues at night when lying down.   -recommend stopping omeprazole and switching to Dexilant 60 mg daily for attempted improvement in symptoms   -     dexlansoprazole (DEXILANT) 60 MG capsule; Take 1 capsule by mouth Daily for 30 days.  Dispense: 30 capsule; Refill: 0    4. Paresthesia  -States he has very sensitive feet. This has been for at least several years. States he is \"aware\"of the bottoms of his feet. He states perhaps he has tingling. He states perhaps his feet are just sensitive.   -normal foot exam as documented above aside from small callus b/l great toes  -no history of diabetes, last A1c well within normal range last year   Lab Results "   Component Value Date    HGBA1C 5.40 01/20/2023        -will begin secondary lab eval as below   -     TSH Rfx On Abnormal To Free T4  -     Vitamin B12 Deficiency Cascade  -     Sedimentation rate, automated  -     C-reactive protein  -     KAYLIE by IFA, Reflex 9-biomarkers profile  -     Protein Elec + Interp, Serum  -     Protein Electrophoresis, Random Urine - Urine, Clean Catch            The following social determinates of health impact the patient's medical decision making: No social determinates of health were factored in to today's visit.     Follow Up  Return in about 6 months (around 4/28/2025) for Recheck.

## 2024-10-31 ENCOUNTER — TELEPHONE (OUTPATIENT)
Dept: INTERNAL MEDICINE | Facility: CLINIC | Age: 74
End: 2024-10-31
Payer: COMMERCIAL

## 2024-10-31 NOTE — TELEPHONE ENCOUNTER
Pt came into office for labs and wanted to let Dr. Hernandez know that he would like to go back to his medication, omeprazole (priLOSEC) 40 MG capsule - Pt states when he went to  new medication that Dr. Hernandez prescribed, dexlansoprazole (DEXILANT) 60 MG capsule , medication was $84 and that's with insurance covering- Pt wants to just go back to his regular medication if possible please.    omeprazole (priLOSEC) 40 MG capsule   Pt would like a 90 day refill       [id number] - [place], [place] - [address] [facility] - [phone]  - [phone] [place] [address] [address] , [facility] [address]  Phone: [phone]  Fax: [phone]

## 2024-11-03 RX ORDER — OMEPRAZOLE 40 MG/1
40 CAPSULE, DELAYED RELEASE ORAL 2 TIMES DAILY
Qty: 180 CAPSULE | Refills: 1 | Status: SHIPPED | OUTPATIENT
Start: 2024-11-03

## 2024-11-04 LAB
ALBUMIN MFR UR ELPH: 35.2 %
ALBUMIN SERPL ELPH-MCNC: 3.9 G/DL (ref 2.9–4.4)
ALBUMIN SERPL-MCNC: 4.2 G/DL (ref 3.5–5.2)
ALBUMIN/GLOB SERPL: 1.3 {RATIO} (ref 0.7–1.7)
ALBUMIN/GLOB SERPL: 1.6 G/DL
ALP SERPL-CCNC: 67 U/L (ref 39–117)
ALPHA1 GLOB MFR UR ELPH: 5.8 %
ALPHA1 GLOB SERPL ELPH-MCNC: 0.2 G/DL (ref 0–0.4)
ALPHA2 GLOB MFR UR ELPH: 21 %
ALPHA2 GLOB SERPL ELPH-MCNC: 0.6 G/DL (ref 0.4–1)
ALT SERPL-CCNC: 22 U/L (ref 1–41)
ANA SER QL IF: NEGATIVE
AST SERPL-CCNC: 26 U/L (ref 1–40)
B-GLOBULIN MFR UR ELPH: 30.1 %
B-GLOBULIN SERPL ELPH-MCNC: 1 G/DL (ref 0.7–1.3)
BASOPHILS # BLD AUTO: 0.01 10*3/MM3 (ref 0–0.2)
BASOPHILS NFR BLD AUTO: 0.2 % (ref 0–1.5)
BILIRUB SERPL-MCNC: 1 MG/DL (ref 0–1.2)
BUN SERPL-MCNC: 9 MG/DL (ref 8–23)
BUN/CREAT SERPL: 10.6 (ref 7–25)
CALCIUM SERPL-MCNC: 9.1 MG/DL (ref 8.6–10.5)
CHLORIDE SERPL-SCNC: 93 MMOL/L (ref 98–107)
CHOLEST SERPL-MCNC: 134 MG/DL (ref 0–200)
CO2 SERPL-SCNC: 29.2 MMOL/L (ref 22–29)
CREAT SERPL-MCNC: 0.85 MG/DL (ref 0.76–1.27)
CRP SERPL-MCNC: <0.3 MG/DL (ref 0–0.5)
EGFRCR SERPLBLD CKD-EPI 2021: 90.6 ML/MIN/1.73
EOSINOPHIL # BLD AUTO: 0.08 10*3/MM3 (ref 0–0.4)
EOSINOPHIL NFR BLD AUTO: 1.5 % (ref 0.3–6.2)
ERYTHROCYTE [DISTWIDTH] IN BLOOD BY AUTOMATED COUNT: 12 % (ref 12.3–15.4)
ERYTHROCYTE [SEDIMENTATION RATE] IN BLOOD BY WESTERGREN METHOD: 2 MM/HR (ref 0–20)
GAMMA GLOB MFR UR ELPH: 7.9 %
GAMMA GLOB SERPL ELPH-MCNC: 1.1 G/DL (ref 0.4–1.8)
GLOBULIN SER CALC-MCNC: 2.6 GM/DL
GLOBULIN SER CALC-MCNC: 2.9 G/DL (ref 2.2–3.9)
GLUCOSE SERPL-MCNC: 84 MG/DL (ref 65–99)
HCT VFR BLD AUTO: 41.4 % (ref 37.5–51)
HDLC SERPL-MCNC: 71 MG/DL (ref 40–60)
HGB BLD-MCNC: 13.6 G/DL (ref 13–17.7)
IMM GRANULOCYTES # BLD AUTO: 0.02 10*3/MM3 (ref 0–0.05)
IMM GRANULOCYTES NFR BLD AUTO: 0.4 % (ref 0–0.5)
INTERPRETATION: NORMAL
LABORATORY COMMENT REPORT: NORMAL
LDLC SERPL CALC-MCNC: 52 MG/DL (ref 0–100)
LYMPHOCYTES # BLD AUTO: 0.95 10*3/MM3 (ref 0.7–3.1)
LYMPHOCYTES NFR BLD AUTO: 17.4 % (ref 19.6–45.3)
M PROTEIN MFR UR ELPH: NORMAL %
M PROTEIN SERPL ELPH-MCNC: NORMAL G/DL
MCH RBC QN AUTO: 31.7 PG (ref 26.6–33)
MCHC RBC AUTO-ENTMCNC: 32.9 G/DL (ref 31.5–35.7)
MCV RBC AUTO: 96.5 FL (ref 79–97)
MONOCYTES # BLD AUTO: 0.54 10*3/MM3 (ref 0.1–0.9)
MONOCYTES NFR BLD AUTO: 9.9 % (ref 5–12)
NEUTROPHILS # BLD AUTO: 3.86 10*3/MM3 (ref 1.7–7)
NEUTROPHILS NFR BLD AUTO: 70.6 % (ref 42.7–76)
NRBC BLD AUTO-RTO: 0 /100 WBC (ref 0–0.2)
PLATELET # BLD AUTO: 208 10*3/MM3 (ref 140–450)
POTASSIUM SERPL-SCNC: 5 MMOL/L (ref 3.5–5.2)
PROT PATTERN SERPL ELPH-IMP: NORMAL
PROT SERPL-MCNC: 6.8 G/DL (ref 6–8.5)
PROT UR-MCNC: <4 MG/DL
RBC # BLD AUTO: 4.29 10*6/MM3 (ref 4.14–5.8)
SODIUM SERPL-SCNC: 132 MMOL/L (ref 136–145)
TRIGL SERPL-MCNC: 46 MG/DL (ref 0–150)
TSH SERPL DL<=0.005 MIU/L-ACNC: 1.73 UIU/ML (ref 0.27–4.2)
VIT B12 SERPL-MCNC: 401 PG/ML (ref 232–1245)
VLDLC SERPL CALC-MCNC: 11 MG/DL (ref 5–40)
WBC # BLD AUTO: 5.46 10*3/MM3 (ref 3.4–10.8)

## 2024-11-09 DIAGNOSIS — I10 BENIGN ESSENTIAL HTN: ICD-10-CM

## 2024-11-11 RX ORDER — METOPROLOL TARTRATE 50 MG
TABLET ORAL
Qty: 270 TABLET | Refills: 3 | Status: SHIPPED | OUTPATIENT
Start: 2024-11-11

## 2024-11-11 RX ORDER — AMLODIPINE BESYLATE 5 MG/1
5 TABLET ORAL DAILY
Qty: 90 TABLET | Refills: 2 | Status: SHIPPED | OUTPATIENT
Start: 2024-11-11

## 2024-12-14 DIAGNOSIS — K86.2 PANCREATIC CYST: Primary | ICD-10-CM

## 2024-12-16 ENCOUNTER — TELEPHONE (OUTPATIENT)
Dept: INTERNAL MEDICINE | Facility: CLINIC | Age: 74
End: 2024-12-16
Payer: COMMERCIAL

## 2024-12-16 NOTE — TELEPHONE ENCOUNTER
----- Message from Bird Hernandez sent at 12/14/2024  1:36 PM EST -----  Let pt know I have placed MRI for 2 year follow up of pancreatic cyst.  ----- Message -----  From: Bird Hernandez DO  Sent: 11/1/2024  12:00 AM EST  To: Bird Hernandez DO    Needs pancreatic cyst followed up

## 2025-01-24 ENCOUNTER — HOSPITAL ENCOUNTER (OUTPATIENT)
Dept: MRI IMAGING | Facility: HOSPITAL | Age: 75
Discharge: HOME | End: 2025-01-24
Payer: MEDICARE

## 2025-01-24 DIAGNOSIS — K86.2 PANCREATIC CYST: ICD-10-CM

## 2025-01-24 PROCEDURE — 25510000002 GADOBENATE DIMEGLUMINE 529 MG/ML SOLUTION: Performed by: STUDENT IN AN ORGANIZED HEALTH CARE EDUCATION/TRAINING PROGRAM

## 2025-01-24 PROCEDURE — A9577 INJ MULTIHANCE: HCPCS | Performed by: STUDENT IN AN ORGANIZED HEALTH CARE EDUCATION/TRAINING PROGRAM

## 2025-01-24 PROCEDURE — 74183 MRI ABD W/O CNTR FLWD CNTR: CPT

## 2025-01-24 RX ADMIN — GADOBENATE DIMEGLUMINE 15 ML: 529 INJECTION, SOLUTION INTRAVENOUS at 09:00

## 2025-02-09 DIAGNOSIS — I25.10 CORONARY ARTERY CALCIFICATION SEEN ON CT SCAN: ICD-10-CM

## 2025-02-10 RX ORDER — ATORVASTATIN CALCIUM 20 MG/1
20 TABLET, FILM COATED ORAL DAILY
Qty: 90 TABLET | Refills: 1 | Status: SHIPPED | OUTPATIENT
Start: 2025-02-10

## 2025-02-10 RX ORDER — FUROSEMIDE 20 MG/1
20 TABLET ORAL DAILY
Qty: 90 TABLET | Refills: 1 | Status: SHIPPED | OUTPATIENT
Start: 2025-02-10

## 2025-03-09 RX ORDER — SODIUM CHLORIDE 1 G/1
TABLET ORAL
Qty: 180 TABLET | Refills: 2 | Status: SHIPPED | OUTPATIENT
Start: 2025-03-09

## 2025-05-06 RX ORDER — OMEPRAZOLE 40 MG/1
40 CAPSULE, DELAYED RELEASE ORAL 2 TIMES DAILY
Qty: 180 CAPSULE | Refills: 1 | Status: SHIPPED | OUTPATIENT
Start: 2025-05-06

## 2025-05-07 ENCOUNTER — OFFICE VISIT (OUTPATIENT)
Dept: INTERNAL MEDICINE | Facility: CLINIC | Age: 75
End: 2025-05-07
Payer: MEDICARE

## 2025-05-07 VITALS
HEIGHT: 66 IN | DIASTOLIC BLOOD PRESSURE: 76 MMHG | SYSTOLIC BLOOD PRESSURE: 120 MMHG | TEMPERATURE: 97.5 F | HEART RATE: 72 BPM | BODY MASS INDEX: 25.75 KG/M2 | OXYGEN SATURATION: 95 % | WEIGHT: 160.2 LBS

## 2025-05-07 DIAGNOSIS — I10 PRIMARY HYPERTENSION: ICD-10-CM

## 2025-05-07 DIAGNOSIS — E87.1 CHRONIC HYPONATREMIA: Primary | ICD-10-CM

## 2025-05-07 DIAGNOSIS — Z86.39 HISTORY OF SIADH: ICD-10-CM

## 2025-05-07 LAB
BUN SERPL-MCNC: 8 MG/DL (ref 8–23)
BUN/CREAT SERPL: 10.5 (ref 7–25)
CALCIUM SERPL-MCNC: 9.4 MG/DL (ref 8.6–10.5)
CHLORIDE SERPL-SCNC: 91 MMOL/L (ref 98–107)
CO2 SERPL-SCNC: 31 MMOL/L (ref 22–29)
CREAT SERPL-MCNC: 0.76 MG/DL (ref 0.76–1.27)
EGFRCR SERPLBLD CKD-EPI 2021: 93.7 ML/MIN/1.73
GLUCOSE SERPL-MCNC: 84 MG/DL (ref 65–99)
POTASSIUM SERPL-SCNC: 4.9 MMOL/L (ref 3.5–5.2)
SODIUM SERPL-SCNC: 135 MMOL/L (ref 136–145)

## 2025-05-07 NOTE — PROGRESS NOTES
"  Bird Hernandez DO, [facility]  Internal Medicine  [facility]  [address], [address]  [place] [address]  [phone]    Chief Complaint  Hypertension    SUBJECTIVE    History of Present Illness    Yeyo Abebe is a 74 y.o. male who presents to the office today as an established patient that last saw me on 10/28/2024.     history of SIADH per chart review with chronic hyponatremia in the upper 120s/low 130s . Followed with  but now released from nephrology. Takes sodium chloride tablet 1 g twice daily . States he \"feels fine\".     HTN: taking amlodipine 5 mg daily, metoprolol tartrate 75 mg twice daily , furosemide 20 mg daily . Doesn't check BP at home.     Allergies   Allergen Reactions    Aspirin-Caffeine Other (See Comments)     SWEATING      Codeine Anxiety     ALSO SWEATING AND ELEVATED BODY TEMPERATURE        Outpatient Medications Marked as Taking for the 5/7/25 encounter (Office Visit) with Bird Hernandez DO   Medication Sig Dispense Refill    amLODIPine (NORVASC) 5 MG tablet TAKE 1 TABLET BY MOUTH DAILY 90 tablet 2    aspirin 81 MG EC tablet Take 1 tablet by mouth Daily.      atorvastatin (LIPITOR) 20 MG tablet TAKE 1 TABLET BY MOUTH DAILY 90 tablet 1    Calcium Carbonate-Vitamin D (calcium-vitamin D) 500-200 MG-UNIT tablet per tablet Take 1 tablet by mouth 2 (Two) Times a Day With Meals.      furosemide (LASIX) 20 MG tablet TAKE 1 TABLET BY MOUTH DAILY 90 tablet 1    Loratadine 10 MG capsule Take 1 capsule by mouth Daily.      metoprolol tartrate (LOPRESSOR) 50 MG tablet TAKE 1 AND 1/2 TABLETS BY MOUTH TWICE DAILY 270 tablet 3    omeprazole (priLOSEC) 40 MG capsule TAKE 1 CAPSULE BY MOUTH TWICE DAILY 180 capsule 1    sodium chloride 1 g tablet TAKE 1 TABLET(1 GRAM) BY MOUTH IN THE MORNING AND IN THE EVENING 180 tablet 2    tamsulosin (FLOMAX) 0.4 MG capsule 24 hr capsule Take 1 capsule by mouth Daily.  1        Past Medical History:   Diagnosis Date    Allergies     BPH (benign " "prostatic hyperplasia)     Cholelithiasis 12/2023    Gallbladder removed    GERD (gastroesophageal reflux disease)     Hypertension     Hyponatremia     Liver cyst     Pancreatic cyst     needs followed up in 2027    Personal history of kidney stones     Polio     1950s    Prostate CA     Scoliosis     SIADH (syndrome of inappropriate ADH production)        OBJECTIVE    Vital Signs:   /76   Pulse 72   Temp 97.5 °F (36.4 °C) (Infrared)   Ht 167.6 cm (66\")   Wt 72.7 kg (160 lb 3.2 oz)   SpO2 95%   BMI 25.86 kg/m²        Physical Exam  Vitals reviewed.   Constitutional:       General: He is not in acute distress.     Appearance: Normal appearance. He is not ill-appearing.   Eyes:      General: No scleral icterus.  Cardiovascular:      Rate and Rhythm: Normal rate and regular rhythm.      Heart sounds: Normal heart sounds. No murmur heard.  Pulmonary:      Effort: Pulmonary effort is normal. No respiratory distress.      Breath sounds: Normal breath sounds. No wheezing.   Musculoskeletal:      Right lower leg: No edema.      Left lower leg: No edema.   Skin:     Coloration: Skin is not jaundiced.   Neurological:      Mental Status: He is alert.   Psychiatric:         Mood and Affect: Mood normal.         Behavior: Behavior normal.         Thought Content: Thought content normal.                             ASSESSMENT & PLAN     Diagnoses and all orders for this visit:    1. Chronic hyponatremia (Primary)  2. History of SIADH  -history of SIADH per chart review with chronic hyponatremia in the upper 120s/low 130s . Followed with  but now released from nephrology. Takes sodium chloride tablet 1 g twice daily . States he \"feels fine\".  Lab Results   Component Value Date    GLUCOSE 84 10/31/2024    CALCIUM 9.1 10/31/2024     (L) 10/31/2024    K 5.0 10/31/2024    CO2 29.2 (H) 10/31/2024    CL 93 (L) 10/31/2024    BUN 9 10/31/2024    CREATININE 0.85 10/31/2024    EGFR 90.6 10/31/2024    BCR 10.6 10/31/2024 "    ANIONGAP 10.2 12/09/2022     -last BMP as above with sodium at goal of 130 or higher.. Recheck today for q6 month monitoring. Further plan depending on results.   -     Basic Metabolic Panel    3. Primary hypertension  -taking amlodipine 5 mg daily, metoprolol tartrate 75 mg twice daily , furosemide 20 mg daily . Doesn't check BP at home.   -BP well controlled 120/76 in office today, continue current regimen. Check renal function and electrolytes.             Follow Up  Return for Next scheduled follow up.    Patient/family had no further questions at this time and verbalized understanding of the plan discussed today.

## 2025-05-07 NOTE — ASSESSMENT & PLAN NOTE
taking amlodipine 5 mg daily, metoprolol tartrate 75 mg twice daily , furosemide 20 mg daily . Doesn't check BP at home.

## 2025-07-11 ENCOUNTER — TELEPHONE (OUTPATIENT)
Dept: INTERNAL MEDICINE | Facility: CLINIC | Age: 75
End: 2025-07-11
Payer: MEDICARE

## 2025-07-11 NOTE — TELEPHONE ENCOUNTER
Caller: Yeyo Abebe    Relationship: Self    Best call back number: [phone]     What is the medical concern/diagnosis: BASEL CELL ON NOSE    What specialty or service is being requested: HEMATOLOGY ONCOLOGY    What is the provider, practice or medical service name: [facility]    What is the office location: [facility]     What is the office phone number: FAX:[phone]    Any additional details: PLEASE ADVISE

## 2025-07-17 NOTE — TELEPHONE ENCOUNTER
Normally for basal cell skin cancer he would see a dermatology, not an oncologist.. unless it has spread to other parts of the body, which is not common for basal cell. Has he seen a dermatologist? Have they recommended he see an oncologist?

## 2025-07-18 NOTE — TELEPHONE ENCOUNTER
He needs to see dermatologist first. Can you give him the contact info for [facility]?

## 2025-07-23 NOTE — TELEPHONE ENCOUNTER
Patient called inquiring about Dermatology information. Informed him that Dr. Hernandez recommended to call [facility] at [phone]. If they do not accept his insurance to call the number on the back of the card to get a list of Dermatologist that are in network and then he can call to schedule an appointment.     Thanks,    Dolly

## 2025-07-28 DIAGNOSIS — I10 BENIGN ESSENTIAL HTN: ICD-10-CM

## 2025-07-28 RX ORDER — AMLODIPINE BESYLATE 5 MG/1
5 TABLET ORAL DAILY
Qty: 90 TABLET | Refills: 2 | Status: SHIPPED | OUTPATIENT
Start: 2025-07-28

## 2025-08-05 RX ORDER — FUROSEMIDE 20 MG/1
20 TABLET ORAL DAILY
Qty: 90 TABLET | Refills: 1 | Status: SHIPPED | OUTPATIENT
Start: 2025-08-05

## 2025-08-08 DIAGNOSIS — I25.10 CORONARY ARTERY CALCIFICATION SEEN ON CT SCAN: ICD-10-CM

## 2025-08-08 RX ORDER — ATORVASTATIN CALCIUM 20 MG/1
20 TABLET, FILM COATED ORAL DAILY
Qty: 90 TABLET | Refills: 1 | Status: SHIPPED | OUTPATIENT
Start: 2025-08-08

## (undated) DEVICE — SUT VIC 0 TIES 18IN J912G

## (undated) DEVICE — APPL CHLORAPREP HI/LITE 26ML ORNG

## (undated) DEVICE — RETRIEVAL BALLOON CATHETER: Brand: EXTRACTOR™ PRO RX

## (undated) DEVICE — BITEBLOCK OMNI BLOC

## (undated) DEVICE — GLV SURG BIOGEL LTX PF 7 1/2

## (undated) DEVICE — THE TORRENT IRRIGATION SCOPE CONNECTOR IS USED WITH THE TORRENT IRRIGATION TUBING TO PROVIDE IRRIGATION FLUIDS SUCH AS STERILE WATER DURING GASTROINTESTINAL ENDOSCOPIC PROCEDURES WHEN USED IN CONJUNCTION WITH AN IRRIGATION PUMP (OR ELECTROSURGICAL UNIT).: Brand: TORRENT

## (undated) DEVICE — SENSR O2 OXIMAX FNGR A/ 18IN NONSTR

## (undated) DEVICE — STPLR SKIN VISISTAT WD 35CT

## (undated) DEVICE — SPHINCTEROTOME: Brand: HYDRATOME RX 44

## (undated) DEVICE — FRCP BX RADJAW4 NDL 2.8 240CM LG OG BX40

## (undated) DEVICE — LOU LAP CHOLE: Brand: MEDLINE INDUSTRIES, INC.

## (undated) DEVICE — ERBE NESSY®PLATE 170 SPLIT; 168CM²; CABLE 3M: Brand: ERBE

## (undated) DEVICE — CANN NASL CO2 TRULINK W/O2 A/

## (undated) DEVICE — THE DISPOSABLE RAPTOR GRASPING DEVICE IS USED TO GRASP TISSUE AND/OR RETRIEVE FOREIGN BODIES, EXCISED TISSUE AND STENTS DURING ENDOSCOPIC PROCEDURES.: Brand: RAPTOR

## (undated) DEVICE — TUBING, SUCTION, 1/4" X 10', STRAIGHT: Brand: MEDLINE

## (undated) DEVICE — DEV LK WIREGUIDE FUSN OLYMP SCP

## (undated) DEVICE — TB ET HI LO CUFF MURPHY 7MM

## (undated) DEVICE — RX DELIVERY SYSTEM: Brand: NAVIFLEX™ RX DELIVERY SYSTEM

## (undated) DEVICE — EXTENSION SET, MALE LUER LOCK ADAPTER WITH RETRACTABLE COLLAR

## (undated) DEVICE — ENDOPATH XCEL BLADELESS TROCARS WITH STABILITY SLEEVES: Brand: ENDOPATH XCEL

## (undated) DEVICE — ADAPT CLN BIOGUARD AIR/H2O DISP

## (undated) DEVICE — KT ORCA ORCAPOD DISP STRL

## (undated) DEVICE — DISPOSABLE MONOPOLAR ENDOSCOPIC CORD 10 FT. (3M): Brand: KIRWAN

## (undated) DEVICE — APPL HEMO SURG ARISTA/AH/FLEXITIP XL 38CM

## (undated) DEVICE — LAPAROVUE VISIBILITY SYSTEM LAPAROSCOPIC SOLUTIONS: Brand: LAPAROVUE

## (undated) DEVICE — KT CLN CLEANOR SCPE

## (undated) DEVICE — SOL NACL 0.9PCT 1000ML

## (undated) DEVICE — DRAPE,REIN 53X77,STERILE: Brand: MEDLINE

## (undated) DEVICE — CANN O2 ETCO2 FITS ALL CONN CO2 SMPL A/ 7IN DISP LF

## (undated) DEVICE — TROCAR SITE CLOSURE DEVICE: Brand: ENDO CLOSE

## (undated) DEVICE — ENDOPATH XCEL UNIVERSAL TROCAR STABLILITY SLEEVES: Brand: ENDOPATH XCEL

## (undated) DEVICE — CATH CHOLANG 4.5F18IN BRGNDY

## (undated) DEVICE — LN SMPL CO2 SHTRM SD STREAM W/M LUER

## (undated) DEVICE — DRP C/ARM 41X74IN

## (undated) DEVICE — STPCK 3WY D201 DISCOFIX

## (undated) DEVICE — PAD GRND REM POLYHESIVE A/ DISP

## (undated) DEVICE — CATH IV INSYTE AUTOGARD 14G 1 1/2IN ORNG

## (undated) DEVICE — KT VLV BIOGUARD SXN BIOP AIR/H20 CONN 4PC DISP

## (undated) DEVICE — MSK PROC CURAPLEX O2 2/ADAPT 7FT

## (undated) DEVICE — ENDOPOUCH RETRIEVER SPECIMEN RETRIEVAL BAGS: Brand: ENDOPOUCH RETRIEVER

## (undated) DEVICE — SUT MNCRYL PLS ANTIB UD 4/0 PS2 18IN

## (undated) DEVICE — ADHS SKIN SURG TISS VISC PREMIERPRO EXOFIN HI/VISC FAST/DRY

## (undated) DEVICE — TB ET HI LO CUFF MURPHY 7.5MM

## (undated) DEVICE — ENDOCUT SCISSOR TIP, DISPOSABLE: Brand: RENEW